# Patient Record
Sex: FEMALE | Race: WHITE | NOT HISPANIC OR LATINO | Employment: FULL TIME | URBAN - METROPOLITAN AREA
[De-identification: names, ages, dates, MRNs, and addresses within clinical notes are randomized per-mention and may not be internally consistent; named-entity substitution may affect disease eponyms.]

---

## 2017-08-06 ENCOUNTER — OFFICE VISIT (OUTPATIENT)
Dept: URGENT CARE | Facility: CLINIC | Age: 67
End: 2017-08-06
Payer: COMMERCIAL

## 2017-08-06 VITALS
OXYGEN SATURATION: 98 % | HEART RATE: 72 BPM | TEMPERATURE: 98 F | HEIGHT: 66 IN | BODY MASS INDEX: 29.73 KG/M2 | WEIGHT: 185 LBS | DIASTOLIC BLOOD PRESSURE: 81 MMHG | SYSTOLIC BLOOD PRESSURE: 137 MMHG | RESPIRATION RATE: 18 BRPM

## 2017-08-06 DIAGNOSIS — R51.9 FACIAL PAIN, ACUTE: Primary | ICD-10-CM

## 2017-08-06 PROCEDURE — 99203 OFFICE O/P NEW LOW 30 MIN: CPT | Mod: S$GLB,,, | Performed by: EMERGENCY MEDICINE

## 2017-08-06 PROCEDURE — 3008F BODY MASS INDEX DOCD: CPT | Mod: S$GLB,,, | Performed by: EMERGENCY MEDICINE

## 2017-08-06 RX ORDER — CLINDAMYCIN HYDROCHLORIDE 300 MG/1
300 CAPSULE ORAL 4 TIMES DAILY
Qty: 40 CAPSULE | Refills: 0 | Status: SHIPPED | OUTPATIENT
Start: 2017-08-06 | End: 2017-08-16

## 2017-08-06 NOTE — PATIENT INSTRUCTIONS
"  Dental Pain    A crack or cavity in a tooth can cause tooth pain. This is because the crack or cavity exposes the sensitive inner area of the tooth. An infection in the gum or the root of the tooth can cause pain and swelling. The pain is often made worse when you drink hot or cold beverages. It can also be worse when you bite on hard foods. Pain may spread from the tooth to your ear or the area of the jaw on the same side.  Home care  Follow these tips when caring for yourself at home:  · Avoid hot and cold foods and drinks. Your tooth may be sensitive to changes in temperature.  · Use toothpaste made for sensitive teeth. Brush gently up and down instead of sideways. Brushing sideways can wear away root surfaces if they are exposed.  · If your tooth is chipped or cracked, or if there is a large open cavity, put oil of cloves directly on the tooth to relieve pain. You can buy oil of cloves at drugstores. Some pharmacies carry an over-the-counter "toothache kit." This contains a paste that you can put on the exposed tooth to make it less sensitive.  · Put a cold pack on your jaw over the sore area to help reduce pain.  · You may use over-the-counter medicine to ease pain, unless your doctor prescribed another medicine. If you have chronic liver or kidney disease, talk with your healthcare provider before using acetaminophen or ibuprofen. Also talk with your provider if youve had a stomach ulcer or GI bleeding.  · If you have signs of an infection, you will be given an antibiotic. Take it as directed.  Follow-up care  Follow up with your dentist, or as advised. Your pain may go away with the treatment given today. But only a dentist can fully look at and treat the cause of your pain. This will keep the pain from coming back.  Call 911  Call 911 if any of these occur:  · Unusual drowsiness  · Headache or stiff neck  · Weakness or fainting  · Difficulty swallowing or breathing  When to seek medical advice  Call your " health care provider right away if any of these occur:  · Your face becomes swollen or red  · Pain gets worse or spreads to your neck  · Fever of 100.4º F (38.0º C) or higher, or as directed by your healthcare provider  · Pus drains from the tooth  Date Last Reviewed: 10/1/2016  © 0324-8695 [employer]. [address] [place] [address]. All rights reserved. This information is not intended as a substitute for professional medical care. Always follow your healthcare professional's instructions.      If your condition worsens or fails to improve we recommend that you receive another evaluation at the ER immediately or contact your PCP to discuss your concerns or return here. You must understand that you've received an urgent care treatment only and that you may be released before all your medical problems are known or treated. You the patient will arrange for followup care as instructed.   Follow up with your primary physician as needed  Tylenol or ibuprofen for pain or fever as needed. If you have any allergies to these meds or  contraindication to tylenol or ibuprofen like kidney disease, stomach ulcers or blood thinner etc then don't take the tylenol or ibuprofen.  If you do take the  Narcotic pain meds be sure to take precautions as it can make you sleepy or drowsy  If you had xrays done we will call you with radiologist report  If you had labs done we will call you with the results  If you get sicker in the meantime or new symptoms either return here or see your primary care doctor  If you do take the  Narcotic pain meds be sure to take precautions as it can make you sleepy or drowsy  If you had xrays done we will call you with radiologist report  If you had labs done we will call you with the results  If you get sicker in the meantime or new symptoms either return here or see your primary care doctor

## 2017-08-06 NOTE — PROGRESS NOTES
"Subjective:       Patient ID: Marjorie Harkins is a 66 y.o. female.    Vitals:  height is 5' 6" (1.676 m) and weight is 83.9 kg (185 lb). Her temperature is 97.6 °F (36.4 °C). Her blood pressure is 137/81 and her pulse is 72. Her respiration is 18 and oxygen saturation is 98%.     Chief Complaint: Oral Pain    Pt states she has been having the oral pain for about two weeks. She wasn't sure initially if it was a tooth or not. However since yesterday it seems clear that it is a tooth and she has had multiple root canals and fillings and caps. No fever. She plans on making appt with dentist tomorrow. She is not a diabetic. She says she only wants antibiotics and advil is sufficient for her pain      Oral Pain    This is a recurrent problem. The current episode started 1 to 4 weeks ago. The problem occurs constantly. The problem has been unchanged. The pain is at a severity of 5/10. The pain is mild. Associated symptoms include facial pain. Pertinent negatives include no fever or oral bleeding. She has tried NSAIDs for the symptoms. The treatment provided no relief.     Review of Systems   Constitution: Negative for chills and fever.   HENT: Negative for headaches and sore throat.    Eyes: Negative for blurred vision.   Cardiovascular: Negative for chest pain.   Respiratory: Negative for shortness of breath.    Skin: Negative for rash.   Musculoskeletal: Negative for back pain and joint pain.   Gastrointestinal: Negative for abdominal pain, diarrhea, nausea and vomiting.   Psychiatric/Behavioral: The patient is not nervous/anxious.        Objective:      Physical Exam   Constitutional: She is oriented to person, place, and time. She appears well-developed and well-nourished. She is cooperative.  Non-toxic appearance. She does not appear ill. No distress (mild).   HENT:   Head: Normocephalic and atraumatic.   Right Ear: Hearing, tympanic membrane, external ear and ear canal normal.   Left Ear: Hearing, tympanic membrane, " external ear and ear canal normal.   Nose: Nose normal. No mucosal edema, rhinorrhea or nasal deformity. No epistaxis. Right sinus exhibits no maxillary sinus tenderness and no frontal sinus tenderness. Left sinus exhibits no maxillary sinus tenderness and no frontal sinus tenderness.   Mouth/Throat: Uvula is midline, oropharynx is clear and moist and mucous membranes are normal. No trismus in the jaw. Abnormal dentition. No uvula swelling or dental caries. No posterior oropharyngeal erythema. Tonsils are 0 on the right. Tonsils are 0 on the left.       Eyes: Conjunctivae, EOM and lids are normal. Pupils are equal, round, and reactive to light. Right eye exhibits no discharge. Left eye exhibits no discharge. No scleral icterus.   Sclera clear bilat   Neck: Trachea normal, normal range of motion, full passive range of motion without pain and phonation normal. Neck supple.   Cardiovascular: Normal rate, regular rhythm, normal heart sounds and normal pulses.    Pulmonary/Chest: Effort normal and breath sounds normal. No respiratory distress.   Abdominal: Soft. Normal appearance and bowel sounds are normal. She exhibits no distension, no pulsatile midline mass and no mass. There is no tenderness.   Musculoskeletal: Normal range of motion. She exhibits no edema or deformity.   Neurological: She is alert and oriented to person, place, and time. She exhibits normal muscle tone. Coordination normal.   Skin: Skin is warm, dry and intact. She is not diaphoretic. No pallor.   Psychiatric: She has a normal mood and affect. Her speech is normal and behavior is normal. Judgment and thought content normal. Cognition and memory are normal.   Nursing note and vitals reviewed.      Assessment:       1. Facial pain, acute        Plan:         Facial pain, acute    Other orders  -     clindamycin (CLEOCIN) 300 MG capsule; Take 1 capsule (300 mg total) by mouth 4 (four) times daily.  Dispense: 40 capsule; Refill: 0

## 2017-11-26 ENCOUNTER — OFFICE VISIT (OUTPATIENT)
Dept: URGENT CARE | Facility: CLINIC | Age: 67
End: 2017-11-26
Payer: COMMERCIAL

## 2017-11-26 VITALS
DIASTOLIC BLOOD PRESSURE: 92 MMHG | OXYGEN SATURATION: 97 % | WEIGHT: 185 LBS | SYSTOLIC BLOOD PRESSURE: 150 MMHG | HEIGHT: 66 IN | RESPIRATION RATE: 12 BRPM | HEART RATE: 99 BPM | TEMPERATURE: 99 F | BODY MASS INDEX: 29.73 KG/M2

## 2017-11-26 DIAGNOSIS — I10 HYPERTENSION, UNSPECIFIED TYPE: Primary | ICD-10-CM

## 2017-11-26 DIAGNOSIS — M25.561 KNEE PAIN, RIGHT ANTERIOR: ICD-10-CM

## 2017-11-26 PROCEDURE — 99214 OFFICE O/P EST MOD 30 MIN: CPT | Mod: S$GLB,,, | Performed by: EMERGENCY MEDICINE

## 2017-11-26 RX ORDER — LOSARTAN POTASSIUM 50 MG/1
50 TABLET ORAL
Status: ON HOLD | COMMUNITY
Start: 2017-03-21 | End: 2024-02-15

## 2017-11-26 NOTE — PROGRESS NOTES
"Subjective:       Patient ID: Marjorie Harkins is a 66 y.o. female.    Vitals:  height is 5' 6" (1.676 m) and weight is 83.9 kg (185 lb). Her temperature is 98.5 °F (36.9 °C). Her blood pressure is 150/92 (abnormal) and her pulse is 99. Her respiration is 12 and oxygen saturation is 97%.     Chief Complaint: Joint Swelling    Pt fell on her right knee about one month ago. It is still swollen and she decided to get it checked today.She needs an orthopedist here as well as PCP. Her doctors had been in [place]. She forgot to take her BP meds for the past few days.      Pain   This is a new problem. The current episode started 1 to 4 weeks ago. The problem occurs constantly. The problem has been gradually improving. Associated symptoms include joint swelling and numbness. Pertinent negatives include no abdominal pain, chest pain, chills, fever, headaches, nausea, rash, sore throat or vomiting. The symptoms are aggravated by bending. She has tried nothing for the symptoms. The treatment provided no relief.   Knee Injury   This is a new problem. The current episode started 1 to 4 weeks ago. The problem occurs constantly. The problem has been gradually improving. Associated symptoms include joint swelling and numbness. Pertinent negatives include no abdominal pain, chest pain, chills, fever, headaches, nausea, rash, sore throat or vomiting. Associated symptoms comments: Pt has a hx of DVT and some discoloration in the right ankle. The symptoms are aggravated by bending. She has tried nothing for the symptoms. The treatment provided mild relief.     Review of Systems   Constitution: Negative for chills and fever.   HENT: Negative for sore throat.    Eyes: Negative for blurred vision.   Cardiovascular: Negative for chest pain.   Respiratory: Negative for shortness of breath.    Skin: Negative for rash.   Musculoskeletal: Positive for joint pain and joint swelling. Negative for back pain.   Gastrointestinal: Negative for " abdominal pain, diarrhea, nausea and vomiting.   Neurological: Positive for numbness. Negative for headaches.   Psychiatric/Behavioral: The patient is not nervous/anxious.    All other systems reviewed and are negative.      Objective:      Physical Exam   Constitutional: She is oriented to person, place, and time. She appears well-developed and well-nourished. She is cooperative.  Non-toxic appearance. She does not appear ill. No distress.   HENT:   Head: Normocephalic and atraumatic.   Right Ear: Hearing, tympanic membrane, external ear and ear canal normal.   Left Ear: Hearing, tympanic membrane, external ear and ear canal normal.   Nose: Nose normal. No mucosal edema, rhinorrhea or nasal deformity. No epistaxis. Right sinus exhibits no maxillary sinus tenderness and no frontal sinus tenderness. Left sinus exhibits no maxillary sinus tenderness and no frontal sinus tenderness.   Mouth/Throat: Uvula is midline, oropharynx is clear and moist and mucous membranes are normal. No trismus in the jaw. Normal dentition. No uvula swelling. No posterior oropharyngeal erythema.   Eyes: Conjunctivae, EOM and lids are normal. Pupils are equal, round, and reactive to light. Right eye exhibits no discharge. Left eye exhibits no discharge. No scleral icterus.   Sclera clear bilat   Neck: Trachea normal, normal range of motion, full passive range of motion without pain and phonation normal. Neck supple.   Cardiovascular: Normal rate, regular rhythm, normal heart sounds, intact distal pulses and normal pulses.    Pulmonary/Chest: Effort normal and breath sounds normal. No respiratory distress.   Abdominal: Soft. Normal appearance and bowel sounds are normal. She exhibits no distension, no pulsatile midline mass and no mass. There is no tenderness.   Musculoskeletal: Normal range of motion. She exhibits tenderness (tender slight swelling to inferior aspect of patella right knee. No patella tenderness. Good extension No ligament  laxity. No effusion). She exhibits no edema or deformity.   Neurological: She is alert and oriented to person, place, and time. She exhibits normal muscle tone. Coordination normal.   Skin: Skin is warm, dry and intact. She is not diaphoretic. No pallor.   Psychiatric: She has a normal mood and affect. Her speech is normal and behavior is normal. Judgment and thought content normal. Cognition and memory are normal.   Nursing note and vitals reviewed.    xray neg for fracture and radiologist confirm  Assessment:       1. Hypertension, unspecified type    2. Knee pain, right anterior        Plan:         Hypertension, unspecified type  -     Ambulatory referral to Internal Medicine    Knee pain, right anterior  -     X-Ray Knee 3 View Right; Future; Expected date: 11/26/2017  -     Ambulatory Referral to Sports Medicine

## 2017-11-26 NOTE — PATIENT INSTRUCTIONS
Lower Extremity Contusion  You have a contusion (bruise) of a lower extremity (leg, knee, ankle, foot, or toe). Symptoms include pain, swelling, and skin discoloration. No bones are broken. This injury may take from a few days to a few weeks to heal.  During that time, the bruise may change from reddish in color, to purple-blue, to green-yellow, to yellow-brown.  Home care  · Unless another medicine was prescribed, you can take acetaminophen, ibuprofen, or naproxen to control pain. (If you have chronic liver or kidney disease or ever had a stomach ulcer or gastrointestinal bleeding, talk with your doctor before using these medicines.)  · Elevate the injured area to reduce pain and swelling. As much as possible, sit or lie down with the injured area raised about the level of your heart. This is especially important during the first 48 hours.  · Ice the injured area to help reduce pain and swelling. Wrap a cold source (ice pack or ice cubes in a plastic bag) in a thin towel. Apply to the bruised area for 20 minutes every 1 to 2 hours the first day. Continue this 3 to 4 times a day until the pain and swelling goes away.  · If crutches have been advised, do not bear full weight on the injured leg until you can do so without pain. You may return to sports when you are able to put full weight and impact on the injured leg without pain.  Follow up  Follow up with your healthcare provider or our staff as advised. Call if you are not improving within the next 1 to 2 weeks.  When to seek medical advice   Call your healthcare provider right away if any of these occur:  · Increased pain or swelling  · Foot or toes become cold, blue, numb or tingly  · Signs of infection: Warmth, drainage, or increased redness or pain around the injury  · Inability to move the injured area   · Frequent bruising for unknown reasons  Date Last Reviewed: 2/1/2017  © 2039-4972 [employer]. [address] [place] [address], [place] [address].  All rights reserved. This information is not intended as a substitute for professional medical care. Always follow your healthcare professional's instructions.

## 2019-02-19 ENCOUNTER — OFFICE VISIT (OUTPATIENT)
Dept: URGENT CARE | Facility: CLINIC | Age: 69
End: 2019-02-19
Payer: COMMERCIAL

## 2019-02-19 VITALS
TEMPERATURE: 101 F | SYSTOLIC BLOOD PRESSURE: 110 MMHG | HEIGHT: 66 IN | BODY MASS INDEX: 29.73 KG/M2 | HEART RATE: 69 BPM | OXYGEN SATURATION: 96 % | DIASTOLIC BLOOD PRESSURE: 78 MMHG | WEIGHT: 185 LBS | RESPIRATION RATE: 20 BRPM

## 2019-02-19 DIAGNOSIS — J10.1 INFLUENZA A: Primary | ICD-10-CM

## 2019-02-19 LAB
CTP QC/QA: YES
FLUAV AG NPH QL: POSITIVE
FLUBV AG NPH QL: NEGATIVE

## 2019-02-19 PROCEDURE — 87804 POCT INFLUENZA A/B: ICD-10-PCS | Mod: QW,S$GLB,, | Performed by: FAMILY MEDICINE

## 2019-02-19 PROCEDURE — 99214 OFFICE O/P EST MOD 30 MIN: CPT | Mod: S$GLB,,, | Performed by: FAMILY MEDICINE

## 2019-02-19 PROCEDURE — 87804 INFLUENZA ASSAY W/OPTIC: CPT | Mod: QW,S$GLB,, | Performed by: FAMILY MEDICINE

## 2019-02-19 PROCEDURE — 99214 PR OFFICE/OUTPT VISIT, EST, LEVL IV, 30-39 MIN: ICD-10-PCS | Mod: S$GLB,,, | Performed by: FAMILY MEDICINE

## 2019-02-19 RX ORDER — OSELTAMIVIR PHOSPHATE 75 MG/1
75 CAPSULE ORAL 2 TIMES DAILY
Qty: 10 CAPSULE | Refills: 0 | Status: SHIPPED | OUTPATIENT
Start: 2019-02-19 | End: 2020-12-07

## 2019-02-19 RX ORDER — PROMETHAZINE HYDROCHLORIDE AND DEXTROMETHORPHAN HYDROBROMIDE 6.25; 15 MG/5ML; MG/5ML
5 SYRUP ORAL EVERY 4 HOURS PRN
Qty: 118 ML | Refills: 0 | Status: SHIPPED | OUTPATIENT
Start: 2019-02-19 | End: 2020-12-07

## 2019-02-19 NOTE — PATIENT INSTRUCTIONS
PLEASE READ YOUR DISCHARGE INSTRUCTIONS ENTIRELY AS IT CONTAINS IMPORTANT INFORMATION.      You have been diagnosed with Influenza.     You are contagious for 24 hours after you start the Tamilfu or 24 hours after your last fever, whichever happens last.    Please drink plenty of fluids.    Please get plenty of rest.    Please return here or go to the Emergency Department for any concerns or worsening of condition.    Tamiflu prescription has been discussed and if prescribed, please take to completion unless you cannot tolerate the side effects.       Take tylenol (acetominophen) for fever, chills or body aches every 4 hours. do not exceed 4000 mg/ day.    Take Motrin (Ibuprofen) every 4 hours for fever, chills, pain or inflammation.    Use an antihistmine such as claritin or zyrtec to dry you out. Use mucinex (guaifenisin) to break up mucous    Use over the counter flonase: one spray each nostril twice daily OR two sprays each nostril once daily.   If you find this dries your nose out or your nose bleeds, try using over the counter nasal saline a few minutes prior to using the flonase to moisten the lining of your nose.     Do not drive while taking the cough syrup - best to take it at night before going to sleep. However, you can take it during the day (every 4-6 hours) if you do not have to drive or operate machinery. This medication will make you drowsy. Try taking half a dose first to see how it affects you.     Please return or see your primary care doctor if you develop new or worsening symptoms.     Please arrange follow up with your primary medical clinic as soon as possible. You must understand that you've received an Urgent Care treatment only and that you may be released before all of your medical problems are known or treated. You, the patient, will arrange for follow up as instructed. If your symptoms worsen or fail to improve you should go to the Emergency Room.  WE CANNOT RULE OUT ALL POSSIBLE CAUSES  OF YOUR SYMPTOMS IN THE URGENT CARE SETTING PLEASE GO TO THE ER IF YOU FEELS YOUR CONDITION IS WORSENING OR YOU WOULD LIKE EMERGENT EVALUATION.      The Flu (Influenza)     The virus that causes the flu spreads through the air in droplets when someone who has the flu coughs, sneezes, laughs, or talks.   The flu (influenza) is an infection that affects your respiratory tract. This tract is made up of your mouth, nose, and lungs, and the passages between them. Unlike a cold, the flu can make you very ill. And it can lead to pneumonia, a serious lung infection. The flu can have serious complications and even cause death.  Who is at risk for the flu?  Anyone can get the flu. But you are more likely to become infected if you:  · Have a weakened immune system  · Work in a healthcare setting where you may be exposed to flu germs  · Live or work with someone who has the flu  · Havent had an annual flu shot  How does the flu spread?  The flu is caused by a virus. The virus spreads through the air in droplets when someone who has the flu coughs, sneezes, laughs, or talks. You can become infected when you inhale these viruses directly. You can also become infected when you touch a surface on which the droplets have landed and then transfer the germs to your eyes, nose, or mouth. Touching used tissues, or sharing utensils, drinking glasses, or a toothbrush from an infected person can expose you to flu viruses, too.  What are the symptoms of the flu?  Flu symptoms tend to come on quickly and may last a few days to a few weeks. They include:  · Fever usually higher than 100.4°F  (38°C) and chills  · Sore throat and headache  · Dry cough  · Runny nose  · Tiredness and weakness  · Muscle aches  Who is at risk for flu complications?  For some people, the flu can be very serious. The risk for complications is greater for:  · Children younger than age 5  · Adults ages 65 and older  · People with a chronic illness such as diabetes or  heart, kidney, or lung disease  · People who live in a nursing home or long-term care facility   How is the flu treated?  The flu usually gets better after 7 days or so. In some cases, your healthcare provider may prescribe an antiviral medicine. This may help you get well a little sooner. For the medicine to help, you need to take it as soon as possible (ideally within 48 hours) after your symptoms start. If you develop pneumonia or other serious illness, you may need to stay in the hospital.  Easing flu symptoms  · Drink lots of fluids such as water, juice, and warm soup. A good rule is to drink enough so that you urinate your normal amount.  · Get plenty of rest.  · Ask your healthcare provider what to take for fever and pain.  · Call your provider if your fever is 100.4°F (38°C) or higher, or you become dizzy, lightheaded, or short of breath.  Taking steps to protect others  · Wash your hands often, especially after coughing or sneezing. Or clean your hands with an alcohol-based hand  containing at least 60% alcohol.  · Cough or sneeze into a tissue. Then throw the tissue away and wash your hands. If you dont have a tissue, cough and sneeze into your elbow.  · Stay home until at least 24 hours after you no longer have a fever or chills. Be sure the fever isnt being hidden by fever-reducing medicine.  · Dont share food, utensils, drinking glasses, or a toothbrush with others.  · Ask your healthcare provider if others in your household should get antiviral medicine to help them avoid infection.  How can the flu be prevented?  · One of the best ways to avoid the flu is to get a flu vaccine each year. The virus that causes the flu changes from year to year. For that reason, healthcare providers recommend getting the flu vaccine each year, as soon as it's available in your area. The vaccine is given as a shot. Your healthcare provider can tell you which vaccine is right for you. A nasal spray is also  available but is not recommended for the 0037-7242 flu season. The CDC says this is because the nasal spray did not seem to protect against the flu over the last several flu seasons. In the past, it was meant for people ages 2 to 49.  · Wash your hands often. Frequent handwashing is a proven way to help prevent infection.  · Carry an alcohol-based hand gel containing at least 60% alcohol. Use it when you can't use soap and water. Then wash your hands as soon as you can.  · Avoid touching your eyes, nose, and mouth.  · At home and work, clean phones, computer keyboards, and toys often with disinfectant wipes.  · If possible, avoid close contact with others who have the flu or symptoms of the flu.  Handwashing tips  Handwashing is one of the best ways to prevent many common infections. If you are caring for or visiting someone with the flu, wash your hands each time you enter and leave the room. Follow these steps:  · Use warm water and plenty of soap. Rub your hands together well.  · Clean the whole hand, including under your nails, between your fingers, and up the wrists.  · Wash for at least 15 seconds.  · Rinse, letting the water run down your fingers, not up your wrists.  · Dry your hands well. Use a paper towel to turn off the faucet and open the door.  Using alcohol-based hand   Alcohol-based hand  are also a good choice. Use them when you can't use soap and water. Follow these steps:  · Squeeze about a tablespoon of gel into the palm of one hand.  · Rub your hands together briskly, cleaning the backs of your hands, the palms, between your fingers, and up the wrists.  · Rub until the gel is gone and your hands are completely dry.  Preventing the flu in healthcare settings  The flu is a special concern for people in hospitals and long-term care facilities. To help prevent the spread of flu, many hospitals and nursing homes take these steps:  · Healthcare providers wash their hands or use an  alcohol-based hand  before and after treating each patient.  · People with the flu have private rooms and bathrooms or share a room with someone with the same infection.  · People who are at high risk for the flu but don't have it are encouraged to get the flu and pneumonia vaccines.  · All healthcare workers are encouraged or required to get flu shots.   Date Last Reviewed: 12/1/2016  © 0300-2002 [employer]. [address] [place] [address], [place] [address]. All rights reserved. This information is not intended as a substitute for professional medical care. Always follow your healthcare professional's instructions.

## 2019-02-19 NOTE — PROGRESS NOTES
"Subjective:       Patient ID: Marjorie Harkins is a 68 y.o. female.    Vitals:  height is 5' 6" (1.676 m) and weight is 83.9 kg (185 lb). Her tympanic temperature is 100.5 °F (38.1 °C) (abnormal). Her blood pressure is 110/78 and her pulse is 69. Her respiration is 20 and oxygen saturation is 96%.     Chief Complaint: Cough    Patient states she been having a cough and body aches since yesterday around 3:00pm. 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      Cough   This is a new problem. The current episode started yesterday. The problem has been gradually worsening. The problem occurs constantly. The cough is productive of sputum. Associated symptoms include headaches, myalgias and nasal congestion. Pertinent negatives include no chills, ear pain, eye redness, fever, hemoptysis, rash, sore throat, shortness of breath or wheezing. Treatments tried: allergy medication. The treatment provided no relief. There is no history of asthma, bronchiectasis, bronchitis, COPD, emphysema, environmental allergies or pneumonia.       Constitution: Positive for appetite change, fatigue and generalized weakness. Negative for chills, sweating and fever.   HENT: Positive for congestion. Negative for ear pain, sinus pain, sinus pressure, sore throat and voice change.    Neck: Negative for painful lymph nodes.   Eyes: Negative for eye redness.   Respiratory: Positive for cough and sputum production. Negative for chest tightness, bloody sputum, COPD, shortness of breath, stridor, wheezing and asthma.    Gastrointestinal: Negative for nausea and vomiting.   Musculoskeletal: Positive for muscle ache.   Skin: Negative for rash.   Allergic/Immunologic: Negative for environmental allergies, seasonal allergies and asthma.   Neurological: Positive for headaches.   Hematologic/Lymphatic: Negative for swollen lymph nodes.       Objective:      Physical Exam   Constitutional: She is oriented to person, place, and time. She appears well-developed and " well-nourished. She is cooperative.  Non-toxic appearance. She does not appear ill. No distress.   HENT:   Head: Normocephalic and atraumatic.   Right Ear: Hearing, tympanic membrane, external ear and ear canal normal.   Left Ear: Hearing, tympanic membrane, external ear and ear canal normal.   Nose: Rhinorrhea present. No mucosal edema or nasal deformity. No epistaxis. Right sinus exhibits no maxillary sinus tenderness and no frontal sinus tenderness. Left sinus exhibits no maxillary sinus tenderness and no frontal sinus tenderness.   Mouth/Throat: Uvula is midline, oropharynx is clear and moist and mucous membranes are normal. No trismus in the jaw. Normal dentition. No uvula swelling. No oropharyngeal exudate or posterior oropharyngeal erythema.   Eyes: Conjunctivae and lids are normal. No scleral icterus.   Sclera clear bilat   Neck: Trachea normal, full passive range of motion without pain and phonation normal. Neck supple.   Cardiovascular: Normal rate, regular rhythm, normal heart sounds, intact distal pulses and normal pulses.   Pulmonary/Chest: Effort normal and breath sounds normal. No accessory muscle usage. No tachypnea. No respiratory distress. She has no decreased breath sounds. She has no wheezes. She has no rhonchi. She has no rales.   Abdominal: Soft. Normal appearance and bowel sounds are normal. She exhibits no distension. There is no tenderness.   Musculoskeletal: Normal range of motion. She exhibits no edema or deformity.   Neurological: She is alert and oriented to person, place, and time. She exhibits normal muscle tone. Coordination normal.   Skin: Skin is warm, dry and intact. She is not diaphoretic. No pallor.   Psychiatric: She has a normal mood and affect. Her speech is normal and behavior is normal. Judgment and thought content normal. Cognition and memory are normal.   Nursing note and vitals reviewed.      Results for orders placed or performed in visit on 02/19/19   POCT Influenza A/B    Result Value Ref Range    Rapid Influenza A Ag Positive (A) Negative    Rapid Influenza B Ag Negative Negative     Acceptable Yes        Assessment:       1. Influenza A        Plan:         Influenza A  -     POCT Influenza A/B  -     oseltamivir (TAMIFLU) 75 MG capsule; Take 1 capsule (75 mg total) by mouth 2 (two) times daily.  Dispense: 10 capsule; Refill: 0  -     promethazine-dextromethorphan (PROMETHAZINE-DM) 6.25-15 mg/5 mL Syrp; Take 5 mLs by mouth every 4 (four) hours as needed.  Dispense: 118 mL; Refill: 0          Patient Instructions     PLEASE READ YOUR DISCHARGE INSTRUCTIONS ENTIRELY AS IT CONTAINS IMPORTANT INFORMATION.      You have been diagnosed with Influenza.     You are contagious for 24 hours after you start the Tamilfu or 24 hours after your last fever, whichever happens last.    Please drink plenty of fluids.    Please get plenty of rest.    Please return here or go to the Emergency Department for any concerns or worsening of condition.    Tamiflu prescription has been discussed and if prescribed, please take to completion unless you cannot tolerate the side effects.       Take tylenol (acetominophen) for fever, chills or body aches every 4 hours. do not exceed 4000 mg/ day.    Take Motrin (Ibuprofen) every 4 hours for fever, chills, pain or inflammation.    Use an antihistmine such as claritin or zyrtec to dry you out. Use mucinex (guaifenisin) to break up mucous    Use over the counter flonase: one spray each nostril twice daily OR two sprays each nostril once daily.   If you find this dries your nose out or your nose bleeds, try using over the counter nasal saline a few minutes prior to using the flonase to moisten the lining of your nose.     Do not drive while taking the cough syrup - best to take it at night before going to sleep. However, you can take it during the day (every 4-6 hours) if you do not have to drive or operate machinery. This medication will make you  drowsy. Try taking half a dose first to see how it affects you.     Please return or see your primary care doctor if you develop new or worsening symptoms.     Please arrange follow up with your primary medical clinic as soon as possible. You must understand that you've received an Urgent Care treatment only and that you may be released before all of your medical problems are known or treated. You, the patient, will arrange for follow up as instructed. If your symptoms worsen or fail to improve you should go to the Emergency Room.  WE CANNOT RULE OUT ALL POSSIBLE CAUSES OF YOUR SYMPTOMS IN THE URGENT CARE SETTING PLEASE GO TO THE ER IF YOU FEELS YOUR CONDITION IS WORSENING OR YOU WOULD LIKE EMERGENT EVALUATION.      The Flu (Influenza)     The virus that causes the flu spreads through the air in droplets when someone who has the flu coughs, sneezes, laughs, or talks.   The flu (influenza) is an infection that affects your respiratory tract. This tract is made up of your mouth, nose, and lungs, and the passages between them. Unlike a cold, the flu can make you very ill. And it can lead to pneumonia, a serious lung infection. The flu can have serious complications and even cause death.  Who is at risk for the flu?  Anyone can get the flu. But you are more likely to become infected if you:  · Have a weakened immune system  · Work in a healthcare setting where you may be exposed to flu germs  · Live or work with someone who has the flu  · Havent had an annual flu shot  How does the flu spread?  The flu is caused by a virus. The virus spreads through the air in droplets when someone who has the flu coughs, sneezes, laughs, or talks. You can become infected when you inhale these viruses directly. You can also become infected when you touch a surface on which the droplets have landed and then transfer the germs to your eyes, nose, or mouth. Touching used tissues, or sharing utensils, drinking glasses, or a toothbrush from  an infected person can expose you to flu viruses, too.  What are the symptoms of the flu?  Flu symptoms tend to come on quickly and may last a few days to a few weeks. They include:  · Fever usually higher than 100.4°F  (38°C) and chills  · Sore throat and headache  · Dry cough  · Runny nose  · Tiredness and weakness  · Muscle aches  Who is at risk for flu complications?  For some people, the flu can be very serious. The risk for complications is greater for:  · Children younger than age 5  · Adults ages 65 and older  · People with a chronic illness such as diabetes or heart, kidney, or lung disease  · People who live in a nursing home or long-term care facility   How is the flu treated?  The flu usually gets better after 7 days or so. In some cases, your healthcare provider may prescribe an antiviral medicine. This may help you get well a little sooner. For the medicine to help, you need to take it as soon as possible (ideally within 48 hours) after your symptoms start. If you develop pneumonia or other serious illness, you may need to stay in the hospital.  Easing flu symptoms  · Drink lots of fluids such as water, juice, and warm soup. A good rule is to drink enough so that you urinate your normal amount.  · Get plenty of rest.  · Ask your healthcare provider what to take for fever and pain.  · Call your provider if your fever is 100.4°F (38°C) or higher, or you become dizzy, lightheaded, or short of breath.  Taking steps to protect others  · Wash your hands often, especially after coughing or sneezing. Or clean your hands with an alcohol-based hand  containing at least 60% alcohol.  · Cough or sneeze into a tissue. Then throw the tissue away and wash your hands. If you dont have a tissue, cough and sneeze into your elbow.  · Stay home until at least 24 hours after you no longer have a fever or chills. Be sure the fever isnt being hidden by fever-reducing medicine.  · Dont share food, utensils, drinking  glasses, or a toothbrush with others.  · Ask your healthcare provider if others in your household should get antiviral medicine to help them avoid infection.  How can the flu be prevented?  · One of the best ways to avoid the flu is to get a flu vaccine each year. The virus that causes the flu changes from year to year. For that reason, healthcare providers recommend getting the flu vaccine each year, as soon as it's available in your area. The vaccine is given as a shot. Your healthcare provider can tell you which vaccine is right for you. A nasal spray is also available but is not recommended for the 5191-7154 flu season. The CDC says this is because the nasal spray did not seem to protect against the flu over the last several flu seasons. In the past, it was meant for people ages 2 to 49.  · Wash your hands often. Frequent handwashing is a proven way to help prevent infection.  · Carry an alcohol-based hand gel containing at least 60% alcohol. Use it when you can't use soap and water. Then wash your hands as soon as you can.  · Avoid touching your eyes, nose, and mouth.  · At home and work, clean phones, computer keyboards, and toys often with disinfectant wipes.  · If possible, avoid close contact with others who have the flu or symptoms of the flu.  Handwashing tips  Handwashing is one of the best ways to prevent many common infections. If you are caring for or visiting someone with the flu, wash your hands each time you enter and leave the room. Follow these steps:  · Use warm water and plenty of soap. Rub your hands together well.  · Clean the whole hand, including under your nails, between your fingers, and up the wrists.  · Wash for at least 15 seconds.  · Rinse, letting the water run down your fingers, not up your wrists.  · Dry your hands well. Use a paper towel to turn off the faucet and open the door.  Using alcohol-based hand   Alcohol-based hand  are also a good choice. Use them when  you can't use soap and water. Follow these steps:  · Squeeze about a tablespoon of gel into the palm of one hand.  · Rub your hands together briskly, cleaning the backs of your hands, the palms, between your fingers, and up the wrists.  · Rub until the gel is gone and your hands are completely dry.  Preventing the flu in healthcare settings  The flu is a special concern for people in hospitals and long-term care facilities. To help prevent the spread of flu, many hospitals and nursing homes take these steps:  · Healthcare providers wash their hands or use an alcohol-based hand  before and after treating each patient.  · People with the flu have private rooms and bathrooms or share a room with someone with the same infection.  · People who are at high risk for the flu but don't have it are encouraged to get the flu and pneumonia vaccines.  · All healthcare workers are encouraged or required to get flu shots.   Date Last Reviewed: 12/1/2016  © 6155-7892 [employer]. [address] [place] [address], [place] [address]. All rights reserved. This information is not intended as a substitute for professional medical care. Always follow your healthcare professional's instructions.

## 2019-11-19 ENCOUNTER — HOSPITAL ENCOUNTER (OUTPATIENT)
Dept: RADIOLOGY | Facility: OTHER | Age: 69
Discharge: HOME | End: 2019-11-19
Attending: OBSTETRICS & GYNECOLOGY
Payer: COMMERCIAL

## 2019-11-19 DIAGNOSIS — Z13.820 SCREENING FOR OSTEOPOROSIS: ICD-10-CM

## 2019-11-19 PROCEDURE — 77080 DEXA BONE DENSITY SPINE HIP: ICD-10-PCS | Mod: 26,,, | Performed by: RADIOLOGY

## 2019-11-19 PROCEDURE — 77080 DXA BONE DENSITY AXIAL: CPT | Mod: TC

## 2019-11-19 PROCEDURE — 77080 DXA BONE DENSITY AXIAL: CPT | Mod: 26,,, | Performed by: RADIOLOGY

## 2019-12-15 ENCOUNTER — CLINICAL SUPPORT (OUTPATIENT)
Dept: URGENT CARE | Facility: CLINIC | Age: 69
End: 2019-12-15
Payer: COMMERCIAL

## 2019-12-15 DIAGNOSIS — Z23 ENCOUNTER FOR IMMUNIZATION: Primary | ICD-10-CM

## 2019-12-15 PROCEDURE — 90662 IIV NO PRSV INCREASED AG IM: CPT | Mod: S$GLB,,, | Performed by: EMERGENCY MEDICINE

## 2019-12-15 PROCEDURE — 90471 IMMUNIZATION ADMIN: CPT | Mod: S$GLB,,, | Performed by: EMERGENCY MEDICINE

## 2019-12-15 PROCEDURE — 90662 FLU VACCINE - HIGH DOSE (65+) PRESERVATIVE FREE IM: ICD-10-PCS | Mod: S$GLB,,, | Performed by: EMERGENCY MEDICINE

## 2019-12-15 PROCEDURE — 90471 FLU VACCINE - HIGH DOSE (65+) PRESERVATIVE FREE IM: ICD-10-PCS | Mod: S$GLB,,, | Performed by: EMERGENCY MEDICINE

## 2019-12-15 NOTE — PROGRESS NOTES
Subjective:       Patient ID: Marjorie Harkins is a 69 y.o. female.    Chief Complaint: Flu shot    HPI  ROS     Objective:      Physical Exam    Assessment:       No diagnosis found.    Plan:                   No follow-ups on file.

## 2020-01-22 ENCOUNTER — OFFICE VISIT (OUTPATIENT)
Dept: URGENT CARE | Facility: CLINIC | Age: 70
End: 2020-01-22
Payer: COMMERCIAL

## 2020-01-22 VITALS
WEIGHT: 185 LBS | HEIGHT: 66 IN | OXYGEN SATURATION: 95 % | BODY MASS INDEX: 29.73 KG/M2 | SYSTOLIC BLOOD PRESSURE: 146 MMHG | HEART RATE: 88 BPM | TEMPERATURE: 98 F | DIASTOLIC BLOOD PRESSURE: 96 MMHG | RESPIRATION RATE: 18 BRPM

## 2020-01-22 DIAGNOSIS — N39.0 URINARY TRACT INFECTION WITHOUT HEMATURIA, SITE UNSPECIFIED: Primary | ICD-10-CM

## 2020-01-22 DIAGNOSIS — R82.90 ABNORMAL URINE ODOR: ICD-10-CM

## 2020-01-22 LAB
BILIRUB UR QL STRIP: NEGATIVE
GLUCOSE UR QL STRIP: NEGATIVE
KETONES UR QL STRIP: NEGATIVE
LEUKOCYTE ESTERASE UR QL STRIP: POSITIVE
PH, POC UA: 5 (ref 5–8)
POC BLOOD, URINE: NEGATIVE
POC NITRATES, URINE: POSITIVE
PROT UR QL STRIP: NEGATIVE
SP GR UR STRIP: 1.01 (ref 1–1.03)
UROBILINOGEN UR STRIP-ACNC: NORMAL (ref 0.1–1.1)

## 2020-01-22 PROCEDURE — 87186 SC STD MICRODIL/AGAR DIL: CPT

## 2020-01-22 PROCEDURE — 81003 POCT URINALYSIS, DIPSTICK, AUTOMATED, W/O SCOPE: ICD-10-PCS | Mod: QW,S$GLB,, | Performed by: FAMILY MEDICINE

## 2020-01-22 PROCEDURE — 99000 SPECIMEN HANDLING OFFICE-LAB: CPT | Mod: S$GLB,,, | Performed by: FAMILY MEDICINE

## 2020-01-22 PROCEDURE — 99214 PR OFFICE/OUTPT VISIT, EST, LEVL IV, 30-39 MIN: ICD-10-PCS | Mod: 25,S$GLB,, | Performed by: FAMILY MEDICINE

## 2020-01-22 PROCEDURE — 87077 CULTURE AEROBIC IDENTIFY: CPT

## 2020-01-22 PROCEDURE — 81003 URINALYSIS AUTO W/O SCOPE: CPT | Mod: QW,S$GLB,, | Performed by: FAMILY MEDICINE

## 2020-01-22 PROCEDURE — 99000 PR SPECIMEN HANDLING,DR OFF->LAB: ICD-10-PCS | Mod: S$GLB,,, | Performed by: FAMILY MEDICINE

## 2020-01-22 PROCEDURE — 87086 URINE CULTURE/COLONY COUNT: CPT

## 2020-01-22 PROCEDURE — 87088 URINE BACTERIA CULTURE: CPT

## 2020-01-22 PROCEDURE — 99214 OFFICE O/P EST MOD 30 MIN: CPT | Mod: 25,S$GLB,, | Performed by: FAMILY MEDICINE

## 2020-01-22 RX ORDER — NITROFURANTOIN 25; 75 MG/1; MG/1
100 CAPSULE ORAL 2 TIMES DAILY
Qty: 14 CAPSULE | Refills: 0 | Status: SHIPPED | OUTPATIENT
Start: 2020-01-22 | End: 2020-01-28

## 2020-01-22 NOTE — PATIENT INSTRUCTIONS

## 2020-01-22 NOTE — PROGRESS NOTES
"Subjective:       Patient ID: Marjorie Harkins is a 69 y.o. female.    Vitals:  height is 5' 6" (1.676 m) and weight is 83.9 kg (185 lb). Her temperature is 97.8 °F (36.6 °C). Her blood pressure is 146/96 (abnormal) and her pulse is 88. Her respiration is 18 and oxygen saturation is 95%.     Chief Complaint:  Abdominal and pelvic discomfort.  Possible UTI  Pt c/o abdominal and pelvic discomfort that began 2 days. Pt states she thinks she may have a uti. Pt says her urine has odor and is cloudy and dark.  No dysuria, but some urgency.  No fever or back pain. No vomiting or diarrhea.  Eating and drinking okay.  Status post appendectomy in the past.  Not sexually active.    Abdominal Pain   This is a new problem. The current episode started in the past 7 days. The onset quality is sudden. The problem occurs constantly. The problem has been unchanged. The pain is located in the suprapubic region. The quality of the pain is dull. Associated symptoms include nausea. Pertinent negatives include no constipation, diarrhea, dysuria, fever or vomiting. She has tried nothing for the symptoms. There is no history of abdominal surgery.       Constitution: Negative for appetite change, chills, sweating and fever.   HENT: Negative for trouble swallowing.    Cardiovascular: Negative for chest pain.   Respiratory: Negative for shortness of breath.    Gastrointestinal: Positive for abdominal pain and nausea. Negative for abdominal trauma, abdominal bloating, history of abdominal surgery, vomiting, constipation, diarrhea, dark colored stools and heartburn.   Genitourinary: Negative for dysuria, missed menses and pelvic pain.   Musculoskeletal: Negative for back pain.       Objective:      Physical Exam   Constitutional: She is oriented to person, place, and time. She appears well-developed and well-nourished.   HENT:   Head: Normocephalic and atraumatic.   Nose: Nose normal.   Mouth/Throat: Mucous membranes are normal.   Eyes: " Conjunctivae and lids are normal.   Neck: Trachea normal, normal range of motion and full passive range of motion without pain. Neck supple.   Cardiovascular: Normal rate, regular rhythm and normal heart sounds.   Pulmonary/Chest: Effort normal and breath sounds normal. No stridor. No respiratory distress. She has no wheezes. She has no rales.   Abdominal: Soft. Normal appearance and bowel sounds are normal. She exhibits no distension, no abdominal bruit, no pulsatile midline mass and no mass. There is no guarding.   Minimal lower quadrant tenderness with definite suprapubic tenderness elicited.  No CVA pain.   Musculoskeletal: Normal range of motion. She exhibits no edema.   Lymphadenopathy:     She has no cervical adenopathy.   Neurological: She is alert and oriented to person, place, and time. She has normal strength.   Skin: Skin is warm, dry, intact, not diaphoretic and not pale.   Psychiatric: She has a normal mood and affect. Her speech is normal and behavior is normal. Judgment and thought content normal. Cognition and memory are normal.   Nursing note and vitals reviewed.        Results for orders placed or performed in visit on 01/22/20   POCT Urinalysis, Dipstick, Automated, W/O Scope   Result Value Ref Range    POC Blood, Urine Negative Negative    POC Bilirubin, Urine Negative Negative    POC Urobilinogen, Urine normal 0.1 - 1.1    POC Ketones, Urine Negative Negative    POC Protein, Urine Negative Negative    POC Nitrates, Urine Positive (A) Negative    POC Glucose, Urine Negative Negative    pH, UA 5.0 5 - 8    POC Specific Gravity, Urine 1.015 1.003 - 1.029    POC Leukocytes, Urine Positive (A) Negative     Assessment:       1. Urinary tract infection without hematuria, site unspecified    2. Abnormal urine odor        Plan:         Urinary tract infection without hematuria, site unspecified  -     Culture, Urine  -     nitrofurantoin, macrocrystal-monohydrate, (MACROBID) 100 MG capsule; Take 1  capsule (100 mg total) by mouth 2 (two) times daily. for 7 days  Dispense: 14 capsule; Refill: 0    Abnormal urine odor  -     POCT Urinalysis, Dipstick, Automated, W/O Scope    WE WILL CALL YOU IN SEVERAL DAYS WITH URINE CULTURE RESULT.    Make sure that you follow up with your primary care doctor in the next 2-5 days if needed .  Return to the Urgent Care if signs or symptoms change and certainly if you have worsening symptoms go to the nearest emergency department for further evaluation.

## 2020-01-26 ENCOUNTER — TELEPHONE (OUTPATIENT)
Dept: URGENT CARE | Facility: CLINIC | Age: 70
End: 2020-01-26

## 2020-01-26 LAB — BACTERIA UR CULT: ABNORMAL

## 2020-01-28 ENCOUNTER — TELEPHONE (OUTPATIENT)
Dept: URGENT CARE | Facility: CLINIC | Age: 70
End: 2020-01-28

## 2020-01-28 DIAGNOSIS — N39.0 URINARY TRACT INFECTION WITHOUT HEMATURIA, SITE UNSPECIFIED: Primary | ICD-10-CM

## 2020-01-28 RX ORDER — AMOXICILLIN AND CLAVULANATE POTASSIUM 875; 125 MG/1; MG/1
1 TABLET, FILM COATED ORAL 2 TIMES DAILY
Qty: 10 TABLET | Refills: 0 | Status: SHIPPED | OUTPATIENT
Start: 2020-01-28 | End: 2020-02-02

## 2020-01-28 NOTE — TELEPHONE ENCOUNTER
1/28 spoke w/patient about recent results, provider called in a new medication because patient still wasn't feeling well.

## 2020-01-28 NOTE — TELEPHONE ENCOUNTER
----- Message from Nguyen Cadet DO sent at 1/27/2020 10:14 PM CST -----  Please notify pt that the Urine culture results did show an infection and it was sensitive to the antibiotic she was given. She should finish the medication and Let us know if shes not feeling better.

## 2020-12-07 ENCOUNTER — OFFICE VISIT (OUTPATIENT)
Dept: URGENT CARE | Facility: CLINIC | Age: 70
End: 2020-12-07
Payer: COMMERCIAL

## 2020-12-07 VITALS
HEART RATE: 78 BPM | OXYGEN SATURATION: 99 % | BODY MASS INDEX: 27.64 KG/M2 | TEMPERATURE: 99 F | HEIGHT: 66 IN | DIASTOLIC BLOOD PRESSURE: 80 MMHG | WEIGHT: 172 LBS | SYSTOLIC BLOOD PRESSURE: 130 MMHG

## 2020-12-07 DIAGNOSIS — Z11.9 SCREENING EXAMINATION FOR UNSPECIFIED INFECTIOUS DISEASE: ICD-10-CM

## 2020-12-07 DIAGNOSIS — K52.9 GASTROENTERITIS: Primary | ICD-10-CM

## 2020-12-07 LAB
CTP QC/QA: YES
SARS-COV-2 RDRP RESP QL NAA+PROBE: NEGATIVE

## 2020-12-07 PROCEDURE — U0002: ICD-10-PCS | Mod: QW,S$GLB,, | Performed by: NURSE PRACTITIONER

## 2020-12-07 PROCEDURE — U0002 COVID-19 LAB TEST NON-CDC: HCPCS | Mod: QW,S$GLB,, | Performed by: NURSE PRACTITIONER

## 2020-12-07 PROCEDURE — 99213 PR OFFICE/OUTPT VISIT, EST, LEVL III, 20-29 MIN: ICD-10-PCS | Mod: S$GLB,,, | Performed by: NURSE PRACTITIONER

## 2020-12-07 PROCEDURE — 99213 OFFICE O/P EST LOW 20 MIN: CPT | Mod: S$GLB,,, | Performed by: NURSE PRACTITIONER

## 2020-12-07 RX ORDER — CLONAZEPAM 0.5 MG/1
0.5 TABLET ORAL NIGHTLY PRN
COMMUNITY
Start: 2020-10-19

## 2020-12-08 NOTE — PROGRESS NOTES
"Subjective:       Patient ID: Marjorie Harkins is a 69 y.o. female.    Vitals:  height is 5' 6" (1.676 m) and weight is 78 kg (172 lb). Her temperature is 98.9 °F (37.2 °C). Her blood pressure is 130/80 and her pulse is 78. Her oxygen saturation is 99%.     Chief Complaint: Diarrhea    Patient wants to return to work tomorrow, she has had diarrhea since Friday (3 days) that is almost gone.    Provider note begins below:    Only symptom is diarrhea. No fever or chills. No nausea or vomiting. No cough or SOB. No anosmia or ageusia.    Endorses sushi after onset of symptoms that made diarrhea worse. Patient denies any known exposure to sick individuals.    No urinary frequency or urgency.    Diarrhea   This is a new problem. Episode onset: Fri. The problem has been gradually improving. Pertinent negatives include no arthralgias, chills, coughing, fever, headaches, myalgias or vomiting. She has tried nothing for the symptoms.       Constitution: Negative for chills, fatigue and fever.   HENT: Negative for congestion and sore throat.    Neck: Negative for painful lymph nodes.   Cardiovascular: Negative for chest pain and leg swelling.   Eyes: Negative for double vision and blurred vision.   Respiratory: Negative for cough and shortness of breath.    Gastrointestinal: Positive for diarrhea. Negative for nausea and vomiting.   Genitourinary: Negative for dysuria, frequency, urgency and history of kidney stones.   Musculoskeletal: Negative for joint pain, joint swelling, muscle cramps and muscle ache.   Skin: Negative for color change, pale, rash and bruising.   Allergic/Immunologic: Negative for seasonal allergies.   Neurological: Negative for dizziness, history of vertigo, light-headedness, passing out and headaches.   Hematologic/Lymphatic: Negative for swollen lymph nodes.   Psychiatric/Behavioral: Negative for nervous/anxious, sleep disturbance and depression. The patient is not nervous/anxious.        Objective:    "   Physical Exam   Constitutional: She is oriented to person, place, and time. She appears well-developed. She is cooperative.  Non-toxic appearance. She does not appear ill. No distress.   HENT:   Head: Normocephalic and atraumatic.   Ears:   Right Ear: Hearing and external ear normal.   Left Ear: Hearing and external ear normal.   Nose: Nose normal.   Eyes: Conjunctivae and lids are normal.   Neck: Trachea normal and full passive range of motion without pain. Neck supple.   Cardiovascular: Normal rate.   Pulmonary/Chest: Effort normal and breath sounds normal. No respiratory distress.   Abdominal: Soft. Normal appearance and bowel sounds are normal. She exhibits no distension. There is no abdominal tenderness.   Musculoskeletal: Normal range of motion.   Neurological: She is alert and oriented to person, place, and time. She has normal strength.   Skin: Skin is warm, dry, intact, not diaphoretic and not pale. Psychiatric: Her speech is normal and behavior is normal. Judgment and thought content normal.   Nursing note and vitals reviewed.    Results for orders placed or performed in visit on 12/07/20   POCT COVID-19 Rapid Screening   Result Value Ref Range    POC Rapid COVID Negative Negative     Acceptable Yes            Assessment:       1. Gastroenteritis    2. Screening examination for unspecified infectious disease        Plan:       Labs ordered at this visit reviewed.     Gastroenteritis    Screening examination for unspecified infectious disease  -     POCT COVID-19 Rapid Screening      Patient Instructions     Uncertain Causes of Diarrhea (Adult)    Diarrhea is when stools are loose and watery. This can be caused by:  · Viral infections  · Bacterial infections  · Food poisoning  · Parasites  · Irritable bowel syndrome (IBS)  · Inflammatory bowel diseases such as ulcerative colitis, Crohn's disease, and celiac disease  · Food intolerance, such as to lactose, the sugar found in milk and milk  products  · Reaction to medicines like antibiotics, laxatives, cancer drugs, and antacids  Along with diarrhea, you may also have:  · Abdominal pain and cramping  · Nausea and vomiting  · Loss of bowel control  · Fever and chills  · Bloody stools  In some cases, antibiotics may help to treat diarrhea. You may have a stool sample test. This is done to see what is causing your diarrhea, and if antibiotics will help treat it. The results of a stool sample test may take up to 2 days. The healthcare provider may not give you antibiotics until he or she has the stool test results.  Diarrhea can cause dehydration. This is the loss of too much water and other fluids from the body. When this occurs, body fluid must be replaced. This can be done with oral rehydration solutions. Oral rehydration solutions are available at drugstores and grocery stores without a prescription.  Home care  Follow all instructions given by your healthcare provider. Rest at home for the next 24 hours, or until you feel better. Avoid caffeine, tobacco, and alcohol. These can make diarrhea, cramping, and pain worse.  If taking medicines:  · Dont take over-the-counter diarrhea or nausea medicines unless your healthcare provider tells you to.  · You may use acetaminophen or NSAID medicines like ibuprofen or naproxen to reduce pain and fever. Dont use these if you have chronic liver or kidney disease, or ever had a stomach ulcer or gastrointestinal bleeding. Don't use NSAID medicines if you are already taking one for another condition (like arthritis) or are on daily aspirin therapy (such as for heart disease or after a stroke). Talk with your healthcare provider first.  · If antibiotics were prescribed, be sure you take them until they are finished. Dont stop taking them even when you feel better. Antibiotics must be taken as a full course.  To prevent the spread of illness:  · Remember that washing with soap and water and using alcohol-based   is the best way to prevent the spread of infection.  · Clean the toilet after each use.  · Wash your hands before eating.  · Wash your hands before and after preparing food. Keep in mind that people with diarrhea or vomiting should not prepare food for others.  · Wash your hands after using cutting boards, countertops, and knives that have been in contact with raw foods.  · Wash and then peel fruits and vegetables.  · Keep uncooked meats away from cooked and ready-to-eat foods.  · Use a food thermometer when cooking. Cook poultry to at least 165°F (74°C). Cook ground meat (beef, veal, pork, lamb) to at least 160°F (71°C). Cook fresh beef, veal, lamb, and pork to at least 145°F (63°C).  · Dont eat raw or undercooked eggs (poached or ana paula side up), poultry, meat, or unpasteurized milk and juices.  Food and drinks  The main goal while treating vomiting or diarrhea is to prevent dehydration. This is done by taking small amounts of liquids often.  · Keep in mind that liquids are more important than food right now.  · Drink only small amounts of liquids at a time.  · Dont force yourself to eat, especially if you are having cramping, vomiting, or diarrhea. Dont eat large amounts at a time, even if you are hungry.  · If you eat, avoid fatty, greasy, spicy, or fried foods.  · Dont eat dairy foods or drink milk if you have diarrhea. These can make diarrhea worse.  During the first 24 hours you can try:  · Oral rehydration solutions. Do not use sports drinks. They have too much sugar and not enough electrolytes.  · Soft drinks without caffeine  · Ginger ale  · Water (plain or flavored)  · Decaf tea or coffee  · Clear broth, consommé, or bouillon  · Gelatin, popsicles, or frozen fruit juice bars  The second 24 hours, if you are feeling better, you can add:  · Hot cereal, plain toast, bread, rolls, or crackers  · Plain noodles, rice, mashed potatoes, chicken noodle soup, or rice soup  · Unsweetened canned fruit  (no pineapple)  · Bananas  As you recover:  · Limit fat intake to less than 15 grams per day. Dont eat margarine, butter, oils, mayonnaise, sauces, gravies, fried foods, peanut butter, meat, poultry, or fish.  · Limit fiber. Dont eat raw or cooked vegetables, fresh fruits except bananas, or bran cereals.  · Limit caffeine and chocolate.  · Limit dairy.  · Dont use spices or seasonings except salt.  · Go back to your normal diet over time, as you feel better and your symptoms improve.  · If the symptoms come back, go back to a simple diet or clear liquids.  Follow-up care  Follow up with your healthcare provider, or as advised. If a stool sample was taken or cultures were done, call the healthcare provider for the results as instructed.  Call 911  Call 911 if you have any of these symptoms:  · Trouble breathing  · Confusion  · Extreme drowsiness or trouble walking  · Loss of consciousness  · Rapid heart rate  · Chest pain  · Stiff neck  · Seizure  When to seek medical advice  Call your healthcare provider right away if any of these occur:  · Abdominal pain that gets worse  · Constant lower right abdominal pain  · Continued vomiting and inability to keep liquids down  · Diarrhea more than 5 times a day  · Blood in vomit or stool  · Dark urine or no urine for 8 hours, dry mouth and tongue, tiredness, weakness, or dizziness  · Drowsiness  · New rash  · You dont get better in 2 to 3 days  · Fever of 100.4°F (38°C) or higher that doesnt get lower with medicine  Date Last Reviewed: 1/3/2016  © 4491-3354 [employer]. [address] [place] [address], [place] [address]. All rights reserved. This information is not intended as a substitute for professional medical care. Always follow your healthcare professional's instructions.

## 2020-12-08 NOTE — PATIENT INSTRUCTIONS

## 2020-12-21 ENCOUNTER — HOSPITAL ENCOUNTER (OUTPATIENT)
Facility: OTHER | Age: 70
Discharge: HOME | End: 2020-12-24
Attending: EMERGENCY MEDICINE | Admitting: HOSPITALIST
Payer: COMMERCIAL

## 2020-12-21 DIAGNOSIS — I82.5Y1 CHRONIC DEEP VEIN THROMBOSIS (DVT) OF PROXIMAL VEIN OF RIGHT LOWER EXTREMITY: ICD-10-CM

## 2020-12-21 DIAGNOSIS — R07.9 ACUTE CHEST PAIN: ICD-10-CM

## 2020-12-21 DIAGNOSIS — I16.0 HYPERTENSIVE URGENCY: Primary | ICD-10-CM

## 2020-12-21 DIAGNOSIS — R10.13 EPIGASTRIC PAIN: ICD-10-CM

## 2020-12-21 DIAGNOSIS — K81.0 ACUTE CHOLECYSTITIS: ICD-10-CM

## 2020-12-21 DIAGNOSIS — R07.9 CHEST PAIN: ICD-10-CM

## 2020-12-21 DIAGNOSIS — R07.9 CHEST PAIN, UNSPECIFIED TYPE: ICD-10-CM

## 2020-12-21 DIAGNOSIS — D68.51 FACTOR V LEIDEN MUTATION: ICD-10-CM

## 2020-12-21 PROBLEM — I82.409 DEEP VENOUS THROMBOSIS OF LEG: Status: ACTIVE | Noted: 2020-12-21

## 2020-12-21 PROBLEM — H81.10 BENIGN POSITIONAL VERTIGO: Status: ACTIVE | Noted: 2020-12-21

## 2020-12-21 LAB
ALBUMIN SERPL BCP-MCNC: 4.2 G/DL (ref 3.5–5.2)
ALP SERPL-CCNC: 107 U/L (ref 55–135)
ALT SERPL W/O P-5'-P-CCNC: 21 U/L (ref 10–44)
ANION GAP SERPL CALC-SCNC: 12 MMOL/L (ref 8–16)
APTT BLDCRRT: 27.1 SEC (ref 21–32)
AST SERPL-CCNC: 20 U/L (ref 10–40)
BASOPHILS # BLD AUTO: 0.05 K/UL (ref 0–0.2)
BASOPHILS NFR BLD: 0.7 % (ref 0–1.9)
BILIRUB SERPL-MCNC: 0.4 MG/DL (ref 0.1–1)
BNP SERPL-MCNC: 59 PG/ML (ref 0–99)
BUN SERPL-MCNC: 9 MG/DL (ref 8–23)
CALCIUM SERPL-MCNC: 8.7 MG/DL (ref 8.7–10.5)
CHLORIDE SERPL-SCNC: 103 MMOL/L (ref 95–110)
CO2 SERPL-SCNC: 25 MMOL/L (ref 23–29)
CREAT SERPL-MCNC: 0.8 MG/DL (ref 0.5–1.4)
CTP QC/QA: YES
D DIMER PPP IA.FEU-MCNC: 0.67 MG/L FEU
DIFFERENTIAL METHOD: ABNORMAL
EOSINOPHIL # BLD AUTO: 0.1 K/UL (ref 0–0.5)
EOSINOPHIL NFR BLD: 1.1 % (ref 0–8)
ERYTHROCYTE [DISTWIDTH] IN BLOOD BY AUTOMATED COUNT: 12.9 % (ref 11.5–14.5)
EST. GFR  (AFRICAN AMERICAN): >60 ML/MIN/1.73 M^2
EST. GFR  (NON AFRICAN AMERICAN): >60 ML/MIN/1.73 M^2
GLUCOSE SERPL-MCNC: 132 MG/DL (ref 70–110)
HCT VFR BLD AUTO: 44.7 % (ref 37–48.5)
HGB BLD-MCNC: 14.2 G/DL (ref 12–16)
IMM GRANULOCYTES # BLD AUTO: 0.03 K/UL (ref 0–0.04)
IMM GRANULOCYTES NFR BLD AUTO: 0.4 % (ref 0–0.5)
INR PPP: 0.9 (ref 0.8–1.2)
LYMPHOCYTES # BLD AUTO: 1.3 K/UL (ref 1–4.8)
LYMPHOCYTES NFR BLD: 18.4 % (ref 18–48)
MAGNESIUM SERPL-MCNC: 2.1 MG/DL (ref 1.6–2.6)
MCH RBC QN AUTO: 29.1 PG (ref 27–31)
MCHC RBC AUTO-ENTMCNC: 31.8 G/DL (ref 32–36)
MCV RBC AUTO: 92 FL (ref 82–98)
MONOCYTES # BLD AUTO: 0.6 K/UL (ref 0.3–1)
MONOCYTES NFR BLD: 7.5 % (ref 4–15)
NEUTROPHILS # BLD AUTO: 5.3 K/UL (ref 1.8–7.7)
NEUTROPHILS NFR BLD: 71.9 % (ref 38–73)
NRBC BLD-RTO: 0 /100 WBC
PLATELET # BLD AUTO: 185 K/UL (ref 150–350)
PMV BLD AUTO: 10.7 FL (ref 9.2–12.9)
POTASSIUM SERPL-SCNC: 3.7 MMOL/L (ref 3.5–5.1)
PROT SERPL-MCNC: 6.9 G/DL (ref 6–8.4)
PROTHROMBIN TIME: 10.2 SEC (ref 9–12.5)
RBC # BLD AUTO: 4.88 M/UL (ref 4–5.4)
SARS-COV-2 RDRP RESP QL NAA+PROBE: NEGATIVE
SODIUM SERPL-SCNC: 140 MMOL/L (ref 136–145)
TROPONIN I SERPL DL<=0.01 NG/ML-MCNC: 0.01 NG/ML (ref 0–0.03)
TROPONIN I SERPL DL<=0.01 NG/ML-MCNC: 0.01 NG/ML (ref 0–0.03)
TROPONIN I SERPL DL<=0.01 NG/ML-MCNC: <0.006 NG/ML (ref 0–0.03)
TROPONIN I SERPL DL<=0.01 NG/ML-MCNC: <0.006 NG/ML (ref 0–0.03)
WBC # BLD AUTO: 7.3 K/UL (ref 3.9–12.7)

## 2020-12-21 PROCEDURE — 85025 COMPLETE CBC W/AUTO DIFF WBC: CPT

## 2020-12-21 PROCEDURE — 93010 ELECTROCARDIOGRAM REPORT: CPT | Mod: ,,, | Performed by: INTERNAL MEDICINE

## 2020-12-21 PROCEDURE — 96374 THER/PROPH/DIAG INJ IV PUSH: CPT

## 2020-12-21 PROCEDURE — 99220 PR INITIAL OBSERVATION CARE,LEVL III: ICD-10-PCS | Mod: ,,, | Performed by: NURSE PRACTITIONER

## 2020-12-21 PROCEDURE — G0378 HOSPITAL OBSERVATION PER HR: HCPCS

## 2020-12-21 PROCEDURE — A4216 STERILE WATER/SALINE, 10 ML: HCPCS | Performed by: NURSE PRACTITIONER

## 2020-12-21 PROCEDURE — 94761 N-INVAS EAR/PLS OXIMETRY MLT: CPT

## 2020-12-21 PROCEDURE — 85730 THROMBOPLASTIN TIME PARTIAL: CPT

## 2020-12-21 PROCEDURE — 80053 COMPREHEN METABOLIC PANEL: CPT

## 2020-12-21 PROCEDURE — 83735 ASSAY OF MAGNESIUM: CPT

## 2020-12-21 PROCEDURE — 25000003 PHARM REV CODE 250: Performed by: NURSE PRACTITIONER

## 2020-12-21 PROCEDURE — 25000003 PHARM REV CODE 250: Performed by: EMERGENCY MEDICINE

## 2020-12-21 PROCEDURE — 84484 ASSAY OF TROPONIN QUANT: CPT | Mod: 91

## 2020-12-21 PROCEDURE — 63600175 PHARM REV CODE 636 W HCPCS: Performed by: EMERGENCY MEDICINE

## 2020-12-21 PROCEDURE — 99285 EMERGENCY DEPT VISIT HI MDM: CPT | Mod: 25

## 2020-12-21 PROCEDURE — 93010 EKG 12-LEAD: ICD-10-PCS | Mod: 76,,, | Performed by: INTERNAL MEDICINE

## 2020-12-21 PROCEDURE — 83880 ASSAY OF NATRIURETIC PEPTIDE: CPT

## 2020-12-21 PROCEDURE — U0002 COVID-19 LAB TEST NON-CDC: HCPCS | Performed by: EMERGENCY MEDICINE

## 2020-12-21 PROCEDURE — 99220 PR INITIAL OBSERVATION CARE,LEVL III: CPT | Mod: ,,, | Performed by: NURSE PRACTITIONER

## 2020-12-21 PROCEDURE — 85610 PROTHROMBIN TIME: CPT

## 2020-12-21 PROCEDURE — 93005 ELECTROCARDIOGRAM TRACING: CPT

## 2020-12-21 PROCEDURE — 25500020 PHARM REV CODE 255: Performed by: EMERGENCY MEDICINE

## 2020-12-21 PROCEDURE — 85379 FIBRIN DEGRADATION QUANT: CPT

## 2020-12-21 PROCEDURE — 25000242 PHARM REV CODE 250 ALT 637 W/ HCPCS: Performed by: EMERGENCY MEDICINE

## 2020-12-21 PROCEDURE — 36415 COLL VENOUS BLD VENIPUNCTURE: CPT

## 2020-12-21 RX ORDER — NITROGLYCERIN 0.4 MG/1
0.4 TABLET SUBLINGUAL EVERY 5 MIN PRN
Status: COMPLETED | OUTPATIENT
Start: 2020-12-21 | End: 2020-12-21

## 2020-12-21 RX ORDER — TALC
6 POWDER (GRAM) TOPICAL NIGHTLY PRN
Status: DISCONTINUED | OUTPATIENT
Start: 2020-12-21 | End: 2020-12-24 | Stop reason: HOSPADM

## 2020-12-21 RX ORDER — MORPHINE SULFATE 4 MG/ML
4 INJECTION, SOLUTION INTRAMUSCULAR; INTRAVENOUS
Status: COMPLETED | OUTPATIENT
Start: 2020-12-21 | End: 2020-12-21

## 2020-12-21 RX ORDER — CLONAZEPAM 0.5 MG/1
0.5 TABLET ORAL NIGHTLY
Status: DISCONTINUED | OUTPATIENT
Start: 2020-12-21 | End: 2020-12-24 | Stop reason: HOSPADM

## 2020-12-21 RX ORDER — ACETAMINOPHEN 325 MG/1
650 TABLET ORAL EVERY 4 HOURS PRN
Status: DISCONTINUED | OUTPATIENT
Start: 2020-12-21 | End: 2020-12-24 | Stop reason: HOSPADM

## 2020-12-21 RX ORDER — SODIUM CHLORIDE 0.9 % (FLUSH) 0.9 %
3 SYRINGE (ML) INJECTION EVERY 8 HOURS
Status: DISCONTINUED | OUTPATIENT
Start: 2020-12-21 | End: 2020-12-24 | Stop reason: HOSPADM

## 2020-12-21 RX ORDER — PANTOPRAZOLE SODIUM 40 MG/1
40 TABLET, DELAYED RELEASE ORAL DAILY
Status: DISCONTINUED | OUTPATIENT
Start: 2020-12-22 | End: 2020-12-24 | Stop reason: HOSPADM

## 2020-12-21 RX ORDER — ONDANSETRON 8 MG/1
8 TABLET, ORALLY DISINTEGRATING ORAL EVERY 8 HOURS PRN
Status: DISCONTINUED | OUTPATIENT
Start: 2020-12-21 | End: 2020-12-24 | Stop reason: HOSPADM

## 2020-12-21 RX ORDER — LOSARTAN POTASSIUM 50 MG/1
50 TABLET ORAL DAILY
Status: DISCONTINUED | OUTPATIENT
Start: 2020-12-21 | End: 2020-12-24 | Stop reason: HOSPADM

## 2020-12-21 RX ORDER — HYDRALAZINE HYDROCHLORIDE 25 MG/1
25 TABLET, FILM COATED ORAL EVERY 8 HOURS PRN
Status: DISCONTINUED | OUTPATIENT
Start: 2020-12-21 | End: 2020-12-24 | Stop reason: HOSPADM

## 2020-12-21 RX ADMIN — LOSARTAN POTASSIUM 50 MG: 50 TABLET, FILM COATED ORAL at 11:12

## 2020-12-21 RX ADMIN — MORPHINE SULFATE 4 MG: 4 INJECTION, SOLUTION INTRAMUSCULAR; INTRAVENOUS at 08:12

## 2020-12-21 RX ADMIN — Medication 3 ML: at 09:12

## 2020-12-21 RX ADMIN — HYDRALAZINE HYDROCHLORIDE 25 MG: 25 TABLET, FILM COATED ORAL at 06:12

## 2020-12-21 RX ADMIN — NITROGLYCERIN 0.4 MG: 0.4 TABLET, ORALLY DISINTEGRATING SUBLINGUAL at 07:12

## 2020-12-21 RX ADMIN — CLONAZEPAM 0.5 MG: 0.5 TABLET ORAL at 09:12

## 2020-12-21 RX ADMIN — Medication 3 ML: at 01:12

## 2020-12-21 RX ADMIN — IOHEXOL 75 ML: 350 INJECTION, SOLUTION INTRAVENOUS at 09:12

## 2020-12-21 NOTE — H&P
"[facility] Medicine  History & Physical    Patient Name: Marjorie Harkins  MRN: [id number]  Patient Class: OP- Observation  Admission Date: 12/21/2020  Attending Physician: Héctor Duke MD   Primary Care Provider: JAQUI Chatman Jr. (Inactive)         Patient information was obtained from patient, past medical records and ER records.     Subjective:     Principal Problem:Hypertensive urgency    Chief Complaint:   Chief Complaint   Patient presents with    Chest Pain     chest pain since 0330am        HPI: Marjorie Harkins is a 70 year old woman with medical history of hypertension, factor V Leiden deficiency with history of deep vein thrombosis with IVC filter placement and benign postional vertigo.  She presented to the [facility] ED for evaluation of acute chest discomfort. The patient reported that she awoke at approximately 3:00 am with epigastric pain.  She describes the pain as a "heaviness" across her chest, but centered in epigastric region.  She reported radiation of pain to right flank and noted one episode of nausea and dry heaving.  She states she took an two aspirin without relief.  She denies shortness of breath or diaphoresis.   On arrival to ED, patient was found to be severely hypertensive, 221/97.  Electrocardiogram showed no evidence of acute ischemia and initial troponin negative.  Given history of factor V Leiden deficiency, CTA chest was completed and showed no evidence of acute pulmonary embolism.  The patient's hypertension was treated with her home dose of losartan 50 mg and one dose of intravenous morphine with improvement to patient's blood pressure and resolution of chest pain.  She will be admitted for observation and to rule out cardiac source of chest pain.      Past Medical History:   Diagnosis Date    Deep vein thrombosis (DVT) 2002    Hypertension        Past Surgical History:   Procedure Laterality Date    APPENDECTOMY      HYSTERECTOMY      " TONSILLECTOMY         Review of patient's allergies indicates:  No Known Allergies    No current facility-administered medications on file prior to encounter.      Current Outpatient Medications on File Prior to Encounter   Medication Sig    losartan (COZAAR) 50 MG tablet Take 50 mg by mouth.    clonazePAM (KLONOPIN) 0.5 MG tablet Take 0.5 mg by mouth nightly as needed.     Family History     None        Tobacco Use    Smoking status: Never Smoker    Smokeless tobacco: Never Used   Substance and Sexual Activity    Alcohol use: Yes     Alcohol/week: 4.0 standard drinks     Types: 4 Glasses of wine per week    Drug use: No    Sexual activity: Not on file     Review of Systems   Constitutional: Negative for chills and fever.   HENT: Negative for congestion and trouble swallowing.    Eyes: Negative for photophobia and visual disturbance.   Respiratory: Negative for chest tightness and shortness of breath.    Cardiovascular: Negative for chest pain, palpitations and leg swelling.   Gastrointestinal: Negative for abdominal pain, nausea and vomiting.   Genitourinary: Positive for flank pain (right). Negative for decreased urine volume and dysuria.   Musculoskeletal: Negative for arthralgias and myalgias.   Skin: Negative for pallor and rash.   Neurological: Negative for weakness, light-headedness and headaches.   Hematological: Does not bruise/bleed easily.   Psychiatric/Behavioral: Negative for confusion. The patient is not nervous/anxious.      Objective:     Vital Signs (Most Recent):  Temp: 97.7 °F (36.5 °C) (12/21/20 0707)  Pulse: 79 (12/21/20 1032)  Resp: 17 (12/21/20 1032)  BP: (Abnormal) 175/84 (12/21/20 1032)  SpO2: 100 % (12/21/20 1032) Vital Signs (24h Range):  Temp:  [97.7 °F (36.5 °C)] 97.7 °F (36.5 °C)  Pulse:  [57-83] 79  Resp:  [15-22] 17  SpO2:  [95 %-100 %] 100 %  BP: (143-221)/() 175/84     Weight: 79.4 kg (175 lb)  Body mass index is 28.25 kg/m².    Physical Exam  Vitals signs and nursing  note reviewed.   Constitutional:       General: She is not in acute distress.     Appearance: Normal appearance. She is well-developed and normal weight.   HENT:      Head: Normocephalic and atraumatic.      Nose: Nose normal. No congestion.      Mouth/Throat:      Mouth: Mucous membranes are moist.      Pharynx: Oropharynx is clear.   Eyes:      General: Lids are normal.      Conjunctiva/sclera: Conjunctivae normal.      Pupils: Pupils are equal, round, and reactive to light.   Neck:      Musculoskeletal: Normal range of motion. No muscular tenderness.      Vascular: No JVD.   Cardiovascular:      Rate and Rhythm: Normal rate and regular rhythm.      Pulses: Normal pulses.      Heart sounds: Normal heart sounds.   Pulmonary:      Effort: Pulmonary effort is normal.      Breath sounds: Normal breath sounds.   Abdominal:      General: Abdomen is flat. Bowel sounds are normal.      Palpations: Abdomen is soft.      Tenderness: There is abdominal tenderness in the right upper quadrant. There is no right CVA tenderness, left CVA tenderness or guarding.   Musculoskeletal: Normal range of motion.         General: No tenderness.   Skin:     General: Skin is warm and dry.   Neurological:      Mental Status: She is alert and oriented to person, place, and time. Mental status is at baseline.   Psychiatric:         Mood and Affect: Mood normal.         Behavior: Behavior normal.           CRANIAL NERVES     CN III, IV, VI   Pupils are equal, round, and reactive to light.       Significant Labs:   CBC:   Recent Labs   Lab 12/21/20  0730   WBC 7.30   HGB 14.2   HCT 44.7        CMP:   Recent Labs   Lab 12/21/20  0730      K 3.7      CO2 25   *   BUN 9   CREATININE 0.8   CALCIUM 8.7   PROT 6.9   ALBUMIN 4.2   BILITOT 0.4   ALKPHOS 107   AST 20   ALT 21   ANIONGAP 12   EGFRNONAA >60     Cardiac Markers:   Recent Labs   Lab 12/21/20  0730   BNP 59     Magnesium: No results for input(s): MG in the last 48  hours.  Troponin:   Recent Labs   Lab 12/21/20  0730   TROPONINI <0.006     All pertinent labs within the past 24 hours have been reviewed.    Significant Imaging: I have reviewed all pertinent imaging results/findings within the past 24 hours.     Imaging Results          CTA Chest Non-Coronary (PE Study) (Final result)  Result time 12/21/20 09:58:58    Final result by Monika Milton MD (12/21/20 09:58:58)             Impression:      No evidence for acute pulmonary thromboembolism through the segmental pulmonary arteries.    Bibasilar atelectasis.  Otherwise, clear lungs.      Electronically signed by: Monika Milton  Date:    12/21/2020  Time:    09:58           Narrative:    EXAMINATION:  CTA CHEST NON CORONARY    CLINICAL HISTORY:  PE suspected, intermediate prob, positive D-dimer;    TECHNIQUE:  Technique: Contiguous 1.25-mm axial images were obtained from the thoracic inlet to the level upper abdomen following the administration of 75 cc of intravenous Omnipaque 350 Sagittal and coronal reformats were submitted.    COMPARISON:  None    FINDINGS:  No intraluminal filling defect in the trunk, main, lobar or segmental pulmonary arteries to suggest acute pulmonary embolus.    Trachea and proximal airways are patent.  The lungs are expanded. No acute consolidation, pleural effusion, or pneumothorax seen.  Dependent atelectasis.    The aorta is normal caliber.  No mediastinal or hilar lymph node enlargement.  No pericardial effusion.                             X-Ray Chest AP Portable (Final result)  Result time 12/21/20 07:46:40    Final result by Jeff St DO (12/21/20 07:46:40)             Impression:      No acute cardiopulmonary abnormality.      Electronically signed by: Jeff St  Date:    12/21/2020  Time:    07:46           Narrative:    EXAMINATION:  XR CHEST AP PORTABLE    CLINICAL HISTORY:  Chest Pain.    TECHNIQUE:  Single frontal view of the chest was  performed.    COMPARISON:  None    FINDINGS:  The lungs are well expanded and clear. No focal opacities are seen. The pleural spaces are clear.  The cardiac silhouette is unremarkable.  The visualized osseous structures are unremarkable.                                Assessment/Plan:     * Hypertensive urgency  - Severe hypertension on admission  - Patient started on her home dose of losartan 50 mg daily  - PRN hydralazine for SBP > 170      Epigastric pain  Chest Pain, Rule Out Acute Myocardial Infarction  - EKG without  any acute ST/T wave changes  - Troponin trended negative  - Known cardiac risk factors: factor V Leiden mutation, family history, uncontrolled hypertension, most recent lipid panel 10/2020 with elevated total cholesterol and increased LDL   - Will recommend follow up with Cardiology or PCP for possible cardiac testing for risk stratification  - Patient reports chest pain resolved, but continues to report right flank pain  - Will consider gastrointestinal source of pain given RUQ tenderness and right flank pain  - Start protonix and US abdomen in AM to evaluated for cholecystitis         Deep venous thrombosis of leg  - Known Factor V Leiden mutation  - S/p IVC filter placement and not on anticoagulation      VTE Risk Mitigation (From admission, onward)         Ordered     Place sequential compression device  Until discontinued      12/21/20 1214     Place EROS hose  Until discontinued      12/21/20 1214     IP VTE HIGH RISK PATIENT  Once      12/21/20 1214     Place sequential compression device  Until discontinued      12/21/20 1214                   Sherine Alexis NP  Department of Hospital Medicine   [facility]

## 2020-12-21 NOTE — ASSESSMENT & PLAN NOTE
- Severe hypertension on admission  - Patient started on her home dose of losartan 50 mg daily  - PRN hydralazine for SBP > 170

## 2020-12-21 NOTE — ASSESSMENT & PLAN NOTE
Chest Pain, Rule Out Acute Myocardial Infarction  - EKG without  any acute ST/T wave changes  - Troponin trended negative  - Known cardiac risk factors: factor V Leiden mutation, family history, uncontrolled hypertension, most recent lipid panel 10/2020 with elevated total cholesterol and increased LDL   - Will recommend follow up with Cardiology or PCP for possible cardiac testing for risk stratification  - Patient reports chest pain resolved, but continues to report right flank pain  - Will consider gastrointestinal source of pain given RUQ tenderness and right flank pain  - Start protonix and US abdomen in AM to evaluated for cholecystitis

## 2020-12-21 NOTE — SUBJECTIVE & OBJECTIVE
Past Medical History:   Diagnosis Date    Deep vein thrombosis (DVT) 2002    Hypertension        Past Surgical History:   Procedure Laterality Date    APPENDECTOMY      HYSTERECTOMY      TONSILLECTOMY         Review of patient's allergies indicates:  No Known Allergies    No current facility-administered medications on file prior to encounter.      Current Outpatient Medications on File Prior to Encounter   Medication Sig    losartan (COZAAR) 50 MG tablet Take 50 mg by mouth.    clonazePAM (KLONOPIN) 0.5 MG tablet Take 0.5 mg by mouth nightly as needed.     Family History     None        Tobacco Use    Smoking status: Never Smoker    Smokeless tobacco: Never Used   Substance and Sexual Activity    Alcohol use: Yes     Alcohol/week: 4.0 standard drinks     Types: 4 Glasses of wine per week    Drug use: No    Sexual activity: Not on file     Review of Systems   Constitutional: Negative for chills and fever.   HENT: Negative for congestion and trouble swallowing.    Eyes: Negative for photophobia and visual disturbance.   Respiratory: Negative for chest tightness and shortness of breath.    Cardiovascular: Negative for chest pain, palpitations and leg swelling.   Gastrointestinal: Negative for abdominal pain, nausea and vomiting.   Genitourinary: Positive for flank pain (right). Negative for decreased urine volume and dysuria.   Musculoskeletal: Negative for arthralgias and myalgias.   Skin: Negative for pallor and rash.   Neurological: Negative for weakness, light-headedness and headaches.   Hematological: Does not bruise/bleed easily.   Psychiatric/Behavioral: Negative for confusion. The patient is not nervous/anxious.      Objective:     Vital Signs (Most Recent):  Temp: 97.7 °F (36.5 °C) (12/21/20 0707)  Pulse: 79 (12/21/20 1032)  Resp: 17 (12/21/20 1032)  BP: (Abnormal) 175/84 (12/21/20 1032)  SpO2: 100 % (12/21/20 1032) Vital Signs (24h Range):  Temp:  [97.7 °F (36.5 °C)] 97.7 °F (36.5 °C)  Pulse:   [57-83] 79  Resp:  [15-22] 17  SpO2:  [95 %-100 %] 100 %  BP: (143-221)/() 175/84     Weight: 79.4 kg (175 lb)  Body mass index is 28.25 kg/m².    Physical Exam  Vitals signs and nursing note reviewed.   Constitutional:       General: She is not in acute distress.     Appearance: Normal appearance. She is well-developed and normal weight.   HENT:      Head: Normocephalic and atraumatic.      Nose: Nose normal. No congestion.      Mouth/Throat:      Mouth: Mucous membranes are moist.      Pharynx: Oropharynx is clear.   Eyes:      General: Lids are normal.      Conjunctiva/sclera: Conjunctivae normal.      Pupils: Pupils are equal, round, and reactive to light.   Neck:      Musculoskeletal: Normal range of motion. No muscular tenderness.      Vascular: No JVD.   Cardiovascular:      Rate and Rhythm: Normal rate and regular rhythm.      Pulses: Normal pulses.      Heart sounds: Normal heart sounds.   Pulmonary:      Effort: Pulmonary effort is normal.      Breath sounds: Normal breath sounds.   Abdominal:      General: Abdomen is flat. Bowel sounds are normal.      Palpations: Abdomen is soft.      Tenderness: There is abdominal tenderness in the right upper quadrant. There is no right CVA tenderness, left CVA tenderness or guarding.   Musculoskeletal: Normal range of motion.         General: No tenderness.   Skin:     General: Skin is warm and dry.   Neurological:      Mental Status: She is alert and oriented to person, place, and time. Mental status is at baseline.   Psychiatric:         Mood and Affect: Mood normal.         Behavior: Behavior normal.           CRANIAL NERVES     CN III, IV, VI   Pupils are equal, round, and reactive to light.       Significant Labs:   CBC:   Recent Labs   Lab 12/21/20  0730   WBC 7.30   HGB 14.2   HCT 44.7        CMP:   Recent Labs   Lab 12/21/20  0730      K 3.7      CO2 25   *   BUN 9   CREATININE 0.8   CALCIUM 8.7   PROT 6.9   ALBUMIN 4.2    BILITOT 0.4   ALKPHOS 107   AST 20   ALT 21   ANIONGAP 12   EGFRNONAA >60     Cardiac Markers:   Recent Labs   Lab 12/21/20  0730   BNP 59     Magnesium: No results for input(s): MG in the last 48 hours.  Troponin:   Recent Labs   Lab 12/21/20  0730   TROPONINI <0.006     All pertinent labs within the past 24 hours have been reviewed.    Significant Imaging: I have reviewed all pertinent imaging results/findings within the past 24 hours.     Imaging Results          CTA Chest Non-Coronary (PE Study) (Final result)  Result time 12/21/20 09:58:58    Final result by Monika Milton MD (12/21/20 09:58:58)             Impression:      No evidence for acute pulmonary thromboembolism through the segmental pulmonary arteries.    Bibasilar atelectasis.  Otherwise, clear lungs.      Electronically signed by: Monika Milton  Date:    12/21/2020  Time:    09:58           Narrative:    EXAMINATION:  CTA CHEST NON CORONARY    CLINICAL HISTORY:  PE suspected, intermediate prob, positive D-dimer;    TECHNIQUE:  Technique: Contiguous 1.25-mm axial images were obtained from the thoracic inlet to the level upper abdomen following the administration of 75 cc of intravenous Omnipaque 350 Sagittal and coronal reformats were submitted.    COMPARISON:  None    FINDINGS:  No intraluminal filling defect in the trunk, main, lobar or segmental pulmonary arteries to suggest acute pulmonary embolus.    Trachea and proximal airways are patent.  The lungs are expanded. No acute consolidation, pleural effusion, or pneumothorax seen.  Dependent atelectasis.    The aorta is normal caliber.  No mediastinal or hilar lymph node enlargement.  No pericardial effusion.                             X-Ray Chest AP Portable (Final result)  Result time 12/21/20 07:46:40    Final result by Jeff St DO (12/21/20 07:46:40)             Impression:      No acute cardiopulmonary abnormality.      Electronically signed by: Jeff  Alma Rosa  Date:    12/21/2020  Time:    07:46           Narrative:    EXAMINATION:  XR CHEST AP PORTABLE    CLINICAL HISTORY:  Chest Pain.    TECHNIQUE:  Single frontal view of the chest was performed.    COMPARISON:  None    FINDINGS:  The lungs are well expanded and clear. No focal opacities are seen. The pleural spaces are clear.  The cardiac silhouette is unremarkable.  The visualized osseous structures are unremarkable.

## 2020-12-21 NOTE — HPI
"Marjorie Harkins is a 70 year old woman with medical history of hypertension, factor V Leiden deficiency with history of deep vein thrombosis with IVC filter placement and benign postional vertigo.  She presented to the [facility] ED for evaluation of acute chest discomfort. The patient reported that she awoke at approximately 3:00 am with epigastric pain.  She describes the pain as a "heaviness" across her chest, but centered in epigastric region.  She reported radiation of pain to right flank and noted one episode of nausea and dry heaving.  She states she took an two aspirin without relief.  She denies shortness of breath or diaphoresis.   On arrival to ED, patient was found to be severely hypertensive, 221/97.  Electrocardiogram showed no evidence of acute ischemia and initial troponin negative.  Given history of factor V Leiden deficiency, CTA chest was completed and showed no evidence of acute pulmonary embolism.  The patient's hypertension was treated with her home dose of losartan 50 mg and one dose of intravenous morphine with improvement to patient's blood pressure and resolution of chest pain.  She will be admitted for observation and to rule out cardiac source of chest pain.    "

## 2020-12-21 NOTE — ED PROVIDER NOTES
Encounter Date: 12/21/2020    SCRIBE #1 NOTE: Josué GAYTAN am scribing for, and in the presence of, Hiren Perez MD.       History     Chief Complaint   Patient presents with    Chest Pain     chest pain since 0330am     70-year-old female with a past medical history of Deep vein thrombosis and Hypertension who presents to the emergency department with c/o right chest pain radiating to right side of back with associated nausea and dry heaving that began at 3:30 am this morning. She describes the pain as pressure sensation of the chest and a sharp sensation in her back. She denies shortness of breath or vomiting. She reports that she normally experiences bilateral leg swelling, right greater than left, but denies any current bilateral leg swelling. She was attempting treatment with x2 Aspirin taken x1 at 3:30 am and x1 at 4:30 am today with no improvements. She reports x2 similar symptoms in the past noting 8 months ago, but was not seen by provider stating that symptoms had resolved. Patient denies any cigarette smoking, but notes that she had in college years ago. She notes a past family history of heart conditions. She also reports a past medical history of IVC filter in right leg due to right vein clotting in 1995. NKDA.       The history is provided by the patient.     Review of patient's allergies indicates:  No Known Allergies  Past Medical History:   Diagnosis Date    Deep vein thrombosis (DVT) 2002    Hypertension      Past Surgical History:   Procedure Laterality Date    APPENDECTOMY      HYSTERECTOMY      TONSILLECTOMY       No family history on file.  Social History     Tobacco Use    Smoking status: Never Smoker    Smokeless tobacco: Never Used   Substance Use Topics    Alcohol use: Yes     Alcohol/week: 4.0 standard drinks     Types: 4 Glasses of wine per week    Drug use: No     Review of Systems   Constitutional: Negative for chills and fever.   HENT: Negative for rhinorrhea, sore  throat and trouble swallowing.    Respiratory: Negative for chest tightness, shortness of breath and wheezing.    Cardiovascular: Negative for chest pain, palpitations and leg swelling.        Positive for right sided chest pain radiating to right side of back   Gastrointestinal: Positive for nausea. Negative for abdominal pain, diarrhea and vomiting.        Positive for dry heaving   Genitourinary: Negative for dysuria and vaginal bleeding.   Musculoskeletal:        Negative for bilateral leg swelling   Neurological: Negative for speech difficulty and light-headedness.   All other systems reviewed and are negative.      Physical Exam     Initial Vitals [12/21/20 0707]   BP Pulse Resp Temp SpO2   (!) 221/97 69 (!) 22 97.7 °F (36.5 °C) 99 %      MAP       --         Physical Exam    Nursing note and vitals reviewed.  Constitutional: She appears well-developed and well-nourished. She is not diaphoretic. No distress.   HENT:   Head: Normocephalic and atraumatic.   Eyes: Conjunctivae and EOM are normal. Pupils are equal, round, and reactive to light.   Neck: Normal range of motion. Neck supple. No tracheal deviation present. No JVD present.   Cardiovascular: Normal rate, regular rhythm, normal heart sounds and intact distal pulses. Exam reveals no gallop and no friction rub.    No murmur heard.  Pulmonary/Chest: Breath sounds normal. No respiratory distress. She has no wheezes. She has no rhonchi. She has no rales. She exhibits no tenderness.   Abdominal: Soft. Bowel sounds are normal. She exhibits no distension and no mass. There is no abdominal tenderness. There is no rebound and no guarding.   Musculoskeletal: Normal range of motion. No tenderness or edema.   Neurological: She is alert and oriented to person, place, and time. She has normal strength. No cranial nerve deficit.   Skin: Skin is warm and dry. Capillary refill takes less than 2 seconds. No rash noted. No erythema.   Psychiatric: She has a normal mood and  affect. Thought content normal.         ED Course   Procedures  Labs Reviewed   CBC W/ AUTO DIFFERENTIAL - Abnormal; Notable for the following components:       Result Value    MCHC 31.8 (*)     All other components within normal limits   COMPREHENSIVE METABOLIC PANEL - Abnormal; Notable for the following components:    Glucose 132 (*)     All other components within normal limits   D DIMER, QUANTITATIVE - Abnormal; Notable for the following components:    D-Dimer 0.67 (*)     All other components within normal limits   TROPONIN I   B-TYPE NATRIURETIC PEPTIDE   D DIMER, QUANTITATIVE   TROPONIN I   SARS-COV-2 RDRP GENE    Narrative:     This test utilizes isothermal nucleic acid amplification   technology to detect the SARS-CoV-2 RdRp nucleic acid segment.   The analytical sensitivity (limit of detection) is 125 genome   equivalents/mL.   A POSITIVE result implies infection with the SARS-CoV-2 virus;   the patient is presumed to be contagious.     A NEGATIVE result means that SARS-CoV-2 nucleic acids are not   present above the limit of detection. A NEGATIVE result should be   treated as presumptive. It does not rule out the possibility of   COVID-19 and should not be the sole basis for treatment decisions.   If COVID-19 is strongly suspected based on clinical and exposure   history, re-testing using an alternate molecular assay should be   considered.   This test is only for use under the Food and Drug   Administration s Emergency Use Authorization (EUA).   Commercial kits are provided by [employer].   Performance characteristics of the EUA have been independently   verified by [facility] Department of   Pathology and Laboratory Medicine.   _________________________________________________________________   The authorized Fact Sheet for Healthcare Providers and the authorized Fact   Sheet for Patients of the ID NOW COVID-19 are available on the FDA   website:      https://www.fda.gov/media/042767/download  https://www.fda.gov/media/516223/download           EKG Readings: (Independently Interpreted)   Initial Reading: No STEMI. Rhythm: Normal Sinus Rhythm. Heart Rate: 70.   0718: Incomplete right bundle branch block.   0802: Compared to 0718. Sinus bradycardia 56. No STEMI. When compared to previous sinus bradycardia replaced normal sinus rhythm.        Imaging Results          CTA Chest Non-Coronary (PE Study) (Final result)  Result time 12/21/20 09:58:58    Final result by Monika Milton MD (12/21/20 09:58:58)                 Impression:      No evidence for acute pulmonary thromboembolism through the segmental pulmonary arteries.    Bibasilar atelectasis.  Otherwise, clear lungs.      Electronically signed by: Monika Milton  Date:    12/21/2020  Time:    09:58             Narrative:    EXAMINATION:  CTA CHEST NON CORONARY    CLINICAL HISTORY:  PE suspected, intermediate prob, positive D-dimer;    TECHNIQUE:  Technique: Contiguous 1.25-mm axial images were obtained from the thoracic inlet to the level upper abdomen following the administration of 75 cc of intravenous Omnipaque 350 Sagittal and coronal reformats were submitted.    COMPARISON:  None    FINDINGS:  No intraluminal filling defect in the trunk, main, lobar or segmental pulmonary arteries to suggest acute pulmonary embolus.    Trachea and proximal airways are patent.  The lungs are expanded. No acute consolidation, pleural effusion, or pneumothorax seen.  Dependent atelectasis.    The aorta is normal caliber.  No mediastinal or hilar lymph node enlargement.  No pericardial effusion.                               X-Ray Chest AP Portable (Final result)  Result time 12/21/20 07:46:40    Final result by Jeff St DO (12/21/20 07:46:40)                 Impression:      No acute cardiopulmonary abnormality.      Electronically signed by: Jeff St  Date:    12/21/2020  Time:    07:46              Narrative:    EXAMINATION:  XR CHEST AP PORTABLE    CLINICAL HISTORY:  Chest Pain.    TECHNIQUE:  Single frontal view of the chest was performed.    COMPARISON:  None    FINDINGS:  The lungs are well expanded and clear. No focal opacities are seen. The pleural spaces are clear.  The cardiac silhouette is unremarkable.  The visualized osseous structures are unremarkable.                              X-Rays:   Independently Interpreted Readings:   Chest X-Ray: Normal cardiac silhouette. No focal infiltrate. No pleural effusion.      Medical Decision Making:   History:   Old Medical Records: I decided to obtain old medical records.  Differential Diagnosis:   Acute coronary syndrome, aortic dissection, pulmonary embolism, tension pneumothorax, pericardial tamponade, mediastinitis, esophageal rupture, valvular heart disease, pericarditis, pneumonia, esophageal spasm, COPD exacerbation, CHF exacerbation, hypertensive crisis    Clinical Tests:   Radiological Study: Reviewed and Ordered  Medical Tests: Reviewed and Ordered  ED Management:  62-year-old female who presented for significant chest pressure that had woken her up, patient presented with significant elevated blood pressure, but since she also has a history of DVT we pursued treatment of her chest pain as possible ACS/hypertensive urgency and PE.  Patient's blood pressure improved with nitroglycerin as that her chest pain, but after the 3rd nitroglycerin chest pain persisted and I did not want to lower her blood pressure any further so we opted to use morphine for the chest pain.  Patient's chest pain resolved after the morphine.  Given her symptoms, felt it prudent to admit the patient for evaluation.            Scribe Attestation:   Scribe #1: I performed the above scribed service and the documentation accurately describes the services I performed. I attest to the accuracy of the note.    Attending Attestation:           Physician Attestation for  Scribe:  Physician Attestation Statement for Scribe #1: I, Hiren Perez MD, reviewed documentation, as scribed by Josué Multani in my presence, and it is both accurate and complete.                 ED Course as of Dec 21 1351   Mon Dec 21, 2020   1049 10:49 AM  Spoke to Dr. Duke. Admit patient for observation to him.     [DJ]      ED Course User Index  [DJ] Josué Multani            Clinical Impression:       ICD-10-CM ICD-9-CM   1. Hypertensive urgency  I16.0 401.9   2. Chest pain  R07.9 786.50   3. Chest pain, unspecified type  R07.9 786.50   4. Acute chest pain  R07.9 786.50                          ED Disposition Condition    Observation                             Hiren Perez MD  12/21/20 7107

## 2020-12-21 NOTE — ED NOTES
"Chief Complaint   Patient presents with    Chest Pain     chest pain since 0330am     Patient presents to the ED with c/o chest tightness/pressure that she states has been going on since around 3-330 this AM.  Patient describes the pain "like a rubber band is around my chest" that has been constant.  Patient states some SOA.  Patient states that she has had something similar in the past but that it had gone away.  Patient denies fever, VDC.  Patient states some nausea.  Patient is AOx4, respirations even, unlabored.  Patient on continuous cardiac monitor, automatic BP cuff, and pulse oximeter.  "

## 2020-12-21 NOTE — NURSING
Arrived to unit per w/c. Family at bedside. Able to ambulate to bed. Reports chest discomfort rating 2 on a scale of 0-10. Placed on telemetry. Left antecubital IV site intact. Skin intact. EROS's and SCD's placed. Oriented to room and facility. Bed in lowest position. Brakes engaged. Call button in reach.

## 2020-12-21 NOTE — ED NOTES
"Patient tearful, stating "I just don't know what's going on."  Patient's friend at the bedside comforting patient.  Patient update on POC and when blood work comes back someone will be in to talk with her.  Patient on continuous cardiac monitor, automatic BP cuff, and pulse oximeter.  "

## 2020-12-21 NOTE — ED NOTES
Patient resting comfortably in bed stating that pain has decrased.  Patient in NAD.  Respirations even, unlabored.  Bed rails up x2.  Patient on continuous cardiac monitor, BP cuff, and pulse oximeter.  Patient denies needs/complaints at this time.  Patient updated on POC

## 2020-12-21 NOTE — PLAN OF CARE
CM met with pt for initial discharge planning assessment. Pt is alert and oriented at time of assessment.    Pt confirmed demographic information is correct in [facility], PCP is correct Dr. Chatman, and pharmacy of choice is [facility]. CM to add to Epic.    Pt is independent with ADL's, uses no DME and has no HH. Pt not on HD and does not take coumadin.    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 Pt confirms she has help when needed.    No CM needs anticipated for discharge.   12/21/20 7861   Discharge Assessment   Assessment Type Discharge Planning Assessment   Confirmed/corrected address and phone number on facesheet? Yes   Assessment information obtained from? Patient;Medical Record   Expected Length of Stay (days) 2   Communicated expected length of stay with patient/caregiver yes   Prior to hospitilization cognitive status: Alert/Oriented   Prior to hospitalization functional status: Independent   Current cognitive status: Alert/Oriented   Current Functional Status: Independent   Lives With friend(s)   Able to Return to Prior Arrangements yes   Patient's perception of discharge disposition home or selfcare   Readmission Within the Last 30 Days no previous admission in last 30 days   Patient currently being followed by outpatient case management? No   Patient currently receives any other outside agency services? No   Equipment Currently Used at Home none   Do you have any problems affording any of your prescribed medications? No   Is the patient taking medications as prescribed? yes   Does the patient have transportation home? Yes   Transportation Anticipated family or friend will provide   Does the patient receive services at the [facility]? No   Discharge Plan A Home   Discharge Plan B Home   DME Needed Upon Discharge  none   Patient/Family in Agreement with Plan yes

## 2020-12-22 ENCOUNTER — ANESTHESIA (OUTPATIENT)
Dept: SURGERY | Facility: OTHER | Age: 70
End: 2020-12-22
Payer: COMMERCIAL

## 2020-12-22 ENCOUNTER — ANESTHESIA EVENT (OUTPATIENT)
Dept: SURGERY | Facility: OTHER | Age: 70
End: 2020-12-22
Payer: COMMERCIAL

## 2020-12-22 PROBLEM — Z86.718 HISTORY OF THROMBOSIS: Status: ACTIVE | Noted: 2020-12-22

## 2020-12-22 PROBLEM — D68.51 FACTOR V LEIDEN MUTATION: Status: ACTIVE | Noted: 2020-12-22

## 2020-12-22 PROBLEM — Z82.49 FAMILY HISTORY OF BLOOD CLOTS: Status: ACTIVE | Noted: 2020-12-22

## 2020-12-22 PROBLEM — I82.90 VTE (VENOUS THROMBOEMBOLISM): Status: ACTIVE | Noted: 2020-12-22

## 2020-12-22 PROBLEM — K81.0 ACUTE CHOLECYSTITIS: Status: ACTIVE | Noted: 2020-12-22

## 2020-12-22 LAB
ALBUMIN SERPL BCP-MCNC: 3.3 G/DL (ref 3.5–5.2)
ALP SERPL-CCNC: 118 U/L (ref 55–135)
ALT SERPL W/O P-5'-P-CCNC: 104 U/L (ref 10–44)
ANION GAP SERPL CALC-SCNC: 9 MMOL/L (ref 8–16)
AST SERPL-CCNC: 77 U/L (ref 10–40)
BASOPHILS # BLD AUTO: 0.02 K/UL (ref 0–0.2)
BASOPHILS NFR BLD: 0.3 % (ref 0–1.9)
BILIRUB DIRECT SERPL-MCNC: 0.4 MG/DL (ref 0.1–0.3)
BILIRUB SERPL-MCNC: 0.7 MG/DL (ref 0.1–1)
BUN SERPL-MCNC: 9 MG/DL (ref 8–23)
CALCIUM SERPL-MCNC: 8.4 MG/DL (ref 8.7–10.5)
CHLORIDE SERPL-SCNC: 107 MMOL/L (ref 95–110)
CHOLEST SERPL-MCNC: 198 MG/DL (ref 120–199)
CHOLEST/HDLC SERPL: 2.3 {RATIO} (ref 2–5)
CO2 SERPL-SCNC: 25 MMOL/L (ref 23–29)
CREAT SERPL-MCNC: 0.7 MG/DL (ref 0.5–1.4)
DIFFERENTIAL METHOD: NORMAL
EOSINOPHIL # BLD AUTO: 0.2 K/UL (ref 0–0.5)
EOSINOPHIL NFR BLD: 2.4 % (ref 0–8)
ERYTHROCYTE [DISTWIDTH] IN BLOOD BY AUTOMATED COUNT: 13.1 % (ref 11.5–14.5)
EST. GFR  (AFRICAN AMERICAN): >60 ML/MIN/1.73 M^2
EST. GFR  (NON AFRICAN AMERICAN): >60 ML/MIN/1.73 M^2
GLUCOSE SERPL-MCNC: 110 MG/DL (ref 70–110)
HCT VFR BLD AUTO: 39.5 % (ref 37–48.5)
HDLC SERPL-MCNC: 87 MG/DL (ref 40–75)
HDLC SERPL: 43.9 % (ref 20–50)
HGB BLD-MCNC: 13.1 G/DL (ref 12–16)
IMM GRANULOCYTES # BLD AUTO: 0.02 K/UL (ref 0–0.04)
IMM GRANULOCYTES NFR BLD AUTO: 0.3 % (ref 0–0.5)
LDLC SERPL CALC-MCNC: 95 MG/DL (ref 63–159)
LYMPHOCYTES # BLD AUTO: 1.2 K/UL (ref 1–4.8)
LYMPHOCYTES NFR BLD: 18.6 % (ref 18–48)
MCH RBC QN AUTO: 30 PG (ref 27–31)
MCHC RBC AUTO-ENTMCNC: 33.2 G/DL (ref 32–36)
MCV RBC AUTO: 90 FL (ref 82–98)
MONOCYTES # BLD AUTO: 0.6 K/UL (ref 0.3–1)
MONOCYTES NFR BLD: 8.7 % (ref 4–15)
NEUTROPHILS # BLD AUTO: 4.7 K/UL (ref 1.8–7.7)
NEUTROPHILS NFR BLD: 69.7 % (ref 38–73)
NONHDLC SERPL-MCNC: 111 MG/DL
NRBC BLD-RTO: 0 /100 WBC
PLATELET # BLD AUTO: 150 K/UL (ref 150–350)
PMV BLD AUTO: 11.3 FL (ref 9.2–12.9)
POTASSIUM SERPL-SCNC: 3.8 MMOL/L (ref 3.5–5.1)
PROT SERPL-MCNC: 5.7 G/DL (ref 6–8.4)
RBC # BLD AUTO: 4.37 M/UL (ref 4–5.4)
SODIUM SERPL-SCNC: 141 MMOL/L (ref 136–145)
TRIGL SERPL-MCNC: 80 MG/DL (ref 30–150)
WBC # BLD AUTO: 6.67 K/UL (ref 3.9–12.7)

## 2020-12-22 PROCEDURE — G0378 HOSPITAL OBSERVATION PER HR: HCPCS

## 2020-12-22 PROCEDURE — 25000003 PHARM REV CODE 250: Performed by: NURSE ANESTHETIST, CERTIFIED REGISTERED

## 2020-12-22 PROCEDURE — 36000708 HC OR TIME LEV III 1ST 15 MIN: Performed by: SPECIALIST

## 2020-12-22 PROCEDURE — 25000003 PHARM REV CODE 250: Performed by: EMERGENCY MEDICINE

## 2020-12-22 PROCEDURE — 99204 OFFICE O/P NEW MOD 45 MIN: CPT | Mod: ,,, | Performed by: STUDENT IN AN ORGANIZED HEALTH CARE EDUCATION/TRAINING PROGRAM

## 2020-12-22 PROCEDURE — 63600175 PHARM REV CODE 636 W HCPCS: Performed by: NURSE ANESTHETIST, CERTIFIED REGISTERED

## 2020-12-22 PROCEDURE — 63600175 PHARM REV CODE 636 W HCPCS: Performed by: SPECIALIST

## 2020-12-22 PROCEDURE — 25000003 PHARM REV CODE 250: Performed by: NURSE PRACTITIONER

## 2020-12-22 PROCEDURE — 99226 PR SUBSEQUENT OBSERVATION CARE,LEVEL III: CPT | Mod: ,,, | Performed by: PHYSICIAN ASSISTANT

## 2020-12-22 PROCEDURE — 88304 TISSUE EXAM BY PATHOLOGIST: CPT | Performed by: PATHOLOGY

## 2020-12-22 PROCEDURE — 88304 TISSUE EXAM BY PATHOLOGIST: CPT | Mod: 26,,, | Performed by: PATHOLOGY

## 2020-12-22 PROCEDURE — 99204 PR OFFICE/OUTPT VISIT, NEW, LEVL IV, 45-59 MIN: ICD-10-PCS | Mod: ,,, | Performed by: STUDENT IN AN ORGANIZED HEALTH CARE EDUCATION/TRAINING PROGRAM

## 2020-12-22 PROCEDURE — 85025 COMPLETE CBC W/AUTO DIFF WBC: CPT

## 2020-12-22 PROCEDURE — 88304 PR  SURG PATH,LEVEL III: ICD-10-PCS | Mod: 26,,, | Performed by: PATHOLOGY

## 2020-12-22 PROCEDURE — A4216 STERILE WATER/SALINE, 10 ML: HCPCS | Performed by: NURSE PRACTITIONER

## 2020-12-22 PROCEDURE — 99226 PR SUBSEQUENT OBSERVATION CARE,LEVEL III: ICD-10-PCS | Mod: ,,, | Performed by: PHYSICIAN ASSISTANT

## 2020-12-22 PROCEDURE — 96372 THER/PROPH/DIAG INJ SC/IM: CPT

## 2020-12-22 PROCEDURE — 71000033 HC RECOVERY, INTIAL HOUR: Performed by: SPECIALIST

## 2020-12-22 PROCEDURE — 63600175 PHARM REV CODE 636 W HCPCS: Performed by: PHYSICIAN ASSISTANT

## 2020-12-22 PROCEDURE — 96375 TX/PRO/DX INJ NEW DRUG ADDON: CPT

## 2020-12-22 PROCEDURE — 37000009 HC ANESTHESIA EA ADD 15 MINS: Performed by: SPECIALIST

## 2020-12-22 PROCEDURE — A4216 STERILE WATER/SALINE, 10 ML: HCPCS | Performed by: SPECIALIST

## 2020-12-22 PROCEDURE — 80061 LIPID PANEL: CPT

## 2020-12-22 PROCEDURE — 27201423 OPTIME MED/SURG SUP & DEVICES STERILE SUPPLY: Performed by: SPECIALIST

## 2020-12-22 PROCEDURE — 36415 COLL VENOUS BLD VENIPUNCTURE: CPT

## 2020-12-22 PROCEDURE — 80048 BASIC METABOLIC PNL TOTAL CA: CPT

## 2020-12-22 PROCEDURE — 80076 HEPATIC FUNCTION PANEL: CPT

## 2020-12-22 PROCEDURE — 36000709 HC OR TIME LEV III EA ADD 15 MIN: Performed by: SPECIALIST

## 2020-12-22 PROCEDURE — 94761 N-INVAS EAR/PLS OXIMETRY MLT: CPT

## 2020-12-22 PROCEDURE — 27000221 HC OXYGEN, UP TO 24 HOURS

## 2020-12-22 PROCEDURE — 25000003 PHARM REV CODE 250: Performed by: SPECIALIST

## 2020-12-22 PROCEDURE — 25000003 PHARM REV CODE 250: Performed by: ANESTHESIOLOGY

## 2020-12-22 PROCEDURE — 37000008 HC ANESTHESIA 1ST 15 MINUTES: Performed by: SPECIALIST

## 2020-12-22 PROCEDURE — 71000039 HC RECOVERY, EACH ADD'L HOUR: Performed by: SPECIALIST

## 2020-12-22 PROCEDURE — C1729 CATH, DRAINAGE: HCPCS | Performed by: SPECIALIST

## 2020-12-22 PROCEDURE — 63600175 PHARM REV CODE 636 W HCPCS: Performed by: ANESTHESIOLOGY

## 2020-12-22 RX ORDER — SODIUM CHLORIDE 0.9 % (FLUSH) 0.9 %
3 SYRINGE (ML) INJECTION
Status: DISCONTINUED | OUTPATIENT
Start: 2020-12-22 | End: 2020-12-24 | Stop reason: HOSPADM

## 2020-12-22 RX ORDER — HEPARIN SODIUM 5000 [USP'U]/ML
5000 INJECTION, SOLUTION INTRAVENOUS; SUBCUTANEOUS EVERY 8 HOURS
Status: DISCONTINUED | OUTPATIENT
Start: 2020-12-22 | End: 2020-12-24 | Stop reason: HOSPADM

## 2020-12-22 RX ORDER — LIDOCAINE HCL/PF 100 MG/5ML
SYRINGE (ML) INTRAVENOUS
Status: DISCONTINUED | OUTPATIENT
Start: 2020-12-22 | End: 2020-12-22

## 2020-12-22 RX ORDER — OXYCODONE AND ACETAMINOPHEN 7.5; 325 MG/1; MG/1
1 TABLET ORAL EVERY 4 HOURS PRN
Status: DISCONTINUED | OUTPATIENT
Start: 2020-12-22 | End: 2020-12-24 | Stop reason: HOSPADM

## 2020-12-22 RX ORDER — ROCURONIUM BROMIDE 10 MG/ML
INJECTION, SOLUTION INTRAVENOUS
Status: DISCONTINUED | OUTPATIENT
Start: 2020-12-22 | End: 2020-12-22

## 2020-12-22 RX ORDER — CEFAZOLIN SODIUM 1 G/3ML
INJECTION, POWDER, FOR SOLUTION INTRAMUSCULAR; INTRAVENOUS
Status: DISCONTINUED | OUTPATIENT
Start: 2020-12-22 | End: 2020-12-22

## 2020-12-22 RX ORDER — ONDANSETRON 2 MG/ML
INJECTION INTRAMUSCULAR; INTRAVENOUS
Status: DISCONTINUED | OUTPATIENT
Start: 2020-12-22 | End: 2020-12-22

## 2020-12-22 RX ORDER — HYDROMORPHONE HYDROCHLORIDE 2 MG/ML
0.4 INJECTION, SOLUTION INTRAMUSCULAR; INTRAVENOUS; SUBCUTANEOUS EVERY 5 MIN PRN
Status: DISCONTINUED | OUTPATIENT
Start: 2020-12-22 | End: 2020-12-22 | Stop reason: HOSPADM

## 2020-12-22 RX ORDER — FENTANYL CITRATE 50 UG/ML
INJECTION, SOLUTION INTRAMUSCULAR; INTRAVENOUS
Status: DISCONTINUED | OUTPATIENT
Start: 2020-12-22 | End: 2020-12-22

## 2020-12-22 RX ORDER — ACETAMINOPHEN 10 MG/ML
INJECTION, SOLUTION INTRAVENOUS
Status: DISCONTINUED | OUTPATIENT
Start: 2020-12-22 | End: 2020-12-22

## 2020-12-22 RX ORDER — PHENYLEPHRINE HYDROCHLORIDE 10 MG/ML
INJECTION INTRAVENOUS
Status: DISCONTINUED | OUTPATIENT
Start: 2020-12-22 | End: 2020-12-22

## 2020-12-22 RX ORDER — DOCUSATE SODIUM 100 MG/1
100 CAPSULE, LIQUID FILLED ORAL DAILY
Status: DISCONTINUED | OUTPATIENT
Start: 2020-12-23 | End: 2020-12-24 | Stop reason: HOSPADM

## 2020-12-22 RX ORDER — HYDROMORPHONE HYDROCHLORIDE 1 MG/ML
0.5 INJECTION, SOLUTION INTRAMUSCULAR; INTRAVENOUS; SUBCUTANEOUS
Status: DISCONTINUED | OUTPATIENT
Start: 2020-12-22 | End: 2020-12-24 | Stop reason: HOSPADM

## 2020-12-22 RX ORDER — ONDANSETRON 2 MG/ML
4 INJECTION INTRAMUSCULAR; INTRAVENOUS DAILY PRN
Status: DISCONTINUED | OUTPATIENT
Start: 2020-12-22 | End: 2020-12-22 | Stop reason: HOSPADM

## 2020-12-22 RX ORDER — DEXAMETHASONE SODIUM PHOSPHATE 4 MG/ML
INJECTION, SOLUTION INTRA-ARTICULAR; INTRALESIONAL; INTRAMUSCULAR; INTRAVENOUS; SOFT TISSUE
Status: DISCONTINUED | OUTPATIENT
Start: 2020-12-22 | End: 2020-12-22

## 2020-12-22 RX ORDER — OXYCODONE HYDROCHLORIDE 5 MG/1
5 TABLET ORAL
Status: DISCONTINUED | OUTPATIENT
Start: 2020-12-22 | End: 2020-12-22 | Stop reason: HOSPADM

## 2020-12-22 RX ORDER — PROPOFOL 10 MG/ML
VIAL (ML) INTRAVENOUS
Status: DISCONTINUED | OUTPATIENT
Start: 2020-12-22 | End: 2020-12-22

## 2020-12-22 RX ORDER — MEPERIDINE HYDROCHLORIDE 25 MG/ML
12.5 INJECTION INTRAMUSCULAR; INTRAVENOUS; SUBCUTANEOUS ONCE AS NEEDED
Status: DISCONTINUED | OUTPATIENT
Start: 2020-12-22 | End: 2020-12-22 | Stop reason: HOSPADM

## 2020-12-22 RX ADMIN — FENTANYL CITRATE 50 MCG: 50 INJECTION, SOLUTION INTRAMUSCULAR; INTRAVENOUS at 11:12

## 2020-12-22 RX ADMIN — PANTOPRAZOLE SODIUM 40 MG: 40 TABLET, DELAYED RELEASE ORAL at 09:12

## 2020-12-22 RX ADMIN — ESMOLOL HYDROCHLORIDE 10 MG: 10 INJECTION INTRAVENOUS at 12:12

## 2020-12-22 RX ADMIN — PHENYLEPHRINE HYDROCHLORIDE 100 MCG: 10 INJECTION INTRAVENOUS at 11:12

## 2020-12-22 RX ADMIN — SUGAMMADEX 400 MG: 100 INJECTION, SOLUTION INTRAVENOUS at 01:12

## 2020-12-22 RX ADMIN — HEPARIN SODIUM 5000 UNITS: 5000 INJECTION INTRAVENOUS; SUBCUTANEOUS at 09:12

## 2020-12-22 RX ADMIN — ONDANSETRON 8 MG: 8 TABLET, ORALLY DISINTEGRATING ORAL at 08:12

## 2020-12-22 RX ADMIN — ROCURONIUM BROMIDE 50 MG: 10 INJECTION, SOLUTION INTRAVENOUS at 11:12

## 2020-12-22 RX ADMIN — ONDANSETRON HYDROCHLORIDE 4 MG: 2 INJECTION INTRAMUSCULAR; INTRAVENOUS at 11:12

## 2020-12-22 RX ADMIN — DEXAMETHASONE SODIUM PHOSPHATE 8 MG: 4 INJECTION, SOLUTION INTRAMUSCULAR; INTRAVENOUS at 11:12

## 2020-12-22 RX ADMIN — PROPOFOL 40 MG: 10 INJECTION, EMULSION INTRAVENOUS at 12:12

## 2020-12-22 RX ADMIN — PROPOFOL 200 MG: 10 INJECTION, EMULSION INTRAVENOUS at 11:12

## 2020-12-22 RX ADMIN — PIPERACILLIN AND TAZOBACTAM 4.5 G: 4; .5 INJECTION, POWDER, LYOPHILIZED, FOR SOLUTION INTRAVENOUS; PARENTERAL at 05:12

## 2020-12-22 RX ADMIN — GLYCOPYRROLATE 0.2 MG: 0.2 INJECTION, SOLUTION INTRAMUSCULAR; INTRAVITREAL at 11:12

## 2020-12-22 RX ADMIN — Medication 3 ML: at 09:12

## 2020-12-22 RX ADMIN — LOSARTAN POTASSIUM 50 MG: 50 TABLET, FILM COATED ORAL at 09:12

## 2020-12-22 RX ADMIN — LIDOCAINE HYDROCHLORIDE 60 MG: 20 INJECTION, SOLUTION INTRAVENOUS at 11:12

## 2020-12-22 RX ADMIN — HYDROMORPHONE HYDROCHLORIDE 0.4 MG: 2 INJECTION INTRAMUSCULAR; INTRAVENOUS; SUBCUTANEOUS at 02:12

## 2020-12-22 RX ADMIN — ESMOLOL HYDROCHLORIDE 10 MG: 10 INJECTION INTRAVENOUS at 11:12

## 2020-12-22 RX ADMIN — ONDANSETRON 4 MG: 2 INJECTION INTRAMUSCULAR; INTRAVENOUS at 02:12

## 2020-12-22 RX ADMIN — OXYCODONE AND ACETAMINOPHEN 1 TABLET: 7.5; 325 TABLET ORAL at 05:12

## 2020-12-22 RX ADMIN — Medication 3 ML: at 05:12

## 2020-12-22 RX ADMIN — FENTANYL CITRATE 50 MCG: 50 INJECTION, SOLUTION INTRAMUSCULAR; INTRAVENOUS at 01:12

## 2020-12-22 RX ADMIN — CEFAZOLIN 2 G: 330 INJECTION, POWDER, FOR SOLUTION INTRAMUSCULAR; INTRAVENOUS at 11:12

## 2020-12-22 RX ADMIN — OXYCODONE 5 MG: 5 TABLET ORAL at 02:12

## 2020-12-22 RX ADMIN — ACETAMINOPHEN 1000 MG: 10 INJECTION, SOLUTION INTRAVENOUS at 11:12

## 2020-12-22 RX ADMIN — OXYCODONE AND ACETAMINOPHEN 1 TABLET: 7.5; 325 TABLET ORAL at 08:12

## 2020-12-22 RX ADMIN — FENTANYL CITRATE 50 MCG: 50 INJECTION, SOLUTION INTRAMUSCULAR; INTRAVENOUS at 12:12

## 2020-12-22 NOTE — ASSESSMENT & PLAN NOTE
Chest Pain, Rule Out Acute Myocardial Infarction  - EKG without  any acute ST/T wave changes  - Troponin trended negative  - Known cardiac risk factors: factor V Leiden mutation, family history, uncontrolled hypertension, most recent lipid panel 10/2020 with elevated total cholesterol and increased LDL   - Will recommend follow up with Cardiology or PCP for possible cardiac testing for risk stratification  - see acute cholecystitis

## 2020-12-22 NOTE — OP NOTE
[facility]  Surgery Department  Operative Note    SUMMARY     Patient: Marjorie Harkins    Medical Record: [id number]    Date of Procedure: 12/22/2020     Surgeon: Surgeon(s) and Role:     * Garry Marquez Jr., MD - Primary    Assisting Surgeon: None    Pre-Operative Diagnosis: Calculus of gallbladder without cholecystitis without obstruction [K80.20]    Post-Operative Diagnosis acute chronic cholecystitis    Procedure: Procedure(s) (LRB):  CHOLECYSTECTOMY, LAPAROSCOPIC (N/A)    Procedure in Detail:  The patient was brought to the operating room and placed in the supine position, the abdomen prepped and draped in a sterile fashion.  Small incision made in the left upper quadrant a Veress needle inserted and a pneumoperitoneum achieved.  A 5 mm Optiview trocar inserted in the midclavicular line of the right upper quadrant.  Under direct observation  two additional 5 mm trocars were inserted:  The 1st in the midclavicular line of the right upper quadrant, the 2nd just medial to the anterior axillary line in the right upper quadrant.  A 10 mm trocar was inserted under direct observation in the midline just above the umbilicus.  The gallbladder visualized and seen to be acutely and chronically inflamed.  Using blunt dissection adhesions to the gallbladder were taken down exposing the cystic artery and duct.  These were doubly clamped proximally and then transected.  The gallbladder was removed from the liver bed with the use of the electrocautery spatula.  Under direct observation with the aid of an Endo-Catch the gallbladder was removed through the periumbilical port. A 15 Neri drain was placed in the subhepatic space and brought through a stab wound in the right upper quadrant.  The drain was secured with 2 0 silk.  The peritoneal cavity then irrigated and inspected.  The pneumoperitoneum released.  The fascia the umbilicus closed with 0 Vicryl and the skin with running 4 Monocryl.  The wounds then  sterilely dressed.  The patient tolerated the procedure well left the operating room in good condition.  At the end the procedure all sponge lap and instrument counts were correct.  Estimated blood loss 100 cc

## 2020-12-22 NOTE — CONSULTS
[facility]  Consult Note      Date of Consultation:12/22/2020    Reason for Consult:  Abdominal pain  SUBJECTIVE:     History of Present Illness:  70-year-old female with 1 day history of substernal and epigastric pain associated with nausea.  Workup for cardiac origin and pulmonary embolus were negative.  Patient with tenderness in the right upper quadrant with imaging study showing cholelithiasis with gallbladder wall thickening.   No fever, chills, jaundice, unexplained weight loss, change in bowel habits.   Patient's past medical history is significant for factor 5 Leiden deficiency with history of IVC filter.    Review of patient's allergies indicates:  No Known Allergies    Past Medical History:   Diagnosis Date    Deep vein thrombosis (DVT) 2002    Hypertension      Past Surgical History:   Procedure Laterality Date    APPENDECTOMY      HYSTERECTOMY      TONSILLECTOMY       No family history on file.  Social History     Tobacco Use    Smoking status: Never Smoker    Smokeless tobacco: Never Used   Substance Use Topics    Alcohol use: Yes     Alcohol/week: 4.0 standard drinks     Types: 4 Glasses of wine per week    Drug use: No            Physical Exam:  Afebrile  Vital signs stable  Well-developed well-nourished female in no acute distress  HEENT-anicteric, atraumatic  Neck-supple, trachea midline  Chest-clear  Heart-regular rate and rhythm  Abdomen-soft, tenderness to deep palpation right upper quadrant, no rebound, positive guarding, lower midline incision healed  Extremities-no tenderness, edema    Impression:  Acute cholecystitis/symptomatic gallbladder disease    Plan cholecystectomy    Thank you for the opportunity of seeing Marjorie Harkins in consultation

## 2020-12-22 NOTE — ANESTHESIA PREPROCEDURE EVALUATION
12/22/2020  Marjorie Harkins is a 70 y.o., female.    Anesthesia Evaluation    I have reviewed the Patient Summary Reports.    I have reviewed the Nursing Notes. I have reviewed the NPO Status.   I have reviewed the Medications.     Review of Systems  Anesthesia Hx:  No problems with previous Anesthesia  Denies Family Hx of Anesthesia complications.   Denies Personal Hx of Anesthesia complications.   Social:  Non-Smoker    Hematology/Oncology:  Hematology Normal   Oncology Normal     EENT/Dental:EENT/Dental Normal   Cardiovascular:   Exercise tolerance: good Hypertension DVT 25 years ago. No probs since   Pulmonary:  Pulmonary Normal    Renal/:  Renal/ Normal     Musculoskeletal:  Musculoskeletal Normal    Neurological:  Neurology Normal    Endocrine:  Endocrine Normal    Dermatological:  Skin Normal    Psych:  Psychiatric Normal           Physical Exam  General:  Well nourished    Airway/Jaw/Neck:  Airway Findings: Mouth Opening: Normal Tongue: Normal  General Airway Assessment: Adult  Mallampati: I  TM Distance: Normal, at least 6 cm  Jaw/Neck Findings:  Neck ROM: Normal ROM      Dental:  Dental Findings: In tact             Anesthesia Plan  Type of Anesthesia, risks & benefits discussed:  Anesthesia Type:  general  Patient's Preference:   Intra-op Monitoring Plan: standard ASA monitors  Intra-op Monitoring Plan Comments:   Post Op Pain Control Plan: per primary service following discharge from PACU and multimodal analgesia  Post Op Pain Control Plan Comments:   Induction:   IV  Beta Blocker:         Informed Consent: Patient understands risks and agrees with Anesthesia plan.  Questions answered. Anesthesia consent signed with patient.  ASA Score: 2  emergent   Day of Surgery Review of History & Physical:    H&P update referred to the surgeon.         Ready For Surgery From Anesthesia Perspective.

## 2020-12-22 NOTE — PLAN OF CARE
6:00 PM  Resting comfortably in bed at this time. HTN-VSS on O2 / 2L and weaning back down to RA  and afebrile this shift.  Pain managed per MAR.  Good appetite and good UOP this shift, with BRP    Repositions self independently in bed and with encouragement.   Free from injury or skin breakdown; Fall precautions maintained and call light in reach.  POC updated questions answered and comments acknowledged.  Purposeful hourly rounding completed this shift.    Laps x4 CDI with RLQ JASON Drain care provided   Zosyn Q8hour.

## 2020-12-22 NOTE — ASSESSMENT & PLAN NOTE
- Severe hypertension on admission  - Patient started on her home dose of losartan 50 mg daily, with improvement; suspect pain contributing   - PRN hydralazine for SBP > 170  - monitor

## 2020-12-22 NOTE — ANESTHESIA POSTPROCEDURE EVALUATION
Anesthesia Post Evaluation    Patient: Marjorie Harkins    Procedure(s) Performed: Procedure(s) (LRB):  CHOLECYSTECTOMY, LAPAROSCOPIC (N/A)    Final Anesthesia Type: general      Patient location during evaluation: PACU  Patient participation: Yes- Able to Participate  Level of consciousness: awake and alert  Post-procedure vital signs: reviewed and stable  Pain management: adequate  Airway patency: patent    PONV status at discharge: No PONV  Anesthetic complications: no      Cardiovascular status: blood pressure returned to baseline  Respiratory status: unassisted and room air  Hydration status: euvolemic  Follow-up not needed.          Vitals Value Taken Time   /91 12/22/20 1445   Temp 36.4 °C (97.5 °F) 12/22/20 1345   Pulse 86 12/22/20 1446   Resp 17 12/22/20 1418   SpO2 96 % 12/22/20 1446   Vitals shown include unvalidated device data.      No case tracking events are documented in the log.      Pain/Genna Score: Pain Rating Prior to Med Admin: 4 (12/22/2020  2:18 PM)  Pain Rating Post Med Admin: 4 (12/22/2020  2:15 PM)  Genna Score: 9 (12/22/2020  2:15 PM)

## 2020-12-22 NOTE — ANESTHESIA PROCEDURE NOTES
Intubation  Performed by: Rocco Velasquez Jr., CRNA  Authorized by: Dandre Acosta MD     Intubation:     Induction:  Intravenous    Intubated:  Postinduction    Mask Ventilation:  Easy with oral airway    Attempts:  1    Method of Intubation:  Video laryngoscopy    Blade:  Blake 3    Laryngeal View Grade: Grade I - full view of chords      Difficult Airway Encountered?: No      Complications:  None    Airway Device:  Oral endotracheal tube    Airway Device Size:  7.5    Style/Cuff Inflation:  Cuffed    Tube secured:  22    Secured at:  The lips    Placement Verified By:  Capnometry    Complicating Factors:  None    Findings Post-Intubation:  BS equal bilateral and atraumatic/condition of teeth unchanged

## 2020-12-22 NOTE — PLAN OF CARE
Patient is awake, alert, and oriented x 4. Patient is ambulatory and up to bathroom. Patient denied pain during the night and medications were administered per orders. VSS through the shift on RA. SCDs and telemetry in place. Care clustered to promote rest. Patient remained free from injury and falls throughout the night. Bed locked in lowest position with side rails up x 2. Call light is within reach of patient. Purposeful rounding maintained throughout shift. Patient has no complaints at present. Will continue to monitor.

## 2020-12-22 NOTE — TRANSFER OF CARE
"Anesthesia Transfer of Care Note    Patient: Marjorie Harkins    Procedure(s) Performed: Procedure(s) (LRB):  CHOLECYSTECTOMY, LAPAROSCOPIC (N/A)    Patient location: PACU    Anesthesia Type: general    Transport from OR: Transported from OR on 2-3 L/min O2 by NC with adequate spontaneous ventilation    Post pain: adequate analgesia    Post assessment: no apparent anesthetic complications    Post vital signs: stable    Level of consciousness: awake    Nausea/Vomiting: no nausea/vomiting    Complications: none    Transfer of care protocol was followed      Last vitals:   Visit Vitals  BP (!) 164/83   Pulse 85   Temp 36.9 °C (98.5 °F)   Resp 20   Ht 5' 6" (1.676 m)   Wt 79.3 kg (174 lb 13.2 oz)   SpO2 95%   BMI 28.22 kg/m²     "

## 2020-12-22 NOTE — SUBJECTIVE & OBJECTIVE
Interval History:  Seen early a.m. with noted right upper quadrant tenderness to palpation; patient also seen status post lap juancho, drain with bloody/serosanguineous fluid    Review of Systems   Constitutional: Negative for chills and fever.   HENT: Negative for congestion.    Respiratory: Negative for cough, chest tightness and shortness of breath.    Cardiovascular: Negative for chest pain, palpitations and leg swelling.   Gastrointestinal: Positive for abdominal pain. Negative for abdominal distention, diarrhea, nausea and vomiting.   Genitourinary: Negative for difficulty urinating, dysuria, frequency, hematuria and urgency.   Musculoskeletal: Negative for arthralgias and myalgias.   Neurological: Negative for dizziness, syncope, light-headedness, numbness and headaches.   Psychiatric/Behavioral: Negative for confusion.     Objective:     Vital Signs (Most Recent):  Temp: 97.3 °F (36.3 °C) (12/22/20 1430)  Pulse: 82 (12/22/20 1600)  Resp: 20 (12/22/20 1701)  BP: (!) 158/91 (12/22/20 1445)  SpO2: 97 % (12/22/20 1445) Vital Signs (24h Range):  Temp:  [97.3 °F (36.3 °C)-98.9 °F (37.2 °C)] 97.3 °F (36.3 °C)  Pulse:  [] 82  Resp:  [16-20] 20  SpO2:  [94 %-100 %] 97 %  BP: (138-181)/(73-93) 158/91     Weight: 79.3 kg (174 lb 13.2 oz)  Body mass index is 28.22 kg/m².    Intake/Output Summary (Last 24 hours) at 12/22/2020 1735  Last data filed at 12/22/2020 1000  Gross per 24 hour   Intake 10 ml   Output 675 ml   Net -665 ml      Physical Exam  Vitals signs and nursing note reviewed.   Constitutional:       General: She is not in acute distress.     Appearance: She is well-developed. She is not diaphoretic.   HENT:      Head: Normocephalic and atraumatic.   Eyes:      General: No scleral icterus.     Conjunctiva/sclera: Conjunctivae normal.   Neck:      Musculoskeletal: Normal range of motion and neck supple.   Cardiovascular:      Rate and Rhythm: Normal rate and regular rhythm.      Heart sounds: Normal heart  sounds.   Pulmonary:      Effort: Pulmonary effort is normal. No respiratory distress.      Breath sounds: Normal breath sounds. No wheezing.   Abdominal:      General: Bowel sounds are normal. There is no distension.      Palpations: Abdomen is soft.      Tenderness: There is abdominal tenderness (At incision site).   Musculoskeletal: Normal range of motion.   Skin:     General: Skin is warm and dry.      Capillary Refill: Capillary refill takes less than 2 seconds.   Neurological:      Mental Status: She is alert and oriented to person, place, and time.         Significant Labs:   CBC:   Recent Labs   Lab 12/21/20  0730 12/22/20  0445   WBC 7.30 6.67   HGB 14.2 13.1   HCT 44.7 39.5    150     CMP:   Recent Labs   Lab 12/21/20  0730 12/22/20  0445    141   K 3.7 3.8    107   CO2 25 25   * 110   BUN 9 9   CREATININE 0.8 0.7   CALCIUM 8.7 8.4*   PROT 6.9 5.7*   ALBUMIN 4.2 3.3*   BILITOT 0.4 0.7   ALKPHOS 107 118   AST 20 77*   ALT 21 104*   ANIONGAP 12 9   EGFRNONAA >60 >60     Troponin:   Recent Labs   Lab 12/21/20  1022 12/21/20  1232 12/21/20  1822   TROPONINI 0.008 <0.006 0.009     All pertinent labs within the past 24 hours have been reviewed.    Significant Imaging: I have reviewed all pertinent imaging results/findings within the past 24 hours.   Imaging Results          CTA Chest Non-Coronary (PE Study) (Final result)  Result time 12/21/20 09:58:58    Final result by Monika Milton MD (12/21/20 09:58:58)                 Impression:      No evidence for acute pulmonary thromboembolism through the segmental pulmonary arteries.    Bibasilar atelectasis.  Otherwise, clear lungs.      Electronically signed by: Monika Milton  Date:    12/21/2020  Time:    09:58             Narrative:    EXAMINATION:  CTA CHEST NON CORONARY    CLINICAL HISTORY:  PE suspected, intermediate prob, positive D-dimer;    TECHNIQUE:  Technique: Contiguous 1.25-mm axial images were obtained from the thoracic  inlet to the level upper abdomen following the administration of 75 cc of intravenous Omnipaque 350 Sagittal and coronal reformats were submitted.    COMPARISON:  None    FINDINGS:  No intraluminal filling defect in the trunk, main, lobar or segmental pulmonary arteries to suggest acute pulmonary embolus.    Trachea and proximal airways are patent.  The lungs are expanded. No acute consolidation, pleural effusion, or pneumothorax seen.  Dependent atelectasis.    The aorta is normal caliber.  No mediastinal or hilar lymph node enlargement.  No pericardial effusion.                               X-Ray Chest AP Portable (Final result)  Result time 12/21/20 07:46:40    Final result by Jeff St DO (12/21/20 07:46:40)                 Impression:      No acute cardiopulmonary abnormality.      Electronically signed by: Jeff St  Date:    12/21/2020  Time:    07:46             Narrative:    EXAMINATION:  XR CHEST AP PORTABLE    CLINICAL HISTORY:  Chest Pain.    TECHNIQUE:  Single frontal view of the chest was performed.    COMPARISON:  None    FINDINGS:  The lungs are well expanded and clear. No focal opacities are seen. The pleural spaces are clear.  The cardiac silhouette is unremarkable.  The visualized osseous structures are unremarkable.

## 2020-12-22 NOTE — PROGRESS NOTES
"[facility]  Hospital Medicine  Progress Note    Patient Name: Marjorie Harkins  MRN: [id number]  Patient Class: OP- Observation   Admission Date: 12/21/2020  Length of Stay: 0 days  Attending Physician: Ángel Mora MD  Primary Care Provider: JAQUI Chatman Jr. (Inactive)        Subjective:     Principal Problem:Acute cholecystitis        HPI:  Marjorie Harkins is a 70 year old woman with medical history of hypertension, factor V Leiden deficiency with history of deep vein thrombosis with IVC filter placement and benign postional vertigo.  She presented to the [facility] ED for evaluation of acute chest discomfort. The patient reported that she awoke at approximately 3:00 am with epigastric pain.  She describes the pain as a "heaviness" across her chest, but centered in epigastric region.  She reported radiation of pain to right flank and noted one episode of nausea and dry heaving.  She states she took an two aspirin without relief.  She denies shortness of breath or diaphoresis.   On arrival to ED, patient was found to be severely hypertensive, 221/97.  Electrocardiogram showed no evidence of acute ischemia and initial troponin negative.  Given history of factor V Leiden deficiency, CTA chest was completed and showed no evidence of acute pulmonary embolism.  The patient's hypertension was treated with her home dose of losartan 50 mg and one dose of intravenous morphine with improvement to patient's blood pressure and resolution of chest pain.  She will be admitted for observation and to rule out cardiac source of chest pain.      Overview/Hospital Course:  70 year old female admitted to observation with complaint of chest pain/epigastric pain with noted severe hypertension.  Troponins negative x3, ECG no evidence of acute ischemia ACS ruled out.  Given history of factor 5 Leiden mutation CTA of chest was done that showed No evidence for acute pulmonary thromboembolism through the segmental " pulmonary arteries.  Patient continue to have right upper quadrant pain, ultrasound done with gallbladder demonstrating innumerable shadowing stones and wall thickening with positive sonographic Blake sign, concern for acute cholecystitis.  General surgery was consulted, patient underwent laparoscopic cholecystectomy 12/22/2020, drain was placed.     Interval History:  Seen early a.m. with noted right upper quadrant tenderness to palpation; patient also seen status post lap juancho, drain with bloody/serosanguineous fluid    Review of Systems   Constitutional: Negative for chills and fever.   HENT: Negative for congestion.    Respiratory: Negative for cough, chest tightness and shortness of breath.    Cardiovascular: Negative for chest pain, palpitations and leg swelling.   Gastrointestinal: Positive for abdominal pain. Negative for abdominal distention, diarrhea, nausea and vomiting.   Genitourinary: Negative for difficulty urinating, dysuria, frequency, hematuria and urgency.   Musculoskeletal: Negative for arthralgias and myalgias.   Neurological: Negative for dizziness, syncope, light-headedness, numbness and headaches.   Psychiatric/Behavioral: Negative for confusion.     Objective:     Vital Signs (Most Recent):  Temp: 97.3 °F (36.3 °C) (12/22/20 1430)  Pulse: 82 (12/22/20 1600)  Resp: 20 (12/22/20 1701)  BP: (!) 158/91 (12/22/20 1445)  SpO2: 97 % (12/22/20 1445) Vital Signs (24h Range):  Temp:  [97.3 °F (36.3 °C)-98.9 °F (37.2 °C)] 97.3 °F (36.3 °C)  Pulse:  [] 82  Resp:  [16-20] 20  SpO2:  [94 %-100 %] 97 %  BP: (138-181)/(73-93) 158/91     Weight: 79.3 kg (174 lb 13.2 oz)  Body mass index is 28.22 kg/m².    Intake/Output Summary (Last 24 hours) at 12/22/2020 1735  Last data filed at 12/22/2020 1000  Gross per 24 hour   Intake 10 ml   Output 675 ml   Net -665 ml      Physical Exam  Vitals signs and nursing note reviewed.   Constitutional:       General: She is not in acute distress.     Appearance: She  is well-developed. She is not diaphoretic.   HENT:      Head: Normocephalic and atraumatic.   Eyes:      General: No scleral icterus.     Conjunctiva/sclera: Conjunctivae normal.   Neck:      Musculoskeletal: Normal range of motion and neck supple.   Cardiovascular:      Rate and Rhythm: Normal rate and regular rhythm.      Heart sounds: Normal heart sounds.   Pulmonary:      Effort: Pulmonary effort is normal. No respiratory distress.      Breath sounds: Normal breath sounds. No wheezing.   Abdominal:      General: Bowel sounds are normal. There is no distension.      Palpations: Abdomen is soft.      Tenderness: There is abdominal tenderness (At incision site).   Musculoskeletal: Normal range of motion.   Skin:     General: Skin is warm and dry.      Capillary Refill: Capillary refill takes less than 2 seconds.   Neurological:      Mental Status: She is alert and oriented to person, place, and time.         Significant Labs:   CBC:   Recent Labs   Lab 12/21/20  0730 12/22/20  0445   WBC 7.30 6.67   HGB 14.2 13.1   HCT 44.7 39.5    150     CMP:   Recent Labs   Lab 12/21/20  0730 12/22/20  0445    141   K 3.7 3.8    107   CO2 25 25   * 110   BUN 9 9   CREATININE 0.8 0.7   CALCIUM 8.7 8.4*   PROT 6.9 5.7*   ALBUMIN 4.2 3.3*   BILITOT 0.4 0.7   ALKPHOS 107 118   AST 20 77*   ALT 21 104*   ANIONGAP 12 9   EGFRNONAA >60 >60     Troponin:   Recent Labs   Lab 12/21/20  1022 12/21/20  1232 12/21/20  1822   TROPONINI 0.008 <0.006 0.009     All pertinent labs within the past 24 hours have been reviewed.    Significant Imaging: I have reviewed all pertinent imaging results/findings within the past 24 hours.   Imaging Results          CTA Chest Non-Coronary (PE Study) (Final result)  Result time 12/21/20 09:58:58    Final result by Monika Milton MD (12/21/20 09:58:58)                 Impression:      No evidence for acute pulmonary thromboembolism through the segmental pulmonary  arteries.    Bibasilar atelectasis.  Otherwise, clear lungs.      Electronically signed by: Monika Mliton  Date:    12/21/2020  Time:    09:58             Narrative:    EXAMINATION:  CTA CHEST NON CORONARY    CLINICAL HISTORY:  PE suspected, intermediate prob, positive D-dimer;    TECHNIQUE:  Technique: Contiguous 1.25-mm axial images were obtained from the thoracic inlet to the level upper abdomen following the administration of 75 cc of intravenous Omnipaque 350 Sagittal and coronal reformats were submitted.    COMPARISON:  None    FINDINGS:  No intraluminal filling defect in the trunk, main, lobar or segmental pulmonary arteries to suggest acute pulmonary embolus.    Trachea and proximal airways are patent.  The lungs are expanded. No acute consolidation, pleural effusion, or pneumothorax seen.  Dependent atelectasis.    The aorta is normal caliber.  No mediastinal or hilar lymph node enlargement.  No pericardial effusion.                               X-Ray Chest AP Portable (Final result)  Result time 12/21/20 07:46:40    Final result by Jeff St DO (12/21/20 07:46:40)                 Impression:      No acute cardiopulmonary abnormality.      Electronically signed by: Jeff St  Date:    12/21/2020  Time:    07:46             Narrative:    EXAMINATION:  XR CHEST AP PORTABLE    CLINICAL HISTORY:  Chest Pain.    TECHNIQUE:  Single frontal view of the chest was performed.    COMPARISON:  None    FINDINGS:  The lungs are well expanded and clear. No focal opacities are seen. The pleural spaces are clear.  The cardiac silhouette is unremarkable.  The visualized osseous structures are unremarkable.                                    Assessment/Plan:      * Acute cholecystitis  - right upper quadrant pain with with ultrasound showing gallbladder with an numerable shadowing stones or wall thickening with positive sonographic Blake sign  - general surgery consulted  - status post lap juancho 12/22/2020  -  pain management  - surgery following        Epigastric pain  Chest Pain, Rule Out Acute Myocardial Infarction  - EKG without  any acute ST/T wave changes  - Troponin trended negative  - Known cardiac risk factors: factor V Leiden mutation, family history, uncontrolled hypertension, most recent lipid panel 10/2020 with elevated total cholesterol and increased LDL   - Will recommend follow up with Cardiology or PCP for possible cardiac testing for risk stratification  - see acute cholecystitis           Hypertensive urgency  - Severe hypertension on admission  - Patient started on her home dose of losartan 50 mg daily, with improvement; suspect pain contributing   - PRN hydralazine for SBP > 170  - monitor      Deep venous thrombosis of leg  - Known Factor V Leiden mutation  - S/p IVC filter placement and not on anticoagulation  - discussed with surgery will start heparin DVT prophylactic dose  - consult heme/Onc for recommendations regarding AC      VTE Risk Mitigation (From admission, onward)         Ordered     heparin (porcine) injection 5,000 Units  Every 8 hours      12/22/20 1503     Place sequential compression device  Until discontinued      12/22/20 1511     Place EROS hose  Until discontinued      12/21/20 1214     IP VTE HIGH RISK PATIENT  Once      12/21/20 1214                Discharge Planning   JOHN: 12/23/2020     Code Status: Full Code   Is the patient medically ready for discharge?:     Reason for patient still in hospital (select all that apply): Treatment  Discharge Plan A: Home                  Luz Maria Hodges PA-C  Department of Hospital Medicine   [facility]

## 2020-12-22 NOTE — HOSPITAL COURSE
70 year old female admitted to observation with complaint of chest pain/epigastric pain with noted severe hypertension.  Troponins negative x3, ECG no evidence of acute ischemia ACS ruled out.  Given history of factor 5 Leiden mutation CTA of chest was done that showed No evidence for acute pulmonary thromboembolism through the segmental pulmonary arteries.  Patient continue to have right upper quadrant pain, ultrasound done with gallbladder demonstrating innumerable shadowing stones and wall thickening with positive sonographic Blake sign, concern for acute cholecystitis.  General surgery was consulted, patient underwent laparoscopic cholecystectomy 12/22/2020, drain was placed.  Patient continued with pain underwent MRCP that showed no significant biliary ductal dilatation or filling defect.  Heme Onc was consulted for recommendations of anticoagulation in the setting of factor 5 Leiden mutation.  Recommendation for prophylactic anticoagulation for 4 weeks post surgery.  Discussed with surgeon, hematologist, patient and friend regarding risk/benefits of anticoagulation.  Surgery requested to hold on oral anticoagulation 5 days postoperatively due to high risk of bleed, can continue Lovenox daily prophylactically.  Decision was for Lovenox for the next 3 days followed by Xarelto 10 mg daily for 4 weeks.  Patient is to follow-up with surgery in 2 weeks and Hematology.  Plan of care was discussed, all questions answered; patient and friend acknowledged and understand, and are agreeable with plan.  Stable for discharge home.

## 2020-12-22 NOTE — ASSESSMENT & PLAN NOTE
- Known Factor V Leiden mutation  - S/p IVC filter placement and not on anticoagulation  - discussed with surgery will start heparin DVT prophylactic dose  - consult heme/Onc for recommendations regarding AC

## 2020-12-22 NOTE — ASSESSMENT & PLAN NOTE
- right upper quadrant pain with with ultrasound showing gallbladder with an numerable shadowing stones or wall thickening with positive sonographic Blake sign  - general surgery consulted  - status post lap juancho 12/22/2020  - pain management  - surgery following

## 2020-12-23 LAB
ALBUMIN SERPL BCP-MCNC: 3.9 G/DL (ref 3.5–5.2)
ALP SERPL-CCNC: 165 U/L (ref 55–135)
ALT SERPL W/O P-5'-P-CCNC: 237 U/L (ref 10–44)
ANION GAP SERPL CALC-SCNC: 12 MMOL/L (ref 8–16)
ANION GAP SERPL CALC-SCNC: 12 MMOL/L (ref 8–16)
AST SERPL-CCNC: 220 U/L (ref 10–40)
BASOPHILS # BLD AUTO: 0.01 K/UL (ref 0–0.2)
BASOPHILS NFR BLD: 0.1 % (ref 0–1.9)
BILIRUB SERPL-MCNC: 1.3 MG/DL (ref 0.1–1)
BUN SERPL-MCNC: 9 MG/DL (ref 8–23)
BUN SERPL-MCNC: 9 MG/DL (ref 8–23)
CALCIUM SERPL-MCNC: 9 MG/DL (ref 8.7–10.5)
CALCIUM SERPL-MCNC: 9 MG/DL (ref 8.7–10.5)
CHLORIDE SERPL-SCNC: 100 MMOL/L (ref 95–110)
CHLORIDE SERPL-SCNC: 100 MMOL/L (ref 95–110)
CO2 SERPL-SCNC: 25 MMOL/L (ref 23–29)
CO2 SERPL-SCNC: 25 MMOL/L (ref 23–29)
CREAT SERPL-MCNC: 0.7 MG/DL (ref 0.5–1.4)
CREAT SERPL-MCNC: 0.7 MG/DL (ref 0.5–1.4)
DIFFERENTIAL METHOD: ABNORMAL
EOSINOPHIL # BLD AUTO: 0 K/UL (ref 0–0.5)
EOSINOPHIL NFR BLD: 0.1 % (ref 0–8)
ERYTHROCYTE [DISTWIDTH] IN BLOOD BY AUTOMATED COUNT: 12.9 % (ref 11.5–14.5)
EST. GFR  (AFRICAN AMERICAN): >60 ML/MIN/1.73 M^2
EST. GFR  (AFRICAN AMERICAN): >60 ML/MIN/1.73 M^2
EST. GFR  (NON AFRICAN AMERICAN): >60 ML/MIN/1.73 M^2
EST. GFR  (NON AFRICAN AMERICAN): >60 ML/MIN/1.73 M^2
GLUCOSE SERPL-MCNC: 125 MG/DL (ref 70–110)
GLUCOSE SERPL-MCNC: 125 MG/DL (ref 70–110)
HCT VFR BLD AUTO: 46.4 % (ref 37–48.5)
HGB BLD-MCNC: 15 G/DL (ref 12–16)
IMM GRANULOCYTES # BLD AUTO: 0.05 K/UL (ref 0–0.04)
IMM GRANULOCYTES NFR BLD AUTO: 0.5 % (ref 0–0.5)
LYMPHOCYTES # BLD AUTO: 0.7 K/UL (ref 1–4.8)
LYMPHOCYTES NFR BLD: 7.2 % (ref 18–48)
MCH RBC QN AUTO: 29.4 PG (ref 27–31)
MCHC RBC AUTO-ENTMCNC: 32.3 G/DL (ref 32–36)
MCV RBC AUTO: 91 FL (ref 82–98)
MONOCYTES # BLD AUTO: 0.6 K/UL (ref 0.3–1)
MONOCYTES NFR BLD: 6.2 % (ref 4–15)
NEUTROPHILS # BLD AUTO: 8.8 K/UL (ref 1.8–7.7)
NEUTROPHILS NFR BLD: 85.9 % (ref 38–73)
NRBC BLD-RTO: 0 /100 WBC
PLATELET # BLD AUTO: 170 K/UL (ref 150–350)
PMV BLD AUTO: 11.4 FL (ref 9.2–12.9)
POTASSIUM SERPL-SCNC: 4.1 MMOL/L (ref 3.5–5.1)
POTASSIUM SERPL-SCNC: 4.1 MMOL/L (ref 3.5–5.1)
PROT SERPL-MCNC: 7.1 G/DL (ref 6–8.4)
RBC # BLD AUTO: 5.1 M/UL (ref 4–5.4)
SODIUM SERPL-SCNC: 137 MMOL/L (ref 136–145)
SODIUM SERPL-SCNC: 137 MMOL/L (ref 136–145)
WBC # BLD AUTO: 10.21 K/UL (ref 3.9–12.7)

## 2020-12-23 PROCEDURE — A4216 STERILE WATER/SALINE, 10 ML: HCPCS | Performed by: SPECIALIST

## 2020-12-23 PROCEDURE — 80053 COMPREHEN METABOLIC PANEL: CPT

## 2020-12-23 PROCEDURE — 99226 PR SUBSEQUENT OBSERVATION CARE,LEVEL III: ICD-10-PCS | Mod: ,,, | Performed by: PHYSICIAN ASSISTANT

## 2020-12-23 PROCEDURE — G0378 HOSPITAL OBSERVATION PER HR: HCPCS

## 2020-12-23 PROCEDURE — 25000003 PHARM REV CODE 250: Performed by: SPECIALIST

## 2020-12-23 PROCEDURE — 63600175 PHARM REV CODE 636 W HCPCS: Performed by: PHYSICIAN ASSISTANT

## 2020-12-23 PROCEDURE — 36410 VNPNXR 3YR/> PHY/QHP DX/THER: CPT

## 2020-12-23 PROCEDURE — 96376 TX/PRO/DX INJ SAME DRUG ADON: CPT

## 2020-12-23 PROCEDURE — 85025 COMPLETE CBC W/AUTO DIFF WBC: CPT

## 2020-12-23 PROCEDURE — 25000003 PHARM REV CODE 250: Performed by: PHYSICIAN ASSISTANT

## 2020-12-23 PROCEDURE — 96375 TX/PRO/DX INJ NEW DRUG ADDON: CPT

## 2020-12-23 PROCEDURE — 36415 COLL VENOUS BLD VENIPUNCTURE: CPT

## 2020-12-23 PROCEDURE — 63600175 PHARM REV CODE 636 W HCPCS: Performed by: SPECIALIST

## 2020-12-23 PROCEDURE — 96372 THER/PROPH/DIAG INJ SC/IM: CPT

## 2020-12-23 PROCEDURE — 99226 PR SUBSEQUENT OBSERVATION CARE,LEVEL III: CPT | Mod: ,,, | Performed by: PHYSICIAN ASSISTANT

## 2020-12-23 RX ORDER — POLYETHYLENE GLYCOL 3350 17 G/17G
17 POWDER, FOR SOLUTION ORAL DAILY
Status: DISCONTINUED | OUTPATIENT
Start: 2020-12-23 | End: 2020-12-24 | Stop reason: HOSPADM

## 2020-12-23 RX ORDER — AMOXICILLIN 250 MG
1 CAPSULE ORAL 2 TIMES DAILY PRN
Status: DISCONTINUED | OUTPATIENT
Start: 2020-12-23 | End: 2020-12-24 | Stop reason: HOSPADM

## 2020-12-23 RX ORDER — OXYCODONE AND ACETAMINOPHEN 10; 325 MG/1; MG/1
1 TABLET ORAL EVERY 4 HOURS PRN
Status: DISCONTINUED | OUTPATIENT
Start: 2020-12-23 | End: 2020-12-23

## 2020-12-23 RX ORDER — OXYCODONE AND ACETAMINOPHEN 10; 325 MG/1; MG/1
1 TABLET ORAL EVERY 4 HOURS PRN
Status: DISCONTINUED | OUTPATIENT
Start: 2020-12-23 | End: 2020-12-24 | Stop reason: HOSPADM

## 2020-12-23 RX ADMIN — OXYCODONE AND ACETAMINOPHEN 1 TABLET: 7.5; 325 TABLET ORAL at 10:12

## 2020-12-23 RX ADMIN — CLONAZEPAM 0.5 MG: 0.5 TABLET ORAL at 09:12

## 2020-12-23 RX ADMIN — PIPERACILLIN AND TAZOBACTAM 4.5 G: 4; .5 INJECTION, POWDER, LYOPHILIZED, FOR SOLUTION INTRAVENOUS; PARENTERAL at 01:12

## 2020-12-23 RX ADMIN — HEPARIN SODIUM 5000 UNITS: 5000 INJECTION INTRAVENOUS; SUBCUTANEOUS at 03:12

## 2020-12-23 RX ADMIN — ACETAMINOPHEN 650 MG: 325 TABLET, FILM COATED ORAL at 05:12

## 2020-12-23 RX ADMIN — Medication 3 ML: at 11:12

## 2020-12-23 RX ADMIN — HEPARIN SODIUM 5000 UNITS: 5000 INJECTION INTRAVENOUS; SUBCUTANEOUS at 10:12

## 2020-12-23 RX ADMIN — POLYETHYLENE GLYCOL 3350 17 G: 17 POWDER, FOR SOLUTION ORAL at 03:12

## 2020-12-23 RX ADMIN — OXYCODONE AND ACETAMINOPHEN 1 TABLET: 7.5; 325 TABLET ORAL at 06:12

## 2020-12-23 RX ADMIN — OXYCODONE AND ACETAMINOPHEN 1 TABLET: 7.5; 325 TABLET ORAL at 12:12

## 2020-12-23 RX ADMIN — Medication 3 ML: at 07:12

## 2020-12-23 RX ADMIN — LOSARTAN POTASSIUM 50 MG: 50 TABLET, FILM COATED ORAL at 03:12

## 2020-12-23 RX ADMIN — ONDANSETRON 8 MG: 8 TABLET, ORALLY DISINTEGRATING ORAL at 06:12

## 2020-12-23 RX ADMIN — DOCUSATE SODIUM 100 MG: 100 CAPSULE, LIQUID FILLED ORAL at 03:12

## 2020-12-23 RX ADMIN — OXYCODONE AND ACETAMINOPHEN 1 TABLET: 7.5; 325 TABLET ORAL at 04:12

## 2020-12-23 RX ADMIN — Medication 3 ML: at 05:12

## 2020-12-23 RX ADMIN — PIPERACILLIN AND TAZOBACTAM 4.5 G: 4; .5 INJECTION, POWDER, LYOPHILIZED, FOR SOLUTION INTRAVENOUS; PARENTERAL at 07:12

## 2020-12-23 RX ADMIN — HEPARIN SODIUM 5000 UNITS: 5000 INJECTION INTRAVENOUS; SUBCUTANEOUS at 05:12

## 2020-12-23 RX ADMIN — OXYCODONE HYDROCHLORIDE AND ACETAMINOPHEN 1 TABLET: 10; 325 TABLET ORAL at 08:12

## 2020-12-23 RX ADMIN — OXYCODONE HYDROCHLORIDE AND ACETAMINOPHEN 1 TABLET: 10; 325 TABLET ORAL at 11:12

## 2020-12-23 NOTE — ASSESSMENT & PLAN NOTE
- Known Factor V Leiden mutation  - S/p IVC filter placement and not on anticoagulation  - discussed with surgery heparin DVT prophylactic dose  - appreciate heme recs Continue prophylactic anticoagulation (xarelto 10mg or eliquis 2.5 BID)  for 4 weeks post surgery.   - discussed with patient

## 2020-12-23 NOTE — SUBJECTIVE & OBJECTIVE
Oncology Treatment Plan:   [No treatment plan]  Patient was seen and examined at bedside.  Family friend at bedside  Patient complains of mild pain post surgery.    Medications:  Continuous Infusions:  Scheduled Meds:   clonazePAM  0.5 mg Oral QHS    [START ON 12/23/2020] docusate sodium  100 mg Oral Daily    heparin (porcine)  5,000 Units Subcutaneous Q8H    losartan  50 mg Oral Daily    pantoprazole  40 mg Oral Daily    piperacillin-tazobactam (ZOSYN) IVPB  4.5 g Intravenous Q8H    sodium chloride 0.9%  3 mL Intravenous Q8H     PRN Meds:acetaminophen, hydrALAZINE, HYDROmorphone, melatonin, ondansetron, oxyCODONE-acetaminophen, promethazine (PHENERGAN) IVPB, sodium chloride 0.9%     Review of patient's allergies indicates:  No Known Allergies     Past Medical History:   Diagnosis Date    Deep vein thrombosis (DVT) 2002    Factor V deficiency     Hypertension      Past Surgical History:   Procedure Laterality Date    APPENDECTOMY      HYSTERECTOMY      TONSILLECTOMY       Family History     None        Tobacco Use    Smoking status: Never Smoker    Smokeless tobacco: Never Used   Substance and Sexual Activity    Alcohol use: Yes     Alcohol/week: 4.0 standard drinks     Types: 4 Glasses of wine per week    Drug use: No    Sexual activity: Not on file       Review of Systems   Constitutional: Positive for fatigue. Negative for fever and unexpected weight change.   HENT: Negative for postnasal drip and trouble swallowing.    Eyes: Negative for pain and visual disturbance.   Respiratory: Negative for cough and shortness of breath.    Cardiovascular: Negative for chest pain and leg swelling.   Gastrointestinal: Positive for abdominal pain. Negative for constipation and diarrhea.   Genitourinary: Negative for difficulty urinating and pelvic pain.   Musculoskeletal: Negative.  Negative for back pain and gait problem.   Neurological: Negative for dizziness and headaches.   Psychiatric/Behavioral: Negative  for behavioral problems. The patient is not nervous/anxious.      Objective:     Vital Signs (Most Recent):  Temp: 97.8 °F (36.6 °C) (12/22/20 1701)  Pulse: 83 (12/22/20 1900)  Resp: 20 (12/22/20 1701)  BP: (!) 158/91 (12/22/20 1445)  SpO2: 97 % (12/22/20 1445) Vital Signs (24h Range):  Temp:  [97.3 °F (36.3 °C)-98.9 °F (37.2 °C)] 97.8 °F (36.6 °C)  Pulse:  [] 83  Resp:  [16-20] 20  SpO2:  [94 %-100 %] 97 %  BP: (138-164)/(73-91) 158/91     Weight: 79.3 kg (174 lb 13.2 oz)  Body mass index is 28.22 kg/m².  Body surface area is 1.92 meters squared.      Intake/Output Summary (Last 24 hours) at 12/22/2020 1909  Last data filed at 12/22/2020 1744  Gross per 24 hour   Intake 350 ml   Output 2175 ml   Net -1825 ml       Physical Exam  Vitals signs and nursing note reviewed.   Constitutional:       General: She is not in acute distress.     Appearance: Normal appearance. She is well-developed.   HENT:      Head: Normocephalic and atraumatic.      Right Ear: External ear normal.      Left Ear: External ear normal.      Mouth/Throat:      Pharynx: No oropharyngeal exudate.   Eyes:      General: No scleral icterus.     Conjunctiva/sclera: Conjunctivae normal.   Neck:      Musculoskeletal: Normal range of motion and neck supple.      Thyroid: No thyromegaly.      Trachea: No tracheal deviation.   Cardiovascular:      Rate and Rhythm: Normal rate and regular rhythm.      Heart sounds: Normal heart sounds. No murmur.   Pulmonary:      Effort: Pulmonary effort is normal. No respiratory distress.      Breath sounds: Normal breath sounds. No wheezing or rales.   Abdominal:      General: Bowel sounds are normal. There is no distension.      Palpations: Abdomen is soft. There is no mass.      Tenderness: There is abdominal tenderness. There is no rebound.      Comments: Drain in place   Musculoskeletal: Normal range of motion.         General: No tenderness.   Lymphadenopathy:      Cervical: No cervical adenopathy.   Skin:      General: Skin is warm.      Findings: No erythema or rash.   Neurological:      Mental Status: She is alert and oriented to person, place, and time.      Cranial Nerves: No cranial nerve deficit.   Psychiatric:         Behavior: Behavior normal.         Significant Labs:   CBC:   Recent Labs   Lab 12/21/20  0730 12/22/20 0445   WBC 7.30 6.67   HGB 14.2 13.1   HCT 44.7 39.5    150   , CMP:   Recent Labs   Lab 12/21/20  0730 12/22/20 0445    141   K 3.7 3.8    107   CO2 25 25   * 110   BUN 9 9   CREATININE 0.8 0.7   CALCIUM 8.7 8.4*   PROT 6.9 5.7*   ALBUMIN 4.2 3.3*   BILITOT 0.4 0.7   ALKPHOS 107 118   AST 20 77*   ALT 21 104*   ANIONGAP 12 9   EGFRNONAA >60 >60   , LDH: No results for input(s): LDHCSF, BFSOURCE in the last 48 hours., LFTs:   Recent Labs   Lab 12/21/20  0730 12/22/20 0445   ALT 21 104*   AST 20 77*   ALKPHOS 107 118   BILITOT 0.4 0.7   PROT 6.9 5.7*   ALBUMIN 4.2 3.3*    and All pertinent labs from the last 24 hours have been reviewed.    Diagnostic Results:  CT: reviewed  U/S: Reviewed  I have reviewed all pertinent imaging results/findings within the past 24 hours.

## 2020-12-23 NOTE — ASSESSMENT & PLAN NOTE
Thirty years back, clot in groin vein  IVC filter was placed due to being perioperative  No anticoagulation was given

## 2020-12-23 NOTE — SUBJECTIVE & OBJECTIVE
Interval History:  Patient continues to complain of pain, MRCP no ductal dilatation; discussed with patient and surgeon will get HIDA scan to rule out biliary leak;    Review of Systems   Constitutional: Negative for chills and fever.   HENT: Negative for congestion.    Respiratory: Negative for cough, chest tightness and shortness of breath.    Cardiovascular: Negative for chest pain, palpitations and leg swelling.   Gastrointestinal: Positive for abdominal pain. Negative for abdominal distention, diarrhea, nausea and vomiting.   Genitourinary: Negative for difficulty urinating, dysuria, frequency, hematuria and urgency.   Musculoskeletal: Negative for arthralgias and myalgias.   Neurological: Negative for dizziness, syncope, light-headedness, numbness and headaches.   Psychiatric/Behavioral: Negative for confusion.     Objective:     Vital Signs (Most Recent):  Temp: 98 °F (36.7 °C) (12/23/20 1204)  Pulse: (pt remains off tele. in MRI) (12/23/20 1400)  Resp: 18 (12/23/20 1204)  BP: 139/84 (12/23/20 1204)  SpO2: 97 % (12/23/20 1204) Vital Signs (24h Range):  Temp:  [97.6 °F (36.4 °C)-98.1 °F (36.7 °C)] 98 °F (36.7 °C)  Pulse:  [58-88] 71  Resp:  [16-20] 18  SpO2:  [93 %-98 %] 97 %  BP: (132-143)/(76-84) 139/84     Weight: 79.8 kg (176 lb)  Body mass index is 28.41 kg/m².    Intake/Output Summary (Last 24 hours) at 12/23/2020 1603  Last data filed at 12/23/2020 1100  Gross per 24 hour   Intake 700 ml   Output 2747.5 ml   Net -2047.5 ml      Physical Exam  Vitals signs and nursing note reviewed.   Constitutional:       General: She is not in acute distress.     Appearance: She is well-developed. She is not diaphoretic.   HENT:      Head: Normocephalic and atraumatic.   Eyes:      General: No scleral icterus.     Conjunctiva/sclera: Conjunctivae normal.   Neck:      Musculoskeletal: Normal range of motion and neck supple.   Cardiovascular:      Rate and Rhythm: Normal rate and regular rhythm.      Heart sounds: Normal  heart sounds.   Pulmonary:      Effort: Pulmonary effort is normal. No respiratory distress.      Breath sounds: Normal breath sounds. No wheezing.   Abdominal:      General: Bowel sounds are normal. There is no distension.      Palpations: Abdomen is soft.      Tenderness: There is abdominal tenderness (At incision site).   Musculoskeletal: Normal range of motion.   Skin:     General: Skin is warm and dry.      Capillary Refill: Capillary refill takes less than 2 seconds.   Neurological:      Mental Status: She is alert and oriented to person, place, and time.         Significant Labs:   CBC:   Recent Labs   Lab 12/22/20 0445 12/23/20 0451   WBC 6.67 10.21   HGB 13.1 15.0   HCT 39.5 46.4    170     CMP:   Recent Labs   Lab 12/22/20 0445 12/23/20 0451    137  137   K 3.8 4.1  4.1    100  100   CO2 25 25  25    125*  125*   BUN 9 9  9   CREATININE 0.7 0.7  0.7   CALCIUM 8.4* 9.0  9.0   PROT 5.7* 7.1   ALBUMIN 3.3* 3.9   BILITOT 0.7 1.3*   ALKPHOS 118 165*   AST 77* 220*   * 237*   ANIONGAP 9 12  12   EGFRNONAA >60 >60  >60     All pertinent labs within the past 24 hours have been reviewed.    Significant Imaging: I have reviewed all pertinent imaging results/findings within the past 24 hours.   Imaging Results          CTA Chest Non-Coronary (PE Study) (Final result)  Result time 12/21/20 09:58:58    Final result by Monika Milton MD (12/21/20 09:58:58)                 Impression:      No evidence for acute pulmonary thromboembolism through the segmental pulmonary arteries.    Bibasilar atelectasis.  Otherwise, clear lungs.      Electronically signed by: Monika Milton  Date:    12/21/2020  Time:    09:58             Narrative:    EXAMINATION:  CTA CHEST NON CORONARY    CLINICAL HISTORY:  PE suspected, intermediate prob, positive D-dimer;    TECHNIQUE:  Technique: Contiguous 1.25-mm axial images were obtained from the thoracic inlet to the level upper abdomen  following the administration of 75 cc of intravenous Omnipaque 350 Sagittal and coronal reformats were submitted.    COMPARISON:  None    FINDINGS:  No intraluminal filling defect in the trunk, main, lobar or segmental pulmonary arteries to suggest acute pulmonary embolus.    Trachea and proximal airways are patent.  The lungs are expanded. No acute consolidation, pleural effusion, or pneumothorax seen.  Dependent atelectasis.    The aorta is normal caliber.  No mediastinal or hilar lymph node enlargement.  No pericardial effusion.                               X-Ray Chest AP Portable (Final result)  Result time 12/21/20 07:46:40    Final result by Jeff St DO (12/21/20 07:46:40)                 Impression:      No acute cardiopulmonary abnormality.      Electronically signed by: Jeff St  Date:    12/21/2020  Time:    07:46             Narrative:    EXAMINATION:  XR CHEST AP PORTABLE    CLINICAL HISTORY:  Chest Pain.    TECHNIQUE:  Single frontal view of the chest was performed.    COMPARISON:  None    FINDINGS:  The lungs are well expanded and clear. No focal opacities are seen. The pleural spaces are clear.  The cardiac silhouette is unremarkable.  The visualized osseous structures are unremarkable.

## 2020-12-23 NOTE — PLAN OF CARE
VSS and afebrile, aaox4. Lap sites CDI, marline x 1 putting out bloody drainage. Pain controlled with PRN po medication. Up to bathroom with assist. Tolerating regular diet. Plan of care reviewed with patient. Purposeful hourly rounding complete. Call light at bedside, bed at lowest position, brakes on, nonskid socks on. Will continue to monitor.

## 2020-12-23 NOTE — PROGRESS NOTES
avss  C/o abd pain  PE  Anicteric  Abd--soft, incisional tenderness    Lab  Bile--1.3  Alk [id number]    Plan  MRCP

## 2020-12-23 NOTE — ASSESSMENT & PLAN NOTE
Patient states that she was checked about 30 years back and has multiple siblings and father with factor 5 Leiden  She does not know if she is a carrier or has the mutation  Will recheck patient for factor 5 and other thrombophilias  Optimal duration of prophylaxis is unknown, patient is high risk with known thrombophilia, personal and Fhx of VTE , Continue prophylactic anticoagulation (xarelto 10mg or eliquis 2.5 BID)  for 4 weeks post surgery.   Long-term anticoagulation was briefly discussed with patient, pt will think about it, pending thrombophilia results, can be followed outpatient

## 2020-12-23 NOTE — PROGRESS NOTES
"[facility]  Hospital Medicine  Progress Note    Patient Name: Marjorie Harkins  MRN: [id number]  Patient Class: OP- Observation   Admission Date: 12/21/2020  Length of Stay: 0 days  Attending Physician: Ángel Mora MD  Primary Care Provider: JAQUI Chatman Jr. (Inactive)        Subjective:     Principal Problem:Acute cholecystitis        HPI:  Marjorie Harkins is a 70 year old woman with medical history of hypertension, factor V Leiden deficiency with history of deep vein thrombosis with IVC filter placement and benign postional vertigo.  She presented to the [facility] ED for evaluation of acute chest discomfort. The patient reported that she awoke at approximately 3:00 am with epigastric pain.  She describes the pain as a "heaviness" across her chest, but centered in epigastric region.  She reported radiation of pain to right flank and noted one episode of nausea and dry heaving.  She states she took an two aspirin without relief.  She denies shortness of breath or diaphoresis.   On arrival to ED, patient was found to be severely hypertensive, 221/97.  Electrocardiogram showed no evidence of acute ischemia and initial troponin negative.  Given history of factor V Leiden deficiency, CTA chest was completed and showed no evidence of acute pulmonary embolism.  The patient's hypertension was treated with her home dose of losartan 50 mg and one dose of intravenous morphine with improvement to patient's blood pressure and resolution of chest pain.  She will be admitted for observation and to rule out cardiac source of chest pain.      Overview/Hospital Course:  70 year old female admitted to observation with complaint of chest pain/epigastric pain with noted severe hypertension.  Troponins negative x3, ECG no evidence of acute ischemia ACS ruled out.  Given history of factor 5 Leiden mutation CTA of chest was done that showed No evidence for acute pulmonary thromboembolism through the segmental " pulmonary arteries.  Patient continue to have right upper quadrant pain, ultrasound done with gallbladder demonstrating innumerable shadowing stones and wall thickening with positive sonographic Blake sign, concern for acute cholecystitis.  General surgery was consulted, patient underwent laparoscopic cholecystectomy 12/22/2020, drain was placed.  Patient continued with pain underwent MRCP that showed no significant biliary ductal dilatation or filling defect.    Interval History:  Patient continues to complain of pain, MRCP no ductal dilatation; discussed with patient and surgeon will get HIDA scan to rule out biliary leak;    Review of Systems   Constitutional: Negative for chills and fever.   HENT: Negative for congestion.    Respiratory: Negative for cough, chest tightness and shortness of breath.    Cardiovascular: Negative for chest pain, palpitations and leg swelling.   Gastrointestinal: Positive for abdominal pain. Negative for abdominal distention, diarrhea, nausea and vomiting.   Genitourinary: Negative for difficulty urinating, dysuria, frequency, hematuria and urgency.   Musculoskeletal: Negative for arthralgias and myalgias.   Neurological: Negative for dizziness, syncope, light-headedness, numbness and headaches.   Psychiatric/Behavioral: Negative for confusion.     Objective:     Vital Signs (Most Recent):  Temp: 98 °F (36.7 °C) (12/23/20 1204)  Pulse: (pt remains off tele. in MRI) (12/23/20 1400)  Resp: 18 (12/23/20 1204)  BP: 139/84 (12/23/20 1204)  SpO2: 97 % (12/23/20 1204) Vital Signs (24h Range):  Temp:  [97.6 °F (36.4 °C)-98.1 °F (36.7 °C)] 98 °F (36.7 °C)  Pulse:  [58-88] 71  Resp:  [16-20] 18  SpO2:  [93 %-98 %] 97 %  BP: (132-143)/(76-84) 139/84     Weight: 79.8 kg (176 lb)  Body mass index is 28.41 kg/m².    Intake/Output Summary (Last 24 hours) at 12/23/2020 1603  Last data filed at 12/23/2020 1100  Gross per 24 hour   Intake 700 ml   Output 2747.5 ml   Net -2047.5 ml      Physical  Exam  Vitals signs and nursing note reviewed.   Constitutional:       General: She is not in acute distress.     Appearance: She is well-developed. She is not diaphoretic.   HENT:      Head: Normocephalic and atraumatic.   Eyes:      General: No scleral icterus.     Conjunctiva/sclera: Conjunctivae normal.   Neck:      Musculoskeletal: Normal range of motion and neck supple.   Cardiovascular:      Rate and Rhythm: Normal rate and regular rhythm.      Heart sounds: Normal heart sounds.   Pulmonary:      Effort: Pulmonary effort is normal. No respiratory distress.      Breath sounds: Normal breath sounds. No wheezing.   Abdominal:      General: Bowel sounds are normal. There is no distension.      Palpations: Abdomen is soft.      Tenderness: There is abdominal tenderness (At incision site).   Musculoskeletal: Normal range of motion.   Skin:     General: Skin is warm and dry.      Capillary Refill: Capillary refill takes less than 2 seconds.   Neurological:      Mental Status: She is alert and oriented to person, place, and time.         Significant Labs:   CBC:   Recent Labs   Lab 12/22/20 0445 12/23/20  0451   WBC 6.67 10.21   HGB 13.1 15.0   HCT 39.5 46.4    170     CMP:   Recent Labs   Lab 12/22/20 0445 12/23/20  0451    137  137   K 3.8 4.1  4.1    100  100   CO2 25 25  25    125*  125*   BUN 9 9  9   CREATININE 0.7 0.7  0.7   CALCIUM 8.4* 9.0  9.0   PROT 5.7* 7.1   ALBUMIN 3.3* 3.9   BILITOT 0.7 1.3*   ALKPHOS 118 165*   AST 77* 220*   * 237*   ANIONGAP 9 12  12   EGFRNONAA >60 >60  >60     All pertinent labs within the past 24 hours have been reviewed.    Significant Imaging: I have reviewed all pertinent imaging results/findings within the past 24 hours.   Imaging Results          CTA Chest Non-Coronary (PE Study) (Final result)  Result time 12/21/20 09:58:58    Final result by Monika Milton MD (12/21/20 09:58:58)                 Impression:      No evidence  for acute pulmonary thromboembolism through the segmental pulmonary arteries.    Bibasilar atelectasis.  Otherwise, clear lungs.      Electronically signed by: Monika Milton  Date:    12/21/2020  Time:    09:58             Narrative:    EXAMINATION:  CTA CHEST NON CORONARY    CLINICAL HISTORY:  PE suspected, intermediate prob, positive D-dimer;    TECHNIQUE:  Technique: Contiguous 1.25-mm axial images were obtained from the thoracic inlet to the level upper abdomen following the administration of 75 cc of intravenous Omnipaque 350 Sagittal and coronal reformats were submitted.    COMPARISON:  None    FINDINGS:  No intraluminal filling defect in the trunk, main, lobar or segmental pulmonary arteries to suggest acute pulmonary embolus.    Trachea and proximal airways are patent.  The lungs are expanded. No acute consolidation, pleural effusion, or pneumothorax seen.  Dependent atelectasis.    The aorta is normal caliber.  No mediastinal or hilar lymph node enlargement.  No pericardial effusion.                               X-Ray Chest AP Portable (Final result)  Result time 12/21/20 07:46:40    Final result by Jeff St DO (12/21/20 07:46:40)                 Impression:      No acute cardiopulmonary abnormality.      Electronically signed by: Jeff St  Date:    12/21/2020  Time:    07:46             Narrative:    EXAMINATION:  XR CHEST AP PORTABLE    CLINICAL HISTORY:  Chest Pain.    TECHNIQUE:  Single frontal view of the chest was performed.    COMPARISON:  None    FINDINGS:  The lungs are well expanded and clear. No focal opacities are seen. The pleural spaces are clear.  The cardiac silhouette is unremarkable.  The visualized osseous structures are unremarkable.                                    Assessment/Plan:      * Acute cholecystitis  - right upper quadrant pain with with ultrasound showing gallbladder with an numerable shadowing stones or wall thickening with positive sonographic Blake  sign  - status post lap juancho 12/22/2020  - continue antibiotics  - pain management  - surgery following, discussed was surgery obtained MRCP for continued abdominal pain no evidence of biliary ductal dilatation, will obtain a HIDA scan to rule out biliary leak  - discussed with patient and friend at bedside all questions answered      Factor V Leiden mutation  History of thrombosis  - Known Factor V Leiden mutation  - S/p IVC filter placement and not on anticoagulation  - discussed with surgery heparin DVT prophylactic dose  - appreciate heme recs Continue prophylactic anticoagulation (xarelto 10mg or eliquis 2.5 BID)  for 4 weeks post surgery.   - discussed with patient      Epigastric pain  Chest Pain, Rule Out Acute Myocardial Infarction  - EKG without  any acute ST/T wave changes  - Troponin trended negative  - Known cardiac risk factors: factor V Leiden mutation, family history, uncontrolled hypertension, most recent lipid panel 10/2020 with elevated total cholesterol and increased LDL   - Will recommend follow up with Cardiology or PCP for possible cardiac testing for risk stratification  - see acute cholecystitis           Hypertensive urgency  - Severe hypertension on admission  - Patient started on her home dose of losartan 50 mg daily, with improvement; suspect pain contributing   - PRN hydralazine for SBP > 170  - monitor        VTE Risk Mitigation (From admission, onward)         Ordered     heparin (porcine) injection 5,000 Units  Every 8 hours      12/22/20 1503     Place sequential compression device  Until discontinued      12/22/20 1511     Place EROS hose  Until discontinued      12/21/20 1214     IP VTE HIGH RISK PATIENT  Once      12/21/20 1214                Discharge Planning   JOHN: 12/23/2020     Code Status: Full Code   Is the patient medically ready for discharge?:     Reason for patient still in hospital (select all that apply): Treatment  Discharge Plan A: Home                  Luz Maria HARDEN  ROBERT Hodges  Department of Hospital Medicine   [facility]

## 2020-12-23 NOTE — ASSESSMENT & PLAN NOTE
- right upper quadrant pain with with ultrasound showing gallbladder with an numerable shadowing stones or wall thickening with positive sonographic Blake sign  - status post lap juancho 12/22/2020  - continue antibiotics  - pain management  - surgery following, discussed was surgery obtained MRCP for continued abdominal pain no evidence of biliary ductal dilatation, will obtain a HIDA scan to rule out biliary leak  - discussed with patient and friend at bedside all questions answered

## 2020-12-23 NOTE — CONSULTS
[facility]  Hematology/Oncology  Consult Note    Patient Name: Marjorie Harkins  MRN: [id number]  Admission Date: 12/21/2020  Hospital Length of Stay: 0 days  Code Status: Full Code   Attending Provider: Ángel Mora MD  Consulting Provider: Melvina Hahn MD  Primary Care Physician: JAQUI Chatman Jr. (Inactive)  Principal Problem:Acute cholecystitis    Inpatient consult to Hematology/Oncology  Consult performed by: Melvina Hahn MD  Consult ordered by: Garry Marquez Jr., MD  Reason for consult: Factor 5 Leiden, recommendations for postop anticoagulation  Assessment/Recommendations: Prophylactic anticoagulation has been started by primary team  Patient can be transitioned to prophylactic anticoagulation with oral agents whenever deemed adequate by surgery.  Patient will think about full-dose anticoagulation and long-term anticoagulation, pending thrombophilia results.  Can be discussed outpatient.        Subjective:     HPI:  70 y.o. F with history of hypertension and factor 5 Leiden is admitted to the hospital with chest pain.  CTA in the ER was negative for any pulmonary embolism.  She was admitted for hypertensive in emergency.  Ultrasound abdomen was concerning for acute cholecystitis.  Subsequently patient underwent lap cholecystectomy.  Hematology was consulted as patient has history of factor 5 Leiden.    Patient stated that at the age of 41 or 42 she was dealing with endometriosis.  She had right leg swelling and Imaging for clot in the leg was negative, and finally when imaging was done higher up in the pelvic area blood clot was found.  Since she had to be taken for hysterectomy urgently blood thinners could not be given and [place] IVC filter was placed.  Patient did not receive any chronic anticoagulation.  She recalls, she was told at that time that IVC filter was an alternative to anticoagulation.  Other than that patient has not had any other blood clots in the legs or lungs  patient states that her father also had factor 5 Leiden and had blood clots and multiple strokes.  She is 1 of the 10 siblings, and multiple siblings suffer from factor 5 Leiden, have clots and on blood thinners.  She mentions that her sisters had clots during pregnancy.        Oncology Treatment Plan:   [No treatment plan]  Patient was seen and examined at bedside.  Family friend at bedside  Patient complains of mild pain post surgery.    Medications:  Continuous Infusions:  Scheduled Meds:   clonazePAM  0.5 mg Oral QHS    [START ON 12/23/2020] docusate sodium  100 mg Oral Daily    heparin (porcine)  5,000 Units Subcutaneous Q8H    losartan  50 mg Oral Daily    pantoprazole  40 mg Oral Daily    piperacillin-tazobactam (ZOSYN) IVPB  4.5 g Intravenous Q8H    sodium chloride 0.9%  3 mL Intravenous Q8H     PRN Meds:acetaminophen, hydrALAZINE, HYDROmorphone, melatonin, ondansetron, oxyCODONE-acetaminophen, promethazine (PHENERGAN) IVPB, sodium chloride 0.9%     Review of patient's allergies indicates:  No Known Allergies     Past Medical History:   Diagnosis Date    Deep vein thrombosis (DVT) 2002    Factor V deficiency     Hypertension      Past Surgical History:   Procedure Laterality Date    APPENDECTOMY      HYSTERECTOMY      TONSILLECTOMY       Family History     None        Tobacco Use    Smoking status: Never Smoker    Smokeless tobacco: Never Used   Substance and Sexual Activity    Alcohol use: Yes     Alcohol/week: 4.0 standard drinks     Types: 4 Glasses of wine per week    Drug use: No    Sexual activity: Not on file       Review of Systems   Constitutional: Positive for fatigue. Negative for fever and unexpected weight change.   HENT: Negative for postnasal drip and trouble swallowing.    Eyes: Negative for pain and visual disturbance.   Respiratory: Negative for cough and shortness of breath.    Cardiovascular: Negative for chest pain and leg swelling.   Gastrointestinal: Positive for  abdominal pain. Negative for constipation and diarrhea.   Genitourinary: Negative for difficulty urinating and pelvic pain.   Musculoskeletal: Negative.  Negative for back pain and gait problem.   Neurological: Negative for dizziness and headaches.   Psychiatric/Behavioral: Negative for behavioral problems. The patient is not nervous/anxious.      Objective:     Vital Signs (Most Recent):  Temp: 97.8 °F (36.6 °C) (12/22/20 1701)  Pulse: 83 (12/22/20 1900)  Resp: 20 (12/22/20 1701)  BP: (!) 158/91 (12/22/20 1445)  SpO2: 97 % (12/22/20 1445) Vital Signs (24h Range):  Temp:  [97.3 °F (36.3 °C)-98.9 °F (37.2 °C)] 97.8 °F (36.6 °C)  Pulse:  [] 83  Resp:  [16-20] 20  SpO2:  [94 %-100 %] 97 %  BP: (138-164)/(73-91) 158/91     Weight: 79.3 kg (174 lb 13.2 oz)  Body mass index is 28.22 kg/m².  Body surface area is 1.92 meters squared.      Intake/Output Summary (Last 24 hours) at 12/22/2020 1909  Last data filed at 12/22/2020 1744  Gross per 24 hour   Intake 350 ml   Output 2175 ml   Net -1825 ml       Physical Exam  Vitals signs and nursing note reviewed.   Constitutional:       General: She is not in acute distress.     Appearance: Normal appearance. She is well-developed.   HENT:      Head: Normocephalic and atraumatic.      Right Ear: External ear normal.      Left Ear: External ear normal.      Mouth/Throat:      Pharynx: No oropharyngeal exudate.   Eyes:      General: No scleral icterus.     Conjunctiva/sclera: Conjunctivae normal.   Neck:      Musculoskeletal: Normal range of motion and neck supple.      Thyroid: No thyromegaly.      Trachea: No tracheal deviation.   Cardiovascular:      Rate and Rhythm: Normal rate and regular rhythm.      Heart sounds: Normal heart sounds. No murmur.   Pulmonary:      Effort: Pulmonary effort is normal. No respiratory distress.      Breath sounds: Normal breath sounds. No wheezing or rales.   Abdominal:      General: Bowel sounds are normal. There is no distension.       Palpations: Abdomen is soft. There is no mass.      Tenderness: There is abdominal tenderness. There is no rebound.      Comments: Drain in place   Musculoskeletal: Normal range of motion.         General: No tenderness.   Lymphadenopathy:      Cervical: No cervical adenopathy.   Skin:     General: Skin is warm.      Findings: No erythema or rash.   Neurological:      Mental Status: She is alert and oriented to person, place, and time.      Cranial Nerves: No cranial nerve deficit.   Psychiatric:         Behavior: Behavior normal.         Significant Labs:   CBC:   Recent Labs   Lab 12/21/20  0730 12/22/20  0445   WBC 7.30 6.67   HGB 14.2 13.1   HCT 44.7 39.5    150   , CMP:   Recent Labs   Lab 12/21/20  0730 12/22/20  0445    141   K 3.7 3.8    107   CO2 25 25   * 110   BUN 9 9   CREATININE 0.8 0.7   CALCIUM 8.7 8.4*   PROT 6.9 5.7*   ALBUMIN 4.2 3.3*   BILITOT 0.4 0.7   ALKPHOS 107 118   AST 20 77*   ALT 21 104*   ANIONGAP 12 9   EGFRNONAA >60 >60   , LDH: No results for input(s): LDHCSF, BFSOURCE in the last 48 hours., LFTs:   Recent Labs   Lab 12/21/20  0730 12/22/20  0445   ALT 21 104*   AST 20 77*   ALKPHOS 107 118   BILITOT 0.4 0.7   PROT 6.9 5.7*   ALBUMIN 4.2 3.3*    and All pertinent labs from the last 24 hours have been reviewed.    Diagnostic Results:  CT: reviewed  U/S: Reviewed  I have reviewed all pertinent imaging results/findings within the past 24 hours.    Assessment/Plan:     * Acute cholecystitis  Status post cholecystectomy  Antibiotics per primary team    Family history of blood clots  Multiple siblings and patient's father had DVTs and required blood thinners    Factor V Leiden mutation  Patient states that she was checked about 30 years back and has multiple siblings and father with factor 5 Leiden  She does not know if she is a carrier or has the mutation  Will recheck patient for factor 5 and other thrombophilias  Optimal duration of prophylaxis is unknown,  patient is high risk with known thrombophilia, personal and Fhx of VTE , Continue prophylactic anticoagulation (xarelto 10mg or eliquis 2.5 BID)  for 4 weeks post surgery.   Long-term anticoagulation was briefly discussed with patient, pt will think about it, pending thrombophilia results, can be followed outpatient      History of thrombosis  Thirty years back, clot in groin vein  IVC filter was placed due to being perioperative  No anticoagulation was given        Thank you for your consult. I will follow-up with patient. Please contact us if you have any additional questions.    Melvina Hahn MD  Hematology/Oncology  [facility]

## 2020-12-23 NOTE — HPI
70 y.o. F with history of hypertension and factor 5 Leiden is admitted to the hospital with chest pain.  CTA in the ER was negative for any pulmonary embolism.  She was admitted for hypertensive in emergency.  Ultrasound abdomen was concerning for acute cholecystitis.  Subsequently patient underwent lap cholecystectomy.  Hematology was consulted as patient has history of factor 5 Leiden.    Patient stated that at the age of 41 or 42 she was dealing with endometriosis.  She had right leg swelling and Imaging for clot in the leg was negative, and finally when imaging was done higher up in the pelvic area blood clot was found.  Since she had to be taken for hysterectomy urgently blood thinners could not be given and [place] IVC filter was placed.  Patient did not receive any chronic anticoagulation.  She recalls, she was told at that time that IVC filter was an alternative to anticoagulation.  Other than that patient has not had any other blood clots in the legs or lungs patient states that her father also had factor 5 Leiden and had blood clots and multiple strokes.  She is 1 of the 10 siblings, and multiple siblings suffer from factor 5 Leiden, have clots and on blood thinners.  She mentions that her sisters had clots during pregnancy.

## 2020-12-24 VITALS
OXYGEN SATURATION: 95 % | BODY MASS INDEX: 28.7 KG/M2 | SYSTOLIC BLOOD PRESSURE: 140 MMHG | TEMPERATURE: 97 F | RESPIRATION RATE: 18 BRPM | HEART RATE: 78 BPM | DIASTOLIC BLOOD PRESSURE: 71 MMHG | HEIGHT: 66 IN | WEIGHT: 178.56 LBS

## 2020-12-24 LAB
ALBUMIN SERPL BCP-MCNC: 3.2 G/DL (ref 3.5–5.2)
ALP SERPL-CCNC: 160 U/L (ref 55–135)
ALT SERPL W/O P-5'-P-CCNC: 282 U/L (ref 10–44)
ANION GAP SERPL CALC-SCNC: 6 MMOL/L (ref 8–16)
AST SERPL-CCNC: 271 U/L (ref 10–40)
BASOPHILS # BLD AUTO: 0.03 K/UL (ref 0–0.2)
BASOPHILS NFR BLD: 0.5 % (ref 0–1.9)
BILIRUB SERPL-MCNC: 1.1 MG/DL (ref 0.1–1)
BUN SERPL-MCNC: 9 MG/DL (ref 8–23)
CALCIUM SERPL-MCNC: 8.5 MG/DL (ref 8.7–10.5)
CHLORIDE SERPL-SCNC: 101 MMOL/L (ref 95–110)
CO2 SERPL-SCNC: 28 MMOL/L (ref 23–29)
CREAT SERPL-MCNC: 0.7 MG/DL (ref 0.5–1.4)
DIFFERENTIAL METHOD: ABNORMAL
EOSINOPHIL # BLD AUTO: 0.1 K/UL (ref 0–0.5)
EOSINOPHIL NFR BLD: 2 % (ref 0–8)
ERYTHROCYTE [DISTWIDTH] IN BLOOD BY AUTOMATED COUNT: 12.9 % (ref 11.5–14.5)
EST. GFR  (AFRICAN AMERICAN): >60 ML/MIN/1.73 M^2
EST. GFR  (NON AFRICAN AMERICAN): >60 ML/MIN/1.73 M^2
GLUCOSE SERPL-MCNC: 103 MG/DL (ref 70–110)
HCT VFR BLD AUTO: 39.5 % (ref 37–48.5)
HGB BLD-MCNC: 12.7 G/DL (ref 12–16)
IMM GRANULOCYTES # BLD AUTO: 0.02 K/UL (ref 0–0.04)
IMM GRANULOCYTES NFR BLD AUTO: 0.4 % (ref 0–0.5)
LYMPHOCYTES # BLD AUTO: 1.1 K/UL (ref 1–4.8)
LYMPHOCYTES NFR BLD: 19.9 % (ref 18–48)
MCH RBC QN AUTO: 29.3 PG (ref 27–31)
MCHC RBC AUTO-ENTMCNC: 32.2 G/DL (ref 32–36)
MCV RBC AUTO: 91 FL (ref 82–98)
MONOCYTES # BLD AUTO: 0.4 K/UL (ref 0.3–1)
MONOCYTES NFR BLD: 7.8 % (ref 4–15)
NEUTROPHILS # BLD AUTO: 3.9 K/UL (ref 1.8–7.7)
NEUTROPHILS NFR BLD: 69.4 % (ref 38–73)
NRBC BLD-RTO: 0 /100 WBC
PLATELET # BLD AUTO: 129 K/UL (ref 150–350)
PMV BLD AUTO: 11.3 FL (ref 9.2–12.9)
POTASSIUM SERPL-SCNC: 3.6 MMOL/L (ref 3.5–5.1)
PROT SERPL-MCNC: 5.9 G/DL (ref 6–8.4)
RBC # BLD AUTO: 4.33 M/UL (ref 4–5.4)
SODIUM SERPL-SCNC: 135 MMOL/L (ref 136–145)
WBC # BLD AUTO: 5.63 K/UL (ref 3.9–12.7)

## 2020-12-24 PROCEDURE — 85025 COMPLETE CBC W/AUTO DIFF WBC: CPT

## 2020-12-24 PROCEDURE — 99217 PR OBSERVATION CARE DISCHARGE: ICD-10-PCS | Mod: ,,, | Performed by: PHYSICIAN ASSISTANT

## 2020-12-24 PROCEDURE — 99217 PR OBSERVATION CARE DISCHARGE: CPT | Mod: ,,, | Performed by: PHYSICIAN ASSISTANT

## 2020-12-24 PROCEDURE — 63600175 PHARM REV CODE 636 W HCPCS: Performed by: SPECIALIST

## 2020-12-24 PROCEDURE — 25000003 PHARM REV CODE 250: Performed by: SPECIALIST

## 2020-12-24 PROCEDURE — 96376 TX/PRO/DX INJ SAME DRUG ADON: CPT

## 2020-12-24 PROCEDURE — 94761 N-INVAS EAR/PLS OXIMETRY MLT: CPT

## 2020-12-24 PROCEDURE — G0378 HOSPITAL OBSERVATION PER HR: HCPCS

## 2020-12-24 PROCEDURE — 96372 THER/PROPH/DIAG INJ SC/IM: CPT

## 2020-12-24 PROCEDURE — 25000003 PHARM REV CODE 250: Performed by: PHYSICIAN ASSISTANT

## 2020-12-24 PROCEDURE — 80053 COMPREHEN METABOLIC PANEL: CPT

## 2020-12-24 PROCEDURE — 63600175 PHARM REV CODE 636 W HCPCS: Performed by: PHYSICIAN ASSISTANT

## 2020-12-24 PROCEDURE — 36415 COLL VENOUS BLD VENIPUNCTURE: CPT

## 2020-12-24 PROCEDURE — A4216 STERILE WATER/SALINE, 10 ML: HCPCS | Performed by: SPECIALIST

## 2020-12-24 RX ORDER — POLYETHYLENE GLYCOL 3350 17 G/17G
17 POWDER, FOR SOLUTION ORAL DAILY
Qty: 510 G | Refills: 0 | Status: SHIPPED | OUTPATIENT
Start: 2020-12-25 | End: 2021-01-23 | Stop reason: ALTCHOICE

## 2020-12-24 RX ORDER — DIPHENHYDRAMINE HCL 25 MG
25 CAPSULE ORAL EVERY 6 HOURS PRN
Status: DISCONTINUED | OUTPATIENT
Start: 2020-12-24 | End: 2020-12-24 | Stop reason: HOSPADM

## 2020-12-24 RX ORDER — ENOXAPARIN SODIUM 100 MG/ML
40 INJECTION SUBCUTANEOUS DAILY
Qty: 1.2 ML | Refills: 0 | Status: SHIPPED | OUTPATIENT
Start: 2020-12-25 | End: 2020-12-28

## 2020-12-24 RX ORDER — DOCUSATE SODIUM 100 MG/1
100 CAPSULE, LIQUID FILLED ORAL 2 TIMES DAILY
Qty: 30 CAPSULE | Refills: 0 | Status: SHIPPED | OUTPATIENT
Start: 2020-12-24 | End: 2021-01-23 | Stop reason: ALTCHOICE

## 2020-12-24 RX ORDER — OXYCODONE AND ACETAMINOPHEN 7.5; 325 MG/1; MG/1
1 TABLET ORAL EVERY 4 HOURS PRN
Qty: 30 TABLET | Refills: 0 | Status: SHIPPED | OUTPATIENT
Start: 2020-12-24 | End: 2021-01-23 | Stop reason: ALTCHOICE

## 2020-12-24 RX ADMIN — Medication 3 ML: at 05:12

## 2020-12-24 RX ADMIN — LOSARTAN POTASSIUM 50 MG: 50 TABLET, FILM COATED ORAL at 08:12

## 2020-12-24 RX ADMIN — OXYCODONE AND ACETAMINOPHEN 1 TABLET: 7.5; 325 TABLET ORAL at 01:12

## 2020-12-24 RX ADMIN — OXYCODONE AND ACETAMINOPHEN 1 TABLET: 7.5; 325 TABLET ORAL at 08:12

## 2020-12-24 RX ADMIN — OXYCODONE AND ACETAMINOPHEN 1 TABLET: 7.5; 325 TABLET ORAL at 03:12

## 2020-12-24 RX ADMIN — HEPARIN SODIUM 5000 UNITS: 5000 INJECTION INTRAVENOUS; SUBCUTANEOUS at 05:12

## 2020-12-24 RX ADMIN — HEPARIN SODIUM 5000 UNITS: 5000 INJECTION INTRAVENOUS; SUBCUTANEOUS at 02:12

## 2020-12-24 RX ADMIN — PANTOPRAZOLE SODIUM 40 MG: 40 TABLET, DELAYED RELEASE ORAL at 08:12

## 2020-12-24 RX ADMIN — DOCUSATE SODIUM 100 MG: 100 CAPSULE, LIQUID FILLED ORAL at 08:12

## 2020-12-24 RX ADMIN — PIPERACILLIN AND TAZOBACTAM 4.5 G: 4; .5 INJECTION, POWDER, LYOPHILIZED, FOR SOLUTION INTRAVENOUS; PARENTERAL at 03:12

## 2020-12-24 RX ADMIN — POLYETHYLENE GLYCOL 3350 17 G: 17 POWDER, FOR SOLUTION ORAL at 08:12

## 2020-12-24 NOTE — NURSING
In agreement and eager for DC.  VU of DC instructions, paperwork and prescriptions delivered.  Midline removed with cath tip intact, WNL.  To be DC home with family.    Will be escorted downstairs via  transport team once dressed and ready.     Free from falls or skin breakdown this hospital admission.

## 2020-12-24 NOTE — PLAN OF CARE
D/C Plan;  Discussed care plan throughout the day  Per JACKIE Loera and Dr Mora, no d/c needs  Sister Marjorie Harkins willfreddie/c today       12/24/20 1431   Final Note   Assessment Type Final Discharge Note   Anticipated Discharge Disposition Home   Hospital Follow Up  Appt(s) scheduled? Yes   Discharge plans and expectations educations in teach back method with documentation complete? Yes   Right Care Referral Info   Post Acute Recommendation No Care   Post-Acute Status   Discharge Delays None known at this time

## 2020-12-24 NOTE — DISCHARGE SUMMARY
"[facility]  Hospital Medicine  Discharge Summary      Patient Name: Marjorie Harkins  MRN: [id number]  Patient Class: OP- Observation  Admission Date: 12/21/2020  Hospital Length of Stay: 0 days  Discharge Date and Time: 12/24/2020  3:09 PM  Attending Physician: No att. providers found   Discharging Provider: Luz Maria Hodges PA-C  Primary Care Provider: JAQUI Chatman Jr. (Inactive)      HPI:   Marjorie Harkins is a 70 year old woman with medical history of hypertension, factor V Leiden deficiency with history of deep vein thrombosis with IVC filter placement and benign postional vertigo.  She presented to the [facility] ED for evaluation of acute chest discomfort. The patient reported that she awoke at approximately 3:00 am with epigastric pain.  She describes the pain as a "heaviness" across her chest, but centered in epigastric region.  She reported radiation of pain to right flank and noted one episode of nausea and dry heaving.  She states she took an two aspirin without relief.  She denies shortness of breath or diaphoresis.   On arrival to ED, patient was found to be severely hypertensive, 221/97.  Electrocardiogram showed no evidence of acute ischemia and initial troponin negative.  Given history of factor V Leiden deficiency, CTA chest was completed and showed no evidence of acute pulmonary embolism.  The patient's hypertension was treated with her home dose of losartan 50 mg and one dose of intravenous morphine with improvement to patient's blood pressure and resolution of chest pain.  She will be admitted for observation and to rule out cardiac source of chest pain.      Procedure(s) (LRB):  CHOLECYSTECTOMY, LAPAROSCOPIC (N/A)      Hospital Course:   70 year old female admitted to observation with complaint of chest pain/epigastric pain with noted severe hypertension.  Troponins negative x3, ECG no evidence of acute ischemia ACS ruled out.  Given history of factor 5 Leiden mutation CTA " of chest was done that showed No evidence for acute pulmonary thromboembolism through the segmental pulmonary arteries.  Patient continue to have right upper quadrant pain, ultrasound done with gallbladder demonstrating innumerable shadowing stones and wall thickening with positive sonographic Blake sign, concern for acute cholecystitis.  General surgery was consulted, patient underwent laparoscopic cholecystectomy 12/22/2020, drain was placed.  Patient continued with pain underwent MRCP that showed no significant biliary ductal dilatation or filling defect.  Heme Onc was consulted for recommendations of anticoagulation in the setting of factor 5 Leiden mutation.  Recommendation for prophylactic anticoagulation for 4 weeks post surgery.  Discussed with surgeon, hematologist, patient and friend regarding risk/benefits of anticoagulation.  Surgery requested to hold on oral anticoagulation 5 days postoperatively due to high risk of bleed, can continue Lovenox daily prophylactically.  Decision was for Lovenox for the next 3 days followed by Xarelto 10 mg daily for 4 weeks.  Patient is to follow-up with surgery in 2 weeks and Hematology.  Plan of care was discussed, all questions answered; patient and friend acknowledged and understand, and are agreeable with plan.  Stable for discharge home.     Consults:   Consults (From admission, onward)        Status Ordering Provider     Inpatient consult to General Surgery  Once     Provider:  Britta Medrano Jr., MD    Acknowledged BRITTA MEDRANO JR     Inpatient consult to Hematology/Oncology  Once     Provider:  Melvina Hahn MD    Completed BRITTA MEDRANO JR     Inpatient consult to Midline team  Once     Provider:  (Not yet assigned)    Acknowledged PATRICA DOCKERY          No new Assessment & Plan notes have been filed under this hospital service since the last note was generated.  Service: Hospital Medicine    Final Active Diagnoses:    Diagnosis Date Noted POA     PRINCIPAL PROBLEM:  Acute cholecystitis [K81.0] 12/22/2020 Yes    History of thrombosis [Z86.718] 12/22/2020 Not Applicable    Factor V Leiden mutation [D68.51] 12/22/2020 Yes    Family history of blood clots [Z82.49] 12/22/2020 Not Applicable      Problems Resolved During this Admission:       Discharged Condition: stable    Disposition: Home or Self Care    Follow Up:  Follow-up Information     Garry Marquez Jr, MD In 14 days.    Specialties: Vascular Surgery, General Surgery  Why: Post-Op  Contact information:  [address]  [address]  [facility] [id number] [phone]             JAQUI Chatman Jr..    Why: post hospital follow-up  Contact information:  [address] [facility] [id number]             Melvina Hahn MD In 3 weeks.    Specialty: Hematology and Oncology  Contact information:  [address]  [address]  [facility] [id number] [phone]                 Patient Instructions:      Diet Adult Regular     Lifting restrictions   Order Comments: No heavy lifting for one week     No driving until:   Order Comments: While taking pain medications     Notify your health care provider if you experience any of the following:  temperature >100.4     Notify your health care provider if you experience any of the following:  persistent nausea and vomiting or diarrhea     Notify your health care provider if you experience any of the following:  severe uncontrolled pain     Notify your health care provider if you experience any of the following:  difficulty breathing or increased cough     Notify your health care provider if you experience any of the following:  redness, tenderness, or signs of infection (pain, swelling, redness, odor or green/yellow discharge around incision site)     Notify your health care provider if you experience any of the following:  severe persistent headache     Notify your health care provider if you experience any of the following:  persistent dizziness, light-headedness,  or visual disturbances     Notify your health care provider if you experience any of the following:  increased confusion or weakness       Significant Diagnostic Studies: Labs:   CMP   Recent Labs   Lab 12/23/20 0451 12/24/20 0456     137 135*   K 4.1  4.1 3.6     100 101   CO2 25  25 28   *  125* 103   BUN 9  9 9   CREATININE 0.7  0.7 0.7   CALCIUM 9.0  9.0 8.5*   PROT 7.1 5.9*   ALBUMIN 3.9 3.2*   BILITOT 1.3* 1.1*   ALKPHOS 165* 160*   * 271*   * 282*   ANIONGAP 12  12 6*   ESTGFRAFRICA >60  >60 >60   EGFRNONAA >60  >60 >60   , CBC   Recent Labs   Lab 12/23/20 0451 12/24/20 0456   WBC 10.21 5.63   HGB 15.0 12.7   HCT 46.4 39.5    129*    and All labs within the past 24 hours have been reviewed  Radiology:   Imaging Results          CTA Chest Non-Coronary (PE Study) (Final result)  Result time 12/21/20 09:58:58    Final result by Monika Milton MD (12/21/20 09:58:58)                 Impression:      No evidence for acute pulmonary thromboembolism through the segmental pulmonary arteries.    Bibasilar atelectasis.  Otherwise, clear lungs.      Electronically signed by: Monika Milton  Date:    12/21/2020  Time:    09:58             Narrative:    EXAMINATION:  CTA CHEST NON CORONARY    CLINICAL HISTORY:  PE suspected, intermediate prob, positive D-dimer;    TECHNIQUE:  Technique: Contiguous 1.25-mm axial images were obtained from the thoracic inlet to the level upper abdomen following the administration of 75 cc of intravenous Omnipaque 350 Sagittal and coronal reformats were submitted.    COMPARISON:  None    FINDINGS:  No intraluminal filling defect in the trunk, main, lobar or segmental pulmonary arteries to suggest acute pulmonary embolus.    Trachea and proximal airways are patent.  The lungs are expanded. No acute consolidation, pleural effusion, or pneumothorax seen.  Dependent atelectasis.    The aorta is normal caliber.  No mediastinal or hilar  lymph node enlargement.  No pericardial effusion.                               X-Ray Chest AP Portable (Final result)  Result time 12/21/20 07:46:40    Final result by Jeff St DO (12/21/20 07:46:40)                 Impression:      No acute cardiopulmonary abnormality.      Electronically signed by: Jeff St  Date:    12/21/2020  Time:    07:46             Narrative:    EXAMINATION:  XR CHEST AP PORTABLE    CLINICAL HISTORY:  Chest Pain.    TECHNIQUE:  Single frontal view of the chest was performed.    COMPARISON:  None    FINDINGS:  The lungs are well expanded and clear. No focal opacities are seen. The pleural spaces are clear.  The cardiac silhouette is unremarkable.  The visualized osseous structures are unremarkable.                                  Pending Diagnostic Studies:     Procedure Component Value Units Date/Time    Specimen to Pathology, Surgery General Surgery [[id number]] Collected: 12/22/20 1316    Order Status: Sent Lab Status: In process Updated: 12/22/20 1632         Medications:  Reconciled Home Medications:      Medication List      START taking these medications    enoxaparin 40 mg/0.4 mL Syrg  Commonly known as: LOVENOX  Inject 0.4 mLs (40 mg total) into the skin once daily. for 3 days  Start taking on: December 25, 2020     oxyCODONE-acetaminophen 7.5-325 mg per tablet  Commonly known as: PERCOCET  Take 1 tablet by mouth every 4 (four) hours as needed.     polyethylene glycol 17 gram/dose powder  Commonly known as: GLYCOLAX  mix 17 g in liquid and take  by mouth once daily.  Start taking on: December 25, 2020     rivaroxaban 10 mg Tab  Commonly known as: XARELTO  Take 1 tablet (10 mg total) by mouth daily with dinner or evening meal.  Start taking on: December 28, 2020     STOOL SOFTENER 100 MG capsule  Generic drug: docusate sodium  Take 1 capsule (100 mg total) by mouth 2 (two) times daily.        CONTINUE taking these medications    clonazePAM 0.5 MG tablet  Commonly known  as: KLONOPIN  Take 0.5 mg by mouth nightly as needed.     losartan 50 MG tablet  Commonly known as: COZAAR  Take 50 mg by mouth.            Indwelling Lines/Drains at time of discharge:   Lines/Drains/Airways     Drain                 Closed/Suction Drain 12/22/20 1258 Abdomen Bulb 15 Fr. 1 day                Time spent on the discharge of patient: >30 minutes  Patient was seen and examined on the date of discharge and determined to be suitable for discharge.         Luz Maria Hodges PA-C  Department of Hospital Medicine  [facility]

## 2020-12-24 NOTE — PLAN OF CARE
Back in bed at this time.  Telemetry monitored p Return to room from MRI. HTN - VSS weaned back down to RA and afebrile this shift.  Pain managed per MAR adjustments and better controlled at 5/0-10 from this AM 8/0-10.    MRI completed x2 this shift, NPO for both exams per radiologist request. Nursing was notified per Lin with MRI that the patients stomach was full of fluid. Resulted - the biliary duct showed no change from the US.    HIDA scan ordered and remains outstanding.     Ambulated in isaacs and Tolerated po-Regular diet  Good UO c BRP.  - Flatus / - BM    IV access lost and Mid Line placed for IV abx administration Zosyn to be re timed per Pharmacy for Q8 hour  Laps x4 CDI c SS intact. Drain care provided totalling 40cc output this shift.     Free from injury or skin breakdown; Fall precautions maintained and call light in reach.  POC updated questions answered and comments acknowledged.  Purposeful hourly rounding completed this shift.

## 2020-12-24 NOTE — PLAN OF CARE
VSS and afebrile, aaox4. Lap sites CDI, marline x 1 putting out bloody drainage. Pt states pain control better overnight, PRN oxy 7.5 administered. Up to bathroom with assist. Tolerating regular diet. No n/v. Resting comfortably Plan of care reviewed with patient. Purposeful hourly rounding complete. Call light at bedside, bed at lowest position, brakes on, nonskid socks on. Will continue to monitor.

## 2020-12-30 LAB
FINAL PATHOLOGIC DIAGNOSIS: NORMAL
GROSS: NORMAL
Lab: NORMAL

## 2021-01-23 ENCOUNTER — OFFICE VISIT (OUTPATIENT)
Dept: URGENT CARE | Facility: CLINIC | Age: 71
End: 2021-01-23
Payer: COMMERCIAL

## 2021-01-23 VITALS
WEIGHT: 163 LBS | SYSTOLIC BLOOD PRESSURE: 111 MMHG | TEMPERATURE: 98 F | BODY MASS INDEX: 26.2 KG/M2 | OXYGEN SATURATION: 96 % | HEART RATE: 80 BPM | RESPIRATION RATE: 17 BRPM | DIASTOLIC BLOOD PRESSURE: 71 MMHG | HEIGHT: 66 IN

## 2021-01-23 DIAGNOSIS — N30.00 ACUTE CYSTITIS WITHOUT HEMATURIA: Primary | ICD-10-CM

## 2021-01-23 DIAGNOSIS — R30.0 DYSURIA: ICD-10-CM

## 2021-01-23 LAB
BILIRUB UR QL STRIP: NEGATIVE
GLUCOSE UR QL STRIP: NEGATIVE
KETONES UR QL STRIP: NEGATIVE
LEUKOCYTE ESTERASE UR QL STRIP: POSITIVE
PH, POC UA: 5.5 (ref 5–8)
POC BLOOD, URINE: NEGATIVE
POC NITRATES, URINE: POSITIVE
PROT UR QL STRIP: NEGATIVE
SP GR UR STRIP: 1.02 (ref 1–1.03)
UROBILINOGEN UR STRIP-ACNC: ABNORMAL (ref 0.1–1.1)

## 2021-01-23 PROCEDURE — 81003 POCT URINALYSIS, DIPSTICK, AUTOMATED, W/O SCOPE: ICD-10-PCS | Mod: QW,S$GLB,, | Performed by: NURSE PRACTITIONER

## 2021-01-23 PROCEDURE — 81003 URINALYSIS AUTO W/O SCOPE: CPT | Mod: QW,S$GLB,, | Performed by: NURSE PRACTITIONER

## 2021-01-23 PROCEDURE — 99213 PR OFFICE/OUTPT VISIT, EST, LEVL III, 20-29 MIN: ICD-10-PCS | Mod: 25,S$GLB,, | Performed by: NURSE PRACTITIONER

## 2021-01-23 PROCEDURE — 99213 OFFICE O/P EST LOW 20 MIN: CPT | Mod: 25,S$GLB,, | Performed by: NURSE PRACTITIONER

## 2021-01-23 RX ORDER — NITROFURANTOIN 25; 75 MG/1; MG/1
100 CAPSULE ORAL 2 TIMES DAILY
Qty: 10 CAPSULE | Refills: 0 | Status: SHIPPED | OUTPATIENT
Start: 2021-01-23 | End: 2021-01-28

## 2021-04-26 ENCOUNTER — PATIENT MESSAGE (OUTPATIENT)
Dept: RESEARCH | Facility: HOSPITAL | Age: 71
End: 2021-04-26

## 2021-08-04 ENCOUNTER — OFFICE VISIT (OUTPATIENT)
Dept: URGENT CARE | Facility: CLINIC | Age: 71
End: 2021-08-04
Payer: COMMERCIAL

## 2021-08-04 VITALS
HEART RATE: 75 BPM | RESPIRATION RATE: 18 BRPM | DIASTOLIC BLOOD PRESSURE: 88 MMHG | SYSTOLIC BLOOD PRESSURE: 166 MMHG | BODY MASS INDEX: 24.64 KG/M2 | TEMPERATURE: 98 F | OXYGEN SATURATION: 97 % | WEIGHT: 157 LBS | HEIGHT: 67 IN

## 2021-08-04 DIAGNOSIS — Z11.9 SCREENING EXAMINATION FOR UNSPECIFIED INFECTIOUS DISEASE: Primary | ICD-10-CM

## 2021-08-04 LAB
CTP QC/QA: YES
SARS-COV-2 RDRP RESP QL NAA+PROBE: NEGATIVE

## 2021-08-04 PROCEDURE — U0002 COVID-19 LAB TEST NON-CDC: HCPCS | Mod: QW,S$GLB,, | Performed by: NURSE PRACTITIONER

## 2021-08-04 PROCEDURE — 99213 PR OFFICE/OUTPT VISIT, EST, LEVL III, 20-29 MIN: ICD-10-PCS | Mod: S$GLB,CS,, | Performed by: NURSE PRACTITIONER

## 2021-08-04 PROCEDURE — U0002: ICD-10-PCS | Mod: QW,S$GLB,, | Performed by: NURSE PRACTITIONER

## 2021-08-04 PROCEDURE — 99213 OFFICE O/P EST LOW 20 MIN: CPT | Mod: S$GLB,CS,, | Performed by: NURSE PRACTITIONER

## 2021-10-12 ENCOUNTER — CLINICAL SUPPORT (OUTPATIENT)
Dept: URGENT CARE | Facility: CLINIC | Age: 71
End: 2021-10-12
Payer: COMMERCIAL

## 2021-10-12 DIAGNOSIS — Z23 NEED FOR VACCINATION: Primary | ICD-10-CM

## 2021-10-12 PROCEDURE — 90471 IMMUNIZATION ADMIN: CPT | Mod: S$GLB,,, | Performed by: PHYSICIAN ASSISTANT

## 2021-10-12 PROCEDURE — 90471 FLU VACCINE - QUADRIVALENT - ADJUVANTED: ICD-10-PCS | Mod: S$GLB,,, | Performed by: PHYSICIAN ASSISTANT

## 2021-10-12 PROCEDURE — 90694 FLU VACCINE - QUADRIVALENT - ADJUVANTED: ICD-10-PCS | Mod: S$GLB,,, | Performed by: PHYSICIAN ASSISTANT

## 2021-10-12 PROCEDURE — 90694 VACC AIIV4 NO PRSRV 0.5ML IM: CPT | Mod: S$GLB,,, | Performed by: PHYSICIAN ASSISTANT

## 2021-11-23 ENCOUNTER — IMMUNIZATION (OUTPATIENT)
Dept: INTERNAL MEDICINE | Facility: CLINIC | Age: 71
End: 2021-11-23
Payer: COMMERCIAL

## 2021-11-23 DIAGNOSIS — Z23 NEED FOR VACCINATION: Primary | ICD-10-CM

## 2021-11-23 PROCEDURE — 0064A COVID-19, MRNA, LNP-S, PF, 100 MCG/0.25 ML DOSE VACCINE (MODERNA BOOSTER): CPT | Mod: PBBFAC | Performed by: INTERNAL MEDICINE

## 2021-12-21 ENCOUNTER — HOSPITAL ENCOUNTER (OUTPATIENT)
Dept: RADIOLOGY | Facility: HOSPITAL | Age: 71
Discharge: HOME | End: 2021-12-21
Attending: ORTHOPAEDIC SURGERY
Payer: COMMERCIAL

## 2021-12-21 ENCOUNTER — OFFICE VISIT (OUTPATIENT)
Dept: ORTHOPEDICS | Facility: CLINIC | Age: 71
End: 2021-12-21
Payer: COMMERCIAL

## 2021-12-21 VITALS — HEIGHT: 67 IN | WEIGHT: 162.13 LBS | BODY MASS INDEX: 25.45 KG/M2

## 2021-12-21 DIAGNOSIS — R52 PAIN: ICD-10-CM

## 2021-12-21 DIAGNOSIS — G57.81 INTERDIGITAL NEUROMA OF RIGHT FOOT: Primary | ICD-10-CM

## 2021-12-21 DIAGNOSIS — R52 PAIN: Primary | ICD-10-CM

## 2021-12-21 PROCEDURE — 99999 PR PBB SHADOW E&M-EST. PATIENT-LVL II: ICD-10-PCS | Mod: PBBFAC,,, | Performed by: ORTHOPAEDIC SURGERY

## 2021-12-21 PROCEDURE — 73630 XR FOOT COMPLETE 3 VIEW RIGHT: ICD-10-PCS | Mod: 26,RT,, | Performed by: RADIOLOGY

## 2021-12-21 PROCEDURE — 99203 OFFICE O/P NEW LOW 30 MIN: CPT | Mod: S$GLB,,, | Performed by: ORTHOPAEDIC SURGERY

## 2021-12-21 PROCEDURE — 73630 X-RAY EXAM OF FOOT: CPT | Mod: 26,RT,, | Performed by: RADIOLOGY

## 2021-12-21 PROCEDURE — 1157F ADVNC CARE PLAN IN RCRD: CPT | Mod: CPTII,S$GLB,, | Performed by: ORTHOPAEDIC SURGERY

## 2021-12-21 PROCEDURE — 99999 PR PBB SHADOW E&M-EST. PATIENT-LVL II: CPT | Mod: PBBFAC,,, | Performed by: ORTHOPAEDIC SURGERY

## 2021-12-21 PROCEDURE — 4010F ACE/ARB THERAPY RXD/TAKEN: CPT | Mod: CPTII,S$GLB,, | Performed by: ORTHOPAEDIC SURGERY

## 2021-12-21 PROCEDURE — 1157F PR ADVANCE CARE PLAN OR EQUIV PRESENT IN MEDICAL RECORD: ICD-10-PCS | Mod: CPTII,S$GLB,, | Performed by: ORTHOPAEDIC SURGERY

## 2021-12-21 PROCEDURE — 99203 PR OFFICE/OUTPT VISIT, NEW, LEVL III, 30-44 MIN: ICD-10-PCS | Mod: S$GLB,,, | Performed by: ORTHOPAEDIC SURGERY

## 2021-12-21 PROCEDURE — 73630 X-RAY EXAM OF FOOT: CPT | Mod: TC,RT

## 2021-12-21 PROCEDURE — 4010F PR ACE/ARB THEARPY RXD/TAKEN: ICD-10-PCS | Mod: CPTII,S$GLB,, | Performed by: ORTHOPAEDIC SURGERY

## 2022-06-30 ENCOUNTER — OFFICE VISIT (OUTPATIENT)
Dept: URGENT CARE | Facility: CLINIC | Age: 72
End: 2022-06-30
Payer: COMMERCIAL

## 2022-06-30 VITALS
HEIGHT: 67 IN | RESPIRATION RATE: 16 BRPM | WEIGHT: 162 LBS | HEART RATE: 75 BPM | SYSTOLIC BLOOD PRESSURE: 161 MMHG | BODY MASS INDEX: 25.43 KG/M2 | TEMPERATURE: 97 F | OXYGEN SATURATION: 98 % | DIASTOLIC BLOOD PRESSURE: 91 MMHG

## 2022-06-30 DIAGNOSIS — H61.22 IMPACTED CERUMEN OF LEFT EAR: Primary | ICD-10-CM

## 2022-06-30 PROCEDURE — 3008F PR BODY MASS INDEX (BMI) DOCUMENTED: ICD-10-PCS | Mod: CPTII,S$GLB,, | Performed by: NURSE PRACTITIONER

## 2022-06-30 PROCEDURE — 1160F PR REVIEW ALL MEDS BY PRESCRIBER/CLIN PHARMACIST DOCUMENTED: ICD-10-PCS | Mod: CPTII,S$GLB,, | Performed by: NURSE PRACTITIONER

## 2022-06-30 PROCEDURE — 4010F PR ACE/ARB THEARPY RXD/TAKEN: ICD-10-PCS | Mod: CPTII,S$GLB,, | Performed by: NURSE PRACTITIONER

## 2022-06-30 PROCEDURE — 3077F PR MOST RECENT SYSTOLIC BLOOD PRESSURE >= 140 MM HG: ICD-10-PCS | Mod: CPTII,S$GLB,, | Performed by: NURSE PRACTITIONER

## 2022-06-30 PROCEDURE — 1160F RVW MEDS BY RX/DR IN RCRD: CPT | Mod: CPTII,S$GLB,, | Performed by: NURSE PRACTITIONER

## 2022-06-30 PROCEDURE — 1157F ADVNC CARE PLAN IN RCRD: CPT | Mod: CPTII,S$GLB,, | Performed by: NURSE PRACTITIONER

## 2022-06-30 PROCEDURE — 3077F SYST BP >= 140 MM HG: CPT | Mod: CPTII,S$GLB,, | Performed by: NURSE PRACTITIONER

## 2022-06-30 PROCEDURE — 3008F BODY MASS INDEX DOCD: CPT | Mod: CPTII,S$GLB,, | Performed by: NURSE PRACTITIONER

## 2022-06-30 PROCEDURE — 3080F DIAST BP >= 90 MM HG: CPT | Mod: CPTII,S$GLB,, | Performed by: NURSE PRACTITIONER

## 2022-06-30 PROCEDURE — 4010F ACE/ARB THERAPY RXD/TAKEN: CPT | Mod: CPTII,S$GLB,, | Performed by: NURSE PRACTITIONER

## 2022-06-30 PROCEDURE — 99214 PR OFFICE/OUTPT VISIT, EST, LEVL IV, 30-39 MIN: ICD-10-PCS | Mod: 25,S$GLB,, | Performed by: NURSE PRACTITIONER

## 2022-06-30 PROCEDURE — 1157F PR ADVANCE CARE PLAN OR EQUIV PRESENT IN MEDICAL RECORD: ICD-10-PCS | Mod: CPTII,S$GLB,, | Performed by: NURSE PRACTITIONER

## 2022-06-30 PROCEDURE — 99214 OFFICE O/P EST MOD 30 MIN: CPT | Mod: 25,S$GLB,, | Performed by: NURSE PRACTITIONER

## 2022-06-30 PROCEDURE — 3080F PR MOST RECENT DIASTOLIC BLOOD PRESSURE >= 90 MM HG: ICD-10-PCS | Mod: CPTII,S$GLB,, | Performed by: NURSE PRACTITIONER

## 2022-06-30 PROCEDURE — 69209 REMOVE IMPACTED EAR WAX UNI: CPT | Mod: LT,S$GLB,, | Performed by: NURSE PRACTITIONER

## 2022-06-30 PROCEDURE — 69209 EAR CERUMEN REMOVAL: ICD-10-PCS | Mod: LT,S$GLB,, | Performed by: NURSE PRACTITIONER

## 2022-06-30 PROCEDURE — 1159F PR MEDICATION LIST DOCUMENTED IN MEDICAL RECORD: ICD-10-PCS | Mod: CPTII,S$GLB,, | Performed by: NURSE PRACTITIONER

## 2022-06-30 PROCEDURE — 1159F MED LIST DOCD IN RCRD: CPT | Mod: CPTII,S$GLB,, | Performed by: NURSE PRACTITIONER

## 2022-06-30 NOTE — PROGRESS NOTES
"Subjective:       Patient ID: Marjorie Harkins is a 71 y.o. female.    Vitals:  height is 5' 7" (1.702 m) and weight is 73.5 kg (162 lb). Her temperature is 96.9 °F (36.1 °C). Her blood pressure is 161/91 (abnormal) and her pulse is 75. Her respiration is 16 and oxygen saturation is 98%.     Chief Complaint: Otalgia    Pt thinks she has something in the left ear. She says she went swimming this morning, after taking earbuds out she felt like it didn't come out of ear. She isn't sure if it may be a foreign body or earwax build up.     Otalgia   There is pain in the left ear. This is a new problem. The current episode started today. The problem occurs constantly. The problem has been unchanged. There has been no fever. The pain is at a severity of 0/10. The patient is experiencing no pain. Associated symptoms include hearing loss. Pertinent negatives include no abdominal pain, coughing, diarrhea, ear discharge, headaches, neck pain, rash, rhinorrhea, sore throat or vomiting. She has tried nothing for the symptoms.       HENT: Positive for ear pain and hearing loss. Negative for ear discharge and sore throat.    Neck: Negative for neck pain.   Respiratory: Negative for cough.    Gastrointestinal: Negative for abdominal pain, vomiting and diarrhea.   Skin: Negative for rash.   Neurological: Negative for headaches.       Objective:      Physical Exam   Constitutional: She is oriented to person, place, and time. She appears well-developed. She is cooperative.  Non-toxic appearance. She does not appear ill. No distress.   HENT:   Head: Normocephalic and atraumatic.   Ears:   Right Ear: Hearing, tympanic membrane, external ear and ear canal normal.   Left Ear: Hearing, tympanic membrane, external ear and ear canal normal. There is cerumen present.   Nose: Nose normal. No mucosal edema, rhinorrhea or nasal deformity. No epistaxis. Right sinus exhibits no maxillary sinus tenderness and no frontal sinus tenderness. Left sinus " exhibits no maxillary sinus tenderness and no frontal sinus tenderness.   Mouth/Throat: Uvula is midline, oropharynx is clear and moist and mucous membranes are normal. No trismus in the jaw. Normal dentition. No uvula swelling. No oropharyngeal exudate, posterior oropharyngeal edema or posterior oropharyngeal erythema.   Eyes: Conjunctivae and lids are normal. No scleral icterus.   Neck: Trachea normal and phonation normal. Neck supple. No edema present. No erythema present. No neck rigidity present.   Cardiovascular: Normal rate, regular rhythm, normal heart sounds and normal pulses.   Pulmonary/Chest: Effort normal and breath sounds normal. No respiratory distress. She has no decreased breath sounds. She has no rhonchi.   Abdominal: Normal appearance.   Musculoskeletal: Normal range of motion.         General: No deformity. Normal range of motion.   Neurological: She is alert and oriented to person, place, and time. She exhibits normal muscle tone. Coordination normal.   Skin: Skin is warm, dry, intact, not diaphoretic and not pale.   Psychiatric: Her speech is normal and behavior is normal. Judgment and thought content normal.   Nursing note and vitals reviewed.        Assessment:       1. Impacted cerumen of left ear          Plan:         Impacted cerumen of left ear  -     Ear Cerumen Removal

## 2022-06-30 NOTE — PROCEDURES
Ear Cerumen Removal    Date/Time: 6/30/2022 1:15 PM  Performed by: Lulú Diaz NP  Authorized by: Lulú Diaz NP     Consent Done?:  Yes (Verbal)    Local anesthetic:  None  Medication Used:  Debrox  Location details:  Left ear  Procedure type: irrigation    Cerumen  Removal Results:  Cerumen completely removed  Patient tolerance:  Patient tolerated the procedure well with no immediate complications     TM and canal normal post procedure

## 2022-10-21 ENCOUNTER — IMMUNIZATION (OUTPATIENT)
Dept: PRIMARY CARE CLINIC | Facility: CLINIC | Age: 72
End: 2022-10-21
Payer: COMMERCIAL

## 2022-10-21 DIAGNOSIS — Z23 NEED FOR VACCINATION: Primary | ICD-10-CM

## 2022-10-21 PROCEDURE — 0134A COVID-19, MRNA, LNP-S, BIVALENT BOOSTER, PF, 50 MCG/0.5 ML: CPT | Mod: CV19,PBBFAC | Performed by: INTERNAL MEDICINE

## 2022-10-21 PROCEDURE — 91313 COVID-19, MRNA, LNP-S, BIVALENT BOOSTER, PF, 50 MCG/0.5 ML: CPT | Mod: PBBFAC | Performed by: INTERNAL MEDICINE

## 2023-06-27 ENCOUNTER — OFFICE VISIT (OUTPATIENT)
Dept: URGENT CARE | Facility: CLINIC | Age: 73
End: 2023-06-27
Payer: MEDICARE

## 2023-06-27 VITALS
TEMPERATURE: 98 F | WEIGHT: 159.69 LBS | DIASTOLIC BLOOD PRESSURE: 85 MMHG | SYSTOLIC BLOOD PRESSURE: 131 MMHG | RESPIRATION RATE: 19 BRPM | OXYGEN SATURATION: 95 % | HEART RATE: 89 BPM | HEIGHT: 67 IN | BODY MASS INDEX: 25.06 KG/M2

## 2023-06-27 DIAGNOSIS — H61.21 IMPACTED CERUMEN OF RIGHT EAR: Primary | ICD-10-CM

## 2023-06-27 DIAGNOSIS — H93.8X3 SENSATION OF FULLNESS IN BOTH EARS: ICD-10-CM

## 2023-06-27 DIAGNOSIS — H61.22 CERUMEN DEBRIS ON TYMPANIC MEMBRANE, LEFT: ICD-10-CM

## 2023-06-27 PROCEDURE — 99213 OFFICE O/P EST LOW 20 MIN: CPT | Mod: 25,S$GLB,, | Performed by: NURSE PRACTITIONER

## 2023-06-27 PROCEDURE — 99213 PR OFFICE/OUTPT VISIT, EST, LEVL III, 20-29 MIN: ICD-10-PCS | Mod: 25,S$GLB,, | Performed by: NURSE PRACTITIONER

## 2023-06-27 PROCEDURE — 69210 EAR CERUMEN REMOVAL: ICD-10-PCS | Mod: S$GLB,,, | Performed by: NURSE PRACTITIONER

## 2023-06-27 PROCEDURE — 69210 REMOVE IMPACTED EAR WAX UNI: CPT | Mod: S$GLB,,, | Performed by: NURSE PRACTITIONER

## 2023-06-27 RX ORDER — FLUTICASONE PROPIONATE 50 MCG
2 SPRAY, SUSPENSION (ML) NASAL DAILY PRN
Qty: 15.8 ML | Refills: 0 | Status: SHIPPED | OUTPATIENT
Start: 2023-06-27

## 2023-06-27 RX ORDER — ACYCLOVIR 50 MG/G
OINTMENT TOPICAL
COMMUNITY
Start: 2023-03-09 | End: 2024-03-08

## 2023-06-27 NOTE — PATIENT INSTRUCTIONS
Do not use cotton tipped swabs to clean inside your ears. Sticking anything into the ears can push the ear wax in deeper and cause impactions.  You can dip a cotton ball in mineral oil and place it in your outer ear for 10 to 20 minutes once a week. This will help keep your ear wax soft. It may help prevent the ear wax from building up again. Do not push the cotton into the ear canal.  You may want to take medicine like ibuprofen, naproxen, or acetaminophen to help with pain.  Please return here or go to the Emergency Department for any concerns or worsening of condition.  Please follow up with your primary care doctor or specialist as needed.    If you  smoke, please stop smoking.

## 2023-06-27 NOTE — PROGRESS NOTES
"Subjective:      Patient ID: Marjorie Harkins is a 72 y.o. female.    Vitals:  height is 5' 7" (1.702 m) and weight is 72.4 kg (159 lb 11.2 oz). Her temperature is 97.9 °F (36.6 °C). Her blood pressure is 131/85 and her pulse is 89. Her respiration is 19 and oxygen saturation is 95%.     Chief Complaint: Ear Fullness    Patient present with right ear fullness she a swimmer on a daily basis and she still has not been able to clear her right ear .    72 year old female presents to clinic with complaints of water in ear. She reports swimming daily and has been unable to clear her right ear. History positive for impacted cerumen     Ear Fullness   There is pain in the right ear. This is a new problem. The current episode started in the past 7 days. The problem occurs constantly. The problem has been unchanged. There has been no fever. The pain is at a severity of 2/10. The pain is mild. Associated symptoms include hearing loss. Pertinent negatives include no abdominal pain, coughing, diarrhea, ear discharge, headaches, neck pain, rash, rhinorrhea, sore throat or vomiting. She has tried nothing for the symptoms. The treatment provided no relief.     HENT:  Positive for hearing loss. Negative for ear pain, ear discharge, foreign body in ear, tinnitus and sore throat.         Ear fullness   Neck: Negative for neck pain.   Respiratory:  Negative for cough.    Gastrointestinal:  Negative for abdominal pain, vomiting and diarrhea.   Skin:  Negative for rash and erythema.   Neurological:  Negative for headaches.    Objective:     Physical Exam   Constitutional: She is oriented to person, place, and time. She appears well-developed.   HENT:   Head: Normocephalic and atraumatic. Head is without abrasion, without contusion and without laceration.   Ears:   Right Ear: External ear normal. There is cerumen present. Tympanic membrane is bulging.   Left Ear: External ear normal. There is cerumen present.   Nose: Mucosal edema present. "   Mouth/Throat: Oropharynx is clear and moist and mucous membranes are normal.   Eyes: EOM and lids are normal. Extraocular movement intact   Neck: Trachea normal and phonation normal. Neck supple.   Cardiovascular: Normal rate.   Pulmonary/Chest: Effort normal. No stridor. No respiratory distress.   Musculoskeletal: Normal range of motion.         General: Normal range of motion.   Neurological: She is alert and oriented to person, place, and time.   Skin: Skin is warm, dry, intact and no rash. Capillary refill takes less than 2 seconds. No abrasion, No burn, No bruising, No erythema and No ecchymosis   Psychiatric: Her speech is normal and behavior is normal. Judgment and thought content normal.   Nursing note and vitals reviewed.    Assessment:     1. Impacted cerumen of right ear    2. Sensation of fullness in both ears    3. Cerumen debris on tympanic membrane, left        Plan:       Impacted cerumen of right ear    Sensation of fullness in both ears    Cerumen debris on tympanic membrane, left    Other orders  -     fluticasone propionate (FLONASE) 50 mcg/actuation nasal spray; 2 sprays (100 mcg total) by Each Nostril route daily as needed (nasal congestion).  Dispense: 15.8 mL; Refill: 0    verbal consent and ear wax removal procedure and expectations/risks with patient discussed. Informed complications using Cerumenolytics can lead to allergic reactions, otitis externa, earache, transient hearing loss, and dizziness. A common adverse effect of irrigation is retention of water behind incompletely removed cerumen, resulting in maceration of the skin and potential infection, tympanic membrane perforation, hearing loss, tinnitus, pain, and vertigo can also occur, particularly after aggressive irrigation for cerumen accumulation. The most common adverse effects with manual removal of cerumen include ear pain, bleeding, laceration, and perforation of the tympanic membrane. The cerumen was removed with warm water  "irrigation. There was no evidence of TM perforation upon reevaluation of the ear after cerumen removal . Patient tolerated the procedure well without any complications   Ear Cerumen Removal    Date/Time: 6/27/2023 1:45 PM  Performed by: Daysi Salmeron NP  Authorized by: Daysi Salmeron NP     Time out: Immediately prior to procedure a "time out" was called to verify the correct patient, procedure, equipment, support staff and site/side marked as required.    Consent Done?:  Yes (Verbal)    Local anesthetic:  None  Cerumenolytics applied prior to procedure: cerumen soft.    Medication Used:  Other  Location details:  Both ears  Procedure type: curette and irrigation    Cerumen  Removal Results:  Cerumen completely removed  Patient tolerance:  Patient tolerated the procedure well with no immediate complications   Patient Instructions   Do not use cotton tipped swabs to clean inside your ears. Sticking anything into the ears can push the ear wax in deeper and cause impactions.  You can dip a cotton ball in mineral oil and place it in your outer ear for 10 to 20 minutes once a week. This will help keep your ear wax soft. It may help prevent the ear wax from building up again. Do not push the cotton into the ear canal.  You may want to take medicine like ibuprofen, naproxen, or acetaminophen to help with pain.  Please return here or go to the Emergency Department for any concerns or worsening of condition.  Please follow up with your primary care doctor or specialist as needed.    If you  smoke, please stop smoking.                "

## 2023-06-27 NOTE — PROCEDURES
"Ear Cerumen Removal    Date/Time: 6/27/2023 1:45 PM  Performed by: Daysi Salmeron NP  Authorized by: Daysi Salmeron NP     Time out: Immediately prior to procedure a "time out" was called to verify the correct patient, procedure, equipment, support staff and site/side marked as required.    Consent Done?:  Yes (Verbal)    Local anesthetic:  None  Cerumenolytics applied prior to procedure: cerumen soft.    Medication Used:  Other  Location details:  Both ears  Procedure type: curette and irrigation    Cerumen  Removal Results:  Cerumen completely removed  Patient tolerance:  Patient tolerated the procedure well with no immediate complications  "

## 2023-07-25 ENCOUNTER — OFFICE VISIT (OUTPATIENT)
Dept: URGENT CARE | Facility: CLINIC | Age: 73
End: 2023-07-25
Payer: COMMERCIAL

## 2023-07-25 VITALS
RESPIRATION RATE: 19 BRPM | HEIGHT: 67 IN | TEMPERATURE: 98 F | DIASTOLIC BLOOD PRESSURE: 93 MMHG | WEIGHT: 159 LBS | HEART RATE: 78 BPM | SYSTOLIC BLOOD PRESSURE: 175 MMHG | BODY MASS INDEX: 24.96 KG/M2 | OXYGEN SATURATION: 98 %

## 2023-07-25 DIAGNOSIS — H00.012 HORDEOLUM EXTERNUM OF RIGHT LOWER EYELID: ICD-10-CM

## 2023-07-25 DIAGNOSIS — M25.461 PAIN AND SWELLING OF RIGHT KNEE: Primary | ICD-10-CM

## 2023-07-25 DIAGNOSIS — M25.561 PAIN AND SWELLING OF RIGHT KNEE: Primary | ICD-10-CM

## 2023-07-25 PROBLEM — F51.01 PRIMARY INSOMNIA: Status: ACTIVE | Noted: 2021-01-07

## 2023-07-25 PROCEDURE — 99213 PR OFFICE/OUTPT VISIT, EST, LEVL III, 20-29 MIN: ICD-10-PCS | Mod: S$GLB,,, | Performed by: NURSE PRACTITIONER

## 2023-07-25 PROCEDURE — 99213 OFFICE O/P EST LOW 20 MIN: CPT | Mod: S$GLB,,, | Performed by: NURSE PRACTITIONER

## 2023-07-25 RX ORDER — IBUPROFEN 600 MG/1
600 TABLET ORAL 2 TIMES DAILY PRN
Qty: 10 TABLET | Refills: 0 | Status: SHIPPED | OUTPATIENT
Start: 2023-07-25 | End: 2023-07-30

## 2023-07-25 RX ORDER — ERYTHROMYCIN 5 MG/G
OINTMENT OPHTHALMIC EVERY 6 HOURS
Qty: 3.5 G | Refills: 0 | Status: SHIPPED | OUTPATIENT
Start: 2023-07-25 | End: 2023-08-01

## 2023-07-25 NOTE — PATIENT INSTRUCTIONS
Please drink plenty of fluids.  Please get plenty of rest.  Please return here or go to the Emergency Department for any concerns or worsening of condition.  You were prescribed an anti-inflammatory medication, Do not take these medications on an empty stomach.  Rest, ice, compression and elevation to the affected joint or limb as needed.  Please follow up with your primary care doctor or specialist as needed.

## 2023-07-25 NOTE — PROGRESS NOTES
"Subjective:      Patient ID: Marjorie Harkins is a 72 y.o. female.    Vitals:  height is 5' 7" (1.702 m) and weight is 72.1 kg (159 lb). Her oral temperature is 98 °F (36.7 °C). Her blood pressure is 175/93 (abnormal) and her pulse is 78. Her respiration is 19 and oxygen saturation is 98%.     Chief Complaint: Eye Problem    Patient present with right knee swollen and hot , itchy not getting better and Her right low lid of eye is ir rated.She using a warm compress theres some pus that come out of it.        72 year old female presents to clinic with complaints of red, hot swollen right knee and itchy red right lower eyelid. She reports working in her garden for 4 days on her knees without supportive cushion treating with topical antibiotic ointment.           Eye Problem   The right eye is affected. This is a new problem. Episode onset: 3 days. The problem occurs constantly. The problem has been gradually worsening. The pain is at a severity of 4/10. The pain is moderate. There is No known exposure to pink eye. She Does not wear contacts. Associated symptoms include an eye discharge, eye redness and itching. Pertinent negatives include no blurred vision, double vision, fever, foreign body sensation, nausea, photophobia, recent URI or vomiting. She has tried nothing for the symptoms. The treatment provided no relief.     Constitution: Negative for fever.   Eyes:  Positive for eye discharge, eye itching and eye redness. Negative for photophobia, double vision, blurred vision and eyelid swelling.   Gastrointestinal:  Negative for nausea and vomiting.   Musculoskeletal:  Positive for pain, joint pain, joint swelling and muscle ache. Negative for trauma and abnormal ROM of joint.    Objective:     Physical Exam   Constitutional: She is oriented to person, place, and time. She appears well-developed. She is cooperative. No distress.   HENT:   Head: Normocephalic and atraumatic.   Nose: Nose normal.   Mouth/Throat: Oropharynx " is clear and moist and mucous membranes are normal.   Eyes: Lids are normal. Right eye exhibits hordeolum. Right conjunctiva is injected.     Extraocular movement intact vision grossly intact gaze aligned appropriately   Neck: Trachea normal and phonation normal. Neck supple.   Cardiovascular: Normal rate.   Pulmonary/Chest: Effort normal.   Abdominal: Normal appearance.   Musculoskeletal:         General: No deformity.      Right knee: She exhibits swelling. She exhibits normal range of motion, no effusion, no deformity and no erythema. Tenderness found.   Neurological: She is alert and oriented to person, place, and time. She has normal strength and normal reflexes. No sensory deficit.   Skin: Skin is warm, dry, intact and not diaphoretic.   Psychiatric: Her speech is normal and behavior is normal. Judgment and thought content normal.   Nursing note and vitals reviewed.    Assessment:     1. Pain and swelling of right knee    2. Hordeolum externum of right lower eyelid        Plan:       Pain and swelling of right knee  -     BANDAGE ELASTIC 6IN ACE  -     ibuprofen (ADVIL,MOTRIN) 600 MG tablet; Take 1 tablet (600 mg total) by mouth 2 (two) times daily as needed for Pain.  Dispense: 10 tablet; Refill: 0    Hordeolum externum of right lower eyelid  -     erythromycin (ROMYCIN) ophthalmic ointment; Place into the right eye every 6 (six) hours. for 7 days  Dispense: 3.5 g; Refill: 0      Patient Instructions   Please drink plenty of fluids.  Please get plenty of rest.  Please return here or go to the Emergency Department for any concerns or worsening of condition.  You were prescribed an anti-inflammatory medication, Do not take these medications on an empty stomach.  Rest, ice, compression and elevation to the affected joint or limb as needed.  Please follow up with your primary care doctor or specialist as needed.

## 2023-08-09 ENCOUNTER — OFFICE VISIT (OUTPATIENT)
Dept: URGENT CARE | Facility: CLINIC | Age: 73
End: 2023-08-09
Payer: MEDICARE

## 2023-08-09 ENCOUNTER — PATIENT MESSAGE (OUTPATIENT)
Dept: URGENT CARE | Facility: CLINIC | Age: 73
End: 2023-08-09

## 2023-08-09 VITALS
RESPIRATION RATE: 20 BRPM | TEMPERATURE: 100 F | WEIGHT: 159 LBS | HEIGHT: 67 IN | SYSTOLIC BLOOD PRESSURE: 177 MMHG | BODY MASS INDEX: 24.96 KG/M2 | OXYGEN SATURATION: 98 % | HEART RATE: 86 BPM | DIASTOLIC BLOOD PRESSURE: 97 MMHG

## 2023-08-09 DIAGNOSIS — J06.9 UPPER RESPIRATORY TRACT INFECTION, UNSPECIFIED TYPE: Primary | ICD-10-CM

## 2023-08-09 LAB
CTP QC/QA: YES
SARS-COV-2 AG RESP QL IA.RAPID: NEGATIVE

## 2023-08-09 PROCEDURE — 99213 PR OFFICE/OUTPT VISIT, EST, LEVL III, 20-29 MIN: ICD-10-PCS | Mod: S$GLB,,, | Performed by: NURSE PRACTITIONER

## 2023-08-09 PROCEDURE — 99213 OFFICE O/P EST LOW 20 MIN: CPT | Mod: S$GLB,,, | Performed by: NURSE PRACTITIONER

## 2023-08-09 PROCEDURE — 87811 SARS-COV-2 COVID19 W/OPTIC: CPT | Mod: QW,S$GLB,, | Performed by: NURSE PRACTITIONER

## 2023-08-09 PROCEDURE — 87811 SARS CORONAVIRUS 2 ANTIGEN POCT, MANUAL READ: ICD-10-PCS | Mod: QW,S$GLB,, | Performed by: NURSE PRACTITIONER

## 2023-08-09 RX ORDER — BENZONATATE 200 MG/1
200 CAPSULE ORAL 3 TIMES DAILY PRN
Qty: 30 CAPSULE | Refills: 0 | Status: SHIPPED | OUTPATIENT
Start: 2023-08-09 | End: 2023-08-19

## 2023-08-09 RX ORDER — MONTELUKAST SODIUM 10 MG/1
10 TABLET ORAL NIGHTLY
Qty: 30 TABLET | Refills: 0 | Status: SHIPPED | OUTPATIENT
Start: 2023-08-09 | End: 2023-09-08

## 2023-08-09 NOTE — PROGRESS NOTES
"Subjective:      Patient ID: Marjorie Harkins is a 72 y.o. female.    Vitals:  height is 5' 7" (1.702 m) and weight is 72.1 kg (159 lb). Her temperature is 100.2 °F (37.9 °C). Her blood pressure is 177/97 (abnormal) and her pulse is 86. Her respiration is 20 and oxygen saturation is 98%.     Chief Complaint: Cough    This is a 72 y.o. female   who presents today with a chief complaint of cold symptoms that began two days ago. She's complaining of congestion, cough and fatigue. She's been taking tylenol and advil to help relieve her symptoms.     Cough  This is a new problem. The current episode started in the past 7 days. The problem has been gradually worsening. The problem occurs every few minutes. The cough is Non-productive. Pertinent negatives include no chest pain, chills, ear congestion, ear pain, fever, headaches, heartburn, hemoptysis, myalgias, nasal congestion, postnasal drip, rash, rhinorrhea, sore throat, shortness of breath, sweats, weight loss or wheezing. Nothing aggravates the symptoms. Treatments tried: tylenol and advil. The treatment provided mild relief.     Constitution: Positive for fatigue. Negative for chills and fever.   HENT:  Positive for congestion. Negative for ear pain, postnasal drip and sore throat.    Neck: neck negative.   Cardiovascular:  Negative for chest pain, palpitations and sob on exertion.   Respiratory:  Positive for cough. Negative for bloody sputum, shortness of breath and wheezing.    Gastrointestinal:  Positive for nausea. Negative for heartburn.   Musculoskeletal:  Negative for muscle ache.   Skin:  Negative for rash.   Neurological:  Negative for headaches.      Objective:     Physical Exam   HENT:   Head: Normocephalic.   Ears:   Right Ear: Tympanic membrane and ear canal normal.   Left Ear: Tympanic membrane and ear canal normal.   Nose: Nose normal.   Mouth/Throat: Mucous membranes are moist. Oropharynx is clear.   Neck: Neck supple.   Cardiovascular: Normal rate and " regular rhythm.   Pulmonary/Chest: Effort normal and breath sounds normal.   Abdominal: Normal appearance. flat abdomen   Musculoskeletal:      Cervical back: She exhibits no tenderness.   Lymphadenopathy:     She has no cervical adenopathy.   Neurological: She is alert.       Assessment:     1. Upper respiratory tract infection, unspecified type        Plan:     Results for orders placed or performed in visit on 08/09/23   SARS Coronavirus 2 Antigen, POCT Manual Read   Result Value Ref Range    SARS Coronavirus 2 Antigen Negative Negative     Acceptable Yes         Upper respiratory tract infection, unspecified type  -     SARS Coronavirus 2 Antigen, POCT Manual Read  -     montelukast (SINGULAIR) 10 mg tablet; Take 1 tablet (10 mg total) by mouth every evening.  Dispense: 30 tablet; Refill: 0      Patient Instructions   Increase fluids  May cont with tylenol/motrin prn  Instruct on medications

## 2023-08-14 ENCOUNTER — TELEPHONE (OUTPATIENT)
Dept: URGENT CARE | Facility: CLINIC | Age: 73
End: 2023-08-14
Payer: COMMERCIAL

## 2024-02-10 ENCOUNTER — HOSPITAL ENCOUNTER (INPATIENT)
Facility: OTHER | Age: 74
LOS: 5 days | Discharge: HOME | DRG: 389 | End: 2024-02-15
Attending: INTERNAL MEDICINE | Admitting: EMERGENCY MEDICINE
Payer: MEDICARE

## 2024-02-10 DIAGNOSIS — K56.609 SMALL BOWEL OBSTRUCTION: ICD-10-CM

## 2024-02-10 DIAGNOSIS — K56.609 SBO (SMALL BOWEL OBSTRUCTION): Primary | ICD-10-CM

## 2024-02-10 DIAGNOSIS — R07.9 CHEST PAIN: ICD-10-CM

## 2024-02-10 PROBLEM — I10 ESSENTIAL HYPERTENSION: Status: ACTIVE | Noted: 2024-02-10

## 2024-02-10 LAB
ALBUMIN SERPL BCP-MCNC: 3.4 G/DL (ref 3.5–5.2)
ALP SERPL-CCNC: 81 U/L (ref 55–135)
ALT SERPL W/O P-5'-P-CCNC: 30 U/L (ref 10–44)
ANION GAP SERPL CALC-SCNC: 9 MMOL/L (ref 8–16)
AST SERPL-CCNC: 30 U/L (ref 10–40)
BACTERIA #/AREA URNS HPF: ABNORMAL /HPF
BASOPHILS # BLD AUTO: 0.03 K/UL (ref 0–0.2)
BASOPHILS NFR BLD: 0.7 % (ref 0–1.9)
BILIRUB SERPL-MCNC: 0.7 MG/DL (ref 0.1–1)
BILIRUB UR QL STRIP: NEGATIVE
BUN SERPL-MCNC: 9 MG/DL (ref 8–23)
CALCIUM SERPL-MCNC: 8.3 MG/DL (ref 8.7–10.5)
CHLORIDE SERPL-SCNC: 105 MMOL/L (ref 95–110)
CLARITY UR: CLEAR
CO2 SERPL-SCNC: 26 MMOL/L (ref 23–29)
COLOR UR: COLORLESS
CREAT SERPL-MCNC: 0.7 MG/DL (ref 0.5–1.4)
CREAT SERPL-MCNC: 0.7 MG/DL (ref 0.5–1.4)
DIFFERENTIAL METHOD BLD: NORMAL
EOSINOPHIL # BLD AUTO: 0.1 K/UL (ref 0–0.5)
EOSINOPHIL NFR BLD: 2.5 % (ref 0–8)
ERYTHROCYTE [DISTWIDTH] IN BLOOD BY AUTOMATED COUNT: 12.5 % (ref 11.5–14.5)
EST. GFR  (NO RACE VARIABLE): >60 ML/MIN/1.73 M^2
GLUCOSE SERPL-MCNC: 95 MG/DL (ref 70–110)
GLUCOSE UR QL STRIP: NEGATIVE
HCT VFR BLD AUTO: 46.1 % (ref 37–48.5)
HGB BLD-MCNC: 15.6 G/DL (ref 12–16)
HGB UR QL STRIP: NEGATIVE
IMM GRANULOCYTES # BLD AUTO: 0.01 K/UL (ref 0–0.04)
IMM GRANULOCYTES NFR BLD AUTO: 0.2 % (ref 0–0.5)
KETONES UR QL STRIP: ABNORMAL
LEUKOCYTE ESTERASE UR QL STRIP: ABNORMAL
LIPASE SERPL-CCNC: 13 U/L (ref 4–60)
LYMPHOCYTES # BLD AUTO: 1.2 K/UL (ref 1–4.8)
LYMPHOCYTES NFR BLD: 29.1 % (ref 18–48)
MCH RBC QN AUTO: 30.4 PG (ref 27–31)
MCHC RBC AUTO-ENTMCNC: 33.8 G/DL (ref 32–36)
MCV RBC AUTO: 90 FL (ref 82–98)
MICROSCOPIC COMMENT: ABNORMAL
MONOCYTES # BLD AUTO: 0.5 K/UL (ref 0.3–1)
MONOCYTES NFR BLD: 13.1 % (ref 4–15)
NEUTROPHILS # BLD AUTO: 2.2 K/UL (ref 1.8–7.7)
NEUTROPHILS NFR BLD: 54.4 % (ref 38–73)
NITRITE UR QL STRIP: POSITIVE
NRBC BLD-RTO: 0 /100 WBC
PH UR STRIP: 6 [PH] (ref 5–8)
PLATELET # BLD AUTO: 152 K/UL (ref 150–450)
PMV BLD AUTO: 11.2 FL (ref 9.2–12.9)
POTASSIUM SERPL-SCNC: 3.7 MMOL/L (ref 3.5–5.1)
PROT SERPL-MCNC: 5.7 G/DL (ref 6–8.4)
PROT UR QL STRIP: NEGATIVE
RBC # BLD AUTO: 5.14 M/UL (ref 4–5.4)
RBC #/AREA URNS HPF: 1 /HPF (ref 0–4)
SAMPLE: NORMAL
SODIUM SERPL-SCNC: 140 MMOL/L (ref 136–145)
SP GR UR STRIP: 1 (ref 1–1.03)
URN SPEC COLLECT METH UR: ABNORMAL
UROBILINOGEN UR STRIP-ACNC: NEGATIVE EU/DL
WBC # BLD AUTO: 4.05 K/UL (ref 3.9–12.7)
WBC #/AREA URNS HPF: 7 /HPF (ref 0–5)

## 2024-02-10 PROCEDURE — 99285 EMERGENCY DEPT VISIT HI MDM: CPT | Mod: 25

## 2024-02-10 PROCEDURE — 96374 THER/PROPH/DIAG INJ IV PUSH: CPT

## 2024-02-10 PROCEDURE — 12000002 HC ACUTE/MED SURGE SEMI-PRIVATE ROOM

## 2024-02-10 PROCEDURE — 80053 COMPREHEN METABOLIC PANEL: CPT

## 2024-02-10 PROCEDURE — 96361 HYDRATE IV INFUSION ADD-ON: CPT

## 2024-02-10 PROCEDURE — 63600175 PHARM REV CODE 636 W HCPCS: Performed by: HOSPITALIST

## 2024-02-10 PROCEDURE — 25000003 PHARM REV CODE 250

## 2024-02-10 PROCEDURE — 83690 ASSAY OF LIPASE: CPT

## 2024-02-10 PROCEDURE — 63600175 PHARM REV CODE 636 W HCPCS

## 2024-02-10 PROCEDURE — 85025 COMPLETE CBC W/AUTO DIFF WBC: CPT

## 2024-02-10 PROCEDURE — 81000 URINALYSIS NONAUTO W/SCOPE: CPT

## 2024-02-10 PROCEDURE — 25000003 PHARM REV CODE 250: Performed by: HOSPITALIST

## 2024-02-10 PROCEDURE — 25500020 PHARM REV CODE 255

## 2024-02-10 PROCEDURE — 96375 TX/PRO/DX INJ NEW DRUG ADDON: CPT

## 2024-02-10 RX ORDER — HYDRALAZINE HYDROCHLORIDE 20 MG/ML
10 INJECTION INTRAMUSCULAR; INTRAVENOUS EVERY 6 HOURS PRN
Status: DISCONTINUED | OUTPATIENT
Start: 2024-02-10 | End: 2024-02-14

## 2024-02-10 RX ORDER — GLUCAGON 1 MG
1 KIT INJECTION
Status: DISCONTINUED | OUTPATIENT
Start: 2024-02-10 | End: 2024-02-15 | Stop reason: HOSPADM

## 2024-02-10 RX ORDER — DICYCLOMINE HYDROCHLORIDE 10 MG/1
20 CAPSULE ORAL
Status: COMPLETED | OUTPATIENT
Start: 2024-02-10 | End: 2024-02-10

## 2024-02-10 RX ORDER — KETOROLAC TROMETHAMINE 30 MG/ML
15 INJECTION, SOLUTION INTRAMUSCULAR; INTRAVENOUS EVERY 6 HOURS PRN
Status: DISPENSED | OUTPATIENT
Start: 2024-02-10 | End: 2024-02-12

## 2024-02-10 RX ORDER — PROCHLORPERAZINE EDISYLATE 5 MG/ML
5 INJECTION INTRAMUSCULAR; INTRAVENOUS EVERY 6 HOURS PRN
Status: DISCONTINUED | OUTPATIENT
Start: 2024-02-10 | End: 2024-02-15 | Stop reason: HOSPADM

## 2024-02-10 RX ORDER — MORPHINE SULFATE 2 MG/ML
2 INJECTION, SOLUTION INTRAMUSCULAR; INTRAVENOUS EVERY 4 HOURS PRN
Status: DISCONTINUED | OUTPATIENT
Start: 2024-02-10 | End: 2024-02-15 | Stop reason: HOSPADM

## 2024-02-10 RX ORDER — CLONAZEPAM 0.5 MG/1
0.5 TABLET ORAL NIGHTLY PRN
Status: DISCONTINUED | OUTPATIENT
Start: 2024-02-10 | End: 2024-02-15 | Stop reason: HOSPADM

## 2024-02-10 RX ORDER — KETOROLAC TROMETHAMINE 30 MG/ML
15 INJECTION, SOLUTION INTRAMUSCULAR; INTRAVENOUS
Status: COMPLETED | OUTPATIENT
Start: 2024-02-10 | End: 2024-02-10

## 2024-02-10 RX ORDER — SODIUM CHLORIDE, SODIUM LACTATE, POTASSIUM CHLORIDE, CALCIUM CHLORIDE 600; 310; 30; 20 MG/100ML; MG/100ML; MG/100ML; MG/100ML
INJECTION, SOLUTION INTRAVENOUS CONTINUOUS
Status: DISCONTINUED | OUTPATIENT
Start: 2024-02-10 | End: 2024-02-13

## 2024-02-10 RX ORDER — LOSARTAN POTASSIUM 50 MG/1
50 TABLET ORAL DAILY
Status: DISCONTINUED | OUTPATIENT
Start: 2024-02-11 | End: 2024-02-13

## 2024-02-10 RX ORDER — LOSARTAN POTASSIUM 50 MG/1
50 TABLET ORAL DAILY
Status: DISCONTINUED | OUTPATIENT
Start: 2024-02-10 | End: 2024-02-10

## 2024-02-10 RX ORDER — IBUPROFEN 200 MG
24 TABLET ORAL
Status: DISCONTINUED | OUTPATIENT
Start: 2024-02-10 | End: 2024-02-15 | Stop reason: HOSPADM

## 2024-02-10 RX ORDER — ONDANSETRON HYDROCHLORIDE 2 MG/ML
4 INJECTION, SOLUTION INTRAVENOUS
Status: COMPLETED | OUTPATIENT
Start: 2024-02-10 | End: 2024-02-10

## 2024-02-10 RX ORDER — ENALAPRILAT 1.25 MG/ML
0.62 INJECTION INTRAVENOUS ONCE
Status: COMPLETED | OUTPATIENT
Start: 2024-02-10 | End: 2024-02-10

## 2024-02-10 RX ORDER — IBUPROFEN 200 MG
16 TABLET ORAL
Status: DISCONTINUED | OUTPATIENT
Start: 2024-02-10 | End: 2024-02-15 | Stop reason: HOSPADM

## 2024-02-10 RX ORDER — ONDANSETRON HYDROCHLORIDE 2 MG/ML
4 INJECTION, SOLUTION INTRAVENOUS EVERY 8 HOURS PRN
Status: DISCONTINUED | OUTPATIENT
Start: 2024-02-10 | End: 2024-02-15 | Stop reason: HOSPADM

## 2024-02-10 RX ORDER — ENOXAPARIN SODIUM 100 MG/ML
40 INJECTION SUBCUTANEOUS EVERY 24 HOURS
Status: DISCONTINUED | OUTPATIENT
Start: 2024-02-10 | End: 2024-02-15 | Stop reason: HOSPADM

## 2024-02-10 RX ORDER — ACETAMINOPHEN 325 MG/1
650 TABLET ORAL EVERY 4 HOURS PRN
Status: DISCONTINUED | OUTPATIENT
Start: 2024-02-10 | End: 2024-02-15 | Stop reason: HOSPADM

## 2024-02-10 RX ORDER — KETOROLAC TROMETHAMINE 30 MG/ML
15 INJECTION, SOLUTION INTRAMUSCULAR; INTRAVENOUS EVERY 6 HOURS PRN
Status: DISCONTINUED | OUTPATIENT
Start: 2024-02-10 | End: 2024-02-10

## 2024-02-10 RX ADMIN — DICYCLOMINE HYDROCHLORIDE 20 MG: 10 CAPSULE ORAL at 10:02

## 2024-02-10 RX ADMIN — ENALAPRILAT 0.62 MG: 1.25 INJECTION INTRAVENOUS at 05:02

## 2024-02-10 RX ADMIN — ENOXAPARIN SODIUM 40 MG: 40 INJECTION SUBCUTANEOUS at 05:02

## 2024-02-10 RX ADMIN — IOHEXOL 75 ML: 350 INJECTION, SOLUTION INTRAVENOUS at 12:02

## 2024-02-10 RX ADMIN — KETOROLAC TROMETHAMINE 15 MG: 30 INJECTION, SOLUTION INTRAMUSCULAR at 09:02

## 2024-02-10 RX ADMIN — SODIUM CHLORIDE 1000 ML: 9 INJECTION, SOLUTION INTRAVENOUS at 02:02

## 2024-02-10 RX ADMIN — ONDANSETRON 4 MG: 2 INJECTION INTRAMUSCULAR; INTRAVENOUS at 10:02

## 2024-02-10 RX ADMIN — KETOROLAC TROMETHAMINE 15 MG: 30 INJECTION, SOLUTION INTRAMUSCULAR; INTRAVENOUS at 02:02

## 2024-02-10 RX ADMIN — SODIUM CHLORIDE 1000 ML: 0.9 INJECTION, SOLUTION INTRAVENOUS at 10:02

## 2024-02-10 RX ADMIN — SODIUM CHLORIDE, POTASSIUM CHLORIDE, SODIUM LACTATE AND CALCIUM CHLORIDE: 600; 310; 30; 20 INJECTION, SOLUTION INTRAVENOUS at 03:02

## 2024-02-10 RX ADMIN — HYDRALAZINE HYDROCHLORIDE 10 MG: 20 INJECTION INTRAMUSCULAR; INTRAVENOUS at 03:02

## 2024-02-10 RX ADMIN — MORPHINE SULFATE 2 MG: 2 INJECTION, SOLUTION INTRAMUSCULAR; INTRAVENOUS at 04:02

## 2024-02-10 NOTE — ASSESSMENT & PLAN NOTE
"H/o small bowel obstruction  - Patient with abdominal pain with previous h/o SBO x 2 in the past and CT with noted "dilatation of fluid-filled small bowel loops throughout the abdomen, with a questioned transition point to decompressed distal small bowel in the right lower quadrant." Symptoms ongoing for 1 month but acutely worse the past 5 days with N/V and recent negative colonoscopy 2 months ago but upon review of Care Everywhere, the procedure was on 10/10/23 and with removal of 1 hyperplastic polyp.  - Conservative treatment with bowel rest, pain control and supportive care  - Start IV fluids with LR   - Consult placed to General surgery, Dr. Marquez  - Will avoid NG tube for now given patient is currently not that symptomatic and she reports that it did not help her much in the past and wishes to avoid NG if at all possible  - Monitor with labs and correct electrolytes as needed  "

## 2024-02-10 NOTE — ED PROVIDER NOTES
Encounter Date: 2/10/2024       History     Chief Complaint   Patient presents with    Abdominal Pain     Pt states that she started to have generalized abdominal pain with nausea, vomiting and diarrhea since Tuesday of last week.     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  Patient states that 5 days ago she woke up from asleep with significant abdominal cramping followed by multiple episodes of projectile vomiting for 4 hours.  She reports that she has not had any vomiting since then, but has had persistent nausea and diarrhea.  She describes the pain as aching and worsens with different movement.  She took Advil x1, but has not taken other medication for her symptoms. Surgical history significant for cholecystectomy, appendectomy, hysterectomy.  No known sick contacts.  Denies fever, chills, chest pain, shortness of breath, hematochezia, hematemesis, urinary symptoms.    The history is provided by the patient.     Review of patient's allergies indicates:  No Known Allergies  Past Medical History:   Diagnosis Date    Deep vein thrombosis (DVT) 2002    Factor V deficiency     Hypertension      Past Surgical History:   Procedure Laterality Date    APPENDECTOMY      HYSTERECTOMY      LAPAROSCOPIC CHOLECYSTECTOMY N/A 12/22/2020    Procedure: CHOLECYSTECTOMY, LAPAROSCOPIC;  Surgeon: Garry Marquez Jr., MD;  Location: [facility];  Service: General;  Laterality: N/A;    TONSILLECTOMY       Family History   Problem Relation Age of Onset    No Known Problems Mother     Heart disease Father      Social History     Tobacco Use    Smoking status: Never    Smokeless tobacco: Never   Substance Use Topics    Alcohol use: Yes     Alcohol/week: 4.0 standard drinks of alcohol     Types: 4 Glasses of wine per week     Comment: rarely    Drug use: No     Review of Systems  As per  HPI above  Physical Exam     Initial Vitals [02/10/24 0946]   BP Pulse Resp Temp SpO2   (!) 184/94 67 18 98.2 °F (36.8 °C) 99 %      MAP       --         Physical Exam    Constitutional: She appears well-developed and well-nourished.  Non-toxic appearance. She does not appear ill. No distress.   HENT:   Head: Normocephalic and atraumatic.   Eyes: Conjunctivae are normal.   Neck: Neck supple.   Cardiovascular:  Normal rate and regular rhythm.           Pulmonary/Chest: Effort normal. No tachypnea. No respiratory distress.   Abdominal: Abdomen is soft and flat. There is abdominal tenderness in the periumbilical area. There is no rebound, no guarding and negative Blake's sign. negative Rovsing's sign  Musculoskeletal:      Cervical back: Neck supple.     Neurological: She is alert and oriented to person, place, and time.   Skin: Skin is warm, dry and intact.   Psychiatric: She has a normal mood and affect. Her speech is normal and behavior is normal.         ED Course   Procedures  Labs Reviewed   URINALYSIS, REFLEX TO URINE CULTURE - Abnormal; Notable for the following components:       Result Value    Color, UA Colorless (*)     Ketones, UA 1+ (*)     Nitrite, UA Positive (*)     Leukocytes, UA Trace (*)     All other components within normal limits    Narrative:     Specimen Source->Urine   COMPREHENSIVE METABOLIC PANEL - Abnormal; Notable for the following components:    Calcium 8.3 (*)     Total Protein 5.7 (*)     Albumin 3.4 (*)     All other components within normal limits   URINALYSIS MICROSCOPIC - Abnormal; Notable for the following components:    WBC, UA 7 (*)     Bacteria Few (*)     All other components within normal limits    Narrative:     Specimen Source->Urine   CBC W/ AUTO DIFFERENTIAL   LIPASE   ISTAT CREATININE          Imaging Results               CT Abdomen Pelvis With IV Contrast NO Oral Contrast (Final result)  Result time 02/10/24 13:08:44      Final result by Timur Lang MD (02/10/24  13:08:44)                   Impression:      1. Findings in keeping with small bowel obstruction, with questioned transition point in the right lower quadrant of the abdomen.  2. Additional details of chronic and incidental findings, as provided in the body of the report.  This report was flagged in Epic as abnormal.      Electronically signed by: Timur Lang  Date:    02/10/2024  Time:    13:08               Narrative:    EXAMINATION:  CT ABDOMEN PELVIS WITH IV CONTRAST    CLINICAL HISTORY:  Nausea/vomiting;Abdominal pain, acute, nonlocalized;    TECHNIQUE:  Low dose axial images, sagittal and coronal reformations were obtained from the lung bases to the pubic symphysis following the IV administration of 75 mL of Omnipaque 350    COMPARISON:  MRI of the abdomen performed 12/23/2020.    FINDINGS:  Lower chest: Atelectasis and scar at the visualized lung bases.    Liver: Normal contour.  Subcentimeter hypodense lesions are too small to definitely characterize.    Gallbladder and bile ducts: Status post cholecystectomy.  Mildly prominent caliber bile ducts may reflect post cholecystectomy status.    Pancreas: Unremarkable.    Spleen: Unremarkable.    Adrenals: Unremarkable.    Kidneys: Unremarkable.    Lymph nodes: No abdominal or pelvic lymphadenopathy.    Bowel and mesentery: Small hiatal hernia.  Moderate colonic diverticulosis.  Moderate volume colonic stool.There is dilatation of fluid-filled small bowel loops throughout the abdomen, with a questioned transition point to decompressed distal small bowel in the right lower quadrant (axial series 2, image 131; coronal reformat series 601, image 50).  No definite pneumatosis or pneumoperitoneum.  Relative least small volume free fluid in the abdomen pelvis, possibly reactive.    Appendix not confidently identified.  Noting this, no definite focal pericecal inflammation.  Areas of submucosal fat attenuation involving the region the cecum and proximal colon could  reflect sequela of chronic nonspecific inflammation.    Abdominal aorta: Nonaneurysmal.  Mild atherosclerotic calcification.    Inferior vena cava: Infrarenal IVC filter in-situ.    Free fluid or free air: As above.    Pelvis: Unremarkable.    Body wall: Remote operative sequela suggested in the anterior abdominal wall.    Bones: No definite acute abnormality.  Relatively modest degenerative findings in the spine.                                       Medications   sodium chloride 0.9% bolus 1,000 mL 1,000 mL (1,000 mLs Intravenous New Bag 2/10/24 1426)   sodium chloride 0.9% bolus 1,000 mL 1,000 mL (0 mLs Intravenous Stopped 2/10/24 1130)   dicyclomine capsule 20 mg (20 mg Oral Given 2/10/24 1033)   ondansetron injection 4 mg (4 mg Intravenous Given 2/10/24 1030)   iohexoL (OMNIPAQUE 350) injection 75 mL (75 mLs Intravenous Given 2/10/24 1241)   ketorolac injection 15 mg (15 mg Intravenous Given 2/10/24 1427)     Medical Decision Making  Urgent evaluation of an afebrile 73-year-old female with generalized abdominal pain and nausea with resolved vomiting.  Patient is s/p cholecystectomy and appendectomy.  Physical exam reveals a well-appearing hemodynamically stable female with periumbilical abdominal tenderness to palpation.  Will attempt to collect stool sample.  Plan for supportive care with fluids, Bentyl, and antiemetics.  Will obtain basic labs, imaging, and reassess.    Differential diagnosis includes but is not limited to:  Viral gastroenteritis, diverticulitis, COVID, flu    Amount and/or Complexity of Data Reviewed  Labs: ordered.  Radiology: ordered. Decision-making details documented in ED Course.    Risk  Prescription drug management.  Decision regarding hospitalization.               ED Course as of 02/10/24 1446   Sat Feb 10, 2024   1129 CBC without leukocytosis or anemia [JOE]   1237 CMP without electrolyte abnormalities or renal insufficiency.  Normal liver enzymes.  Lipase normal. [JOE]   1328 CT  Abdomen Pelvis With IV Contrast NO Oral Contrast(!)  Findings in keeping with small bowel obstruction, with questioned transition point in the right lower quadrant of the abdomen. [JOE]   1415 Discussed case with Dr. Marquez with General surgery who states that if pain is not well controlled, can place an NG tube.  Patient adamantly declined NG tube at this time and states that her pain is not severe.  She is resting comfortably.  Plan to admit to hospital medicine for further management of her small-bowel obstruction. [JOE]   1431 Dr. Lynne with hospital medicine has accepted patient to Dr. Lino's service. Patient updated and agreeable with plan [JOE]      ED Course User Index  [JOE] Shannan Jennings PA-C                       Clinical Impression:  Final diagnoses:  [K56.609] Small bowel obstruction          ED Disposition Condition    Admit                 Shannan Jennings PA-C  02/10/24 1084

## 2024-02-10 NOTE — SUBJECTIVE & OBJECTIVE
Past Medical History:   Diagnosis Date    Deep vein thrombosis (DVT) 2002    Factor V deficiency     Hx SBO     Hypertension        Past Surgical History:   Procedure Laterality Date    APPENDECTOMY      HYSTERECTOMY      LAPAROSCOPIC CHOLECYSTECTOMY N/A 12/22/2020    Procedure: CHOLECYSTECTOMY, LAPAROSCOPIC;  Surgeon: Garry Marquez Jr., MD;  Location: [facility];  Service: General;  Laterality: N/A;    TONSILLECTOMY         Review of patient's allergies indicates:  No Known Allergies    No current facility-administered medications on file prior to encounter.     Current Outpatient Medications on File Prior to Encounter   Medication Sig    acyclovir 5% (ZOVIRAX) 5 % ointment Apply thin layer to affected area    clonazePAM (KLONOPIN) 0.5 MG tablet Take 0.5 mg by mouth nightly as needed.    fluticasone propionate (FLONASE) 50 mcg/actuation nasal spray 2 sprays (100 mcg total) by Each Nostril route daily as needed (nasal congestion). (Patient not taking: Reported on 8/9/2023)    losartan (COZAAR) 50 MG tablet Take 50 mg by mouth.     Family History       Problem Relation (Age of Onset)    Heart disease Father    No Known Problems Mother          Tobacco Use    Smoking status: Never    Smokeless tobacco: Never   Substance and Sexual Activity    Alcohol use: Yes     Alcohol/week: 4.0 standard drinks of alcohol     Types: 4 Glasses of wine per week     Comment: rarely    Drug use: No    Sexual activity: Not Currently     Review of Systems   Constitutional:  Negative for chills and fever.   HENT:  Negative for sore throat.    Eyes:  Negative for visual disturbance.   Respiratory:  Negative for shortness of breath.    Cardiovascular:  Negative for chest pain and leg swelling.   Gastrointestinal:  Positive for abdominal pain, constipation, diarrhea, nausea and vomiting. Negative for blood in stool.   Endocrine: Negative for polyuria.   Genitourinary:  Negative for dysuria, frequency and hematuria.   Musculoskeletal:  Negative  for back pain.   Skin:  Positive for wound. Negative for rash.   Neurological:  Negative for syncope, light-headedness and headaches.   Psychiatric/Behavioral:  Negative for confusion.      Objective:     Vital Signs (Most Recent):  Temp: 98.2 °F (36.8 °C) (02/10/24 0946)  Pulse: 67 (02/10/24 0946)  Resp: 18 (02/10/24 0946)  BP: (!) 184/94 (02/10/24 0946)  SpO2: 99 % (02/10/24 0946) Vital Signs (24h Range):  Temp:  [98.2 °F (36.8 °C)] 98.2 °F (36.8 °C)  Pulse:  [67] 67  Resp:  [18] 18  SpO2:  [99 %] 99 %  BP: (184)/(94) 184/94     Weight: 72.1 kg (159 lb)  Body mass index is 24.9 kg/m².     Physical Exam  Vitals and nursing note reviewed.   HENT:      Head: Normocephalic and atraumatic.      Nose: Nose normal.   Eyes:      Extraocular Movements: Extraocular movements intact.      Conjunctiva/sclera: Conjunctivae normal.   Cardiovascular:      Rate and Rhythm: Normal rate and regular rhythm.   Pulmonary:      Effort: Pulmonary effort is normal.      Breath sounds: Normal breath sounds.   Abdominal:      Palpations: Abdomen is soft.      Tenderness: There is abdominal tenderness. There is no guarding or rebound.      Comments: Tenderness to palpation periumbilically and bilateral lower quadrants; hypoactive bowel sounds; healed surgical abdominal scars   Musculoskeletal:      Cervical back: Normal range of motion.   Skin:     General: Skin is warm and dry.      Capillary Refill: Capillary refill takes less than 2 seconds.      Comments: Nicely healing midline vertical forehead scar (recent resection of basal cell 2 weeks ago)   Neurological:      Mental Status: She is alert and oriented to person, place, and time. Mental status is at baseline.      Comments: Able to given full and complete history, [employer] sister at the bedside   Psychiatric:         Mood and Affect: Mood normal.         Behavior: Behavior normal.         Thought Content: Thought content normal.                Significant Labs: All pertinent labs  within the past 24 hours have been reviewed.    Significant Imaging: I have reviewed all pertinent imaging results/findings within the past 24 hours.

## 2024-02-10 NOTE — ASSESSMENT & PLAN NOTE
Factor V Leiden deficiency  S/p IVC filter  - Patient not on anticoagulation and has been treated with IVC filter a long time ago  - No reported recent issues  - Lovenox for prophylaxis

## 2024-02-10 NOTE — H&P
[facility] Emergency [facility] Medicine  History & Physical    Patient Name: Marjorie Harikns  MRN: [id number]  Patient Class: IP- Inpatient  Admission Date: 2/10/2024  Attending Physician: Yony Lino MD   Primary Care Provider: JAQUI Chatman Jr. (Inactive)         Patient information was obtained from patient, caregiver / friend, past medical records, and ER records.     Subjective:     Principal Problem:SBO (small bowel obstruction)    Chief Complaint:   Chief Complaint   Patient presents with    Abdominal Pain     Pt states that she started to have generalized abdominal pain with nausea, vomiting and diarrhea since Tuesday of last week.        HPI: 74 y/o female with HTN, Factor V deficiency, and h/o DVT s/p IVC filter and SBO x 2 who presented with abdominal pain with N/V x 5 days.  Patient reported symptoms started proximally 1 month ago when she was having constipation only relieved with straining and Dulcolax suppository which is not her normal.  Then this past Tuesday, she had projectile vomiting for approximately 4 hours of undigested food and bilious material, with subsequent abdominal pain and cramping that has been intermittent and ongoing since Tuesday.  She had diarrhea stools yesterday, and her last food intake was some bread and with butter around 9:00 p.m.  Abdominal pain described as constant with intermittent worsening, diffuse, but mainly located in the lower quadrants; she could not identify any aggravating or relieving factors, but stated it felt like the pain she had with her previous 2 bouts with bowel obstruction.  Denies hematemesis or bloody stools.  Denies fevers or chills.  Multiple abdominal surgeries including cholecystectomy, appendectomy, and hysterectomy; and was told that she would likely have problems with obstruction again.  Her 1st bowel obstruction was in 1995, and subsequent episode was approximately 10 years afterwards.  She also reports she had a recent colonoscopy,  "approximately 2 months ago, which was reportedly unremarkable.  In the ER, WBC count was 4.05 with normal H&H and platelets.  CMP remarkable for albumin 3.4, calcium 8.3 (corrected is 8.78); UA with 1+ ketones, positive nitrites, trace leukocyte esterase, 7 WBC and 1 RBC.  Imaging with CT scan of the abdomen pelvis "dilatation of fluid-filled small bowel loops throughout the abdomen, with a questioned transition point to decompressed distal small bowel in the right lower quadrant."    Past Medical History:   Diagnosis Date    Deep vein thrombosis (DVT) 2002    Factor V deficiency     Hx SBO     Hypertension        Past Surgical History:   Procedure Laterality Date    APPENDECTOMY      HYSTERECTOMY      LAPAROSCOPIC CHOLECYSTECTOMY N/A 12/22/2020    Procedure: CHOLECYSTECTOMY, LAPAROSCOPIC;  Surgeon: Garry Marquez Jr., MD;  Location: [facility];  Service: General;  Laterality: N/A;    TONSILLECTOMY         Review of patient's allergies indicates:  No Known Allergies    No current facility-administered medications on file prior to encounter.     Current Outpatient Medications on File Prior to Encounter   Medication Sig    acyclovir 5% (ZOVIRAX) 5 % ointment Apply thin layer to affected area    clonazePAM (KLONOPIN) 0.5 MG tablet Take 0.5 mg by mouth nightly as needed.    fluticasone propionate (FLONASE) 50 mcg/actuation nasal spray 2 sprays (100 mcg total) by Each Nostril route daily as needed (nasal congestion). (Patient not taking: Reported on 8/9/2023)    losartan (COZAAR) 50 MG tablet Take 50 mg by mouth.     Family History       Problem Relation (Age of Onset)    Heart disease Father    No Known Problems Mother          Tobacco Use    Smoking status: Never    Smokeless tobacco: Never   Substance and Sexual Activity    Alcohol use: Yes     Alcohol/week: 4.0 standard drinks of alcohol     Types: 4 Glasses of wine per week     Comment: rarely    Drug use: No    Sexual activity: Not Currently     Review of Systems "   Constitutional:  Negative for chills and fever.   HENT:  Negative for sore throat.    Eyes:  Negative for visual disturbance.   Respiratory:  Negative for shortness of breath.    Cardiovascular:  Negative for chest pain and leg swelling.   Gastrointestinal:  Positive for abdominal pain, constipation, diarrhea, nausea and vomiting. Negative for blood in stool.   Endocrine: Negative for polyuria.   Genitourinary:  Negative for dysuria, frequency and hematuria.   Musculoskeletal:  Negative for back pain.   Skin:  Positive for wound. Negative for rash.   Neurological:  Negative for syncope, light-headedness and headaches.   Psychiatric/Behavioral:  Negative for confusion.      Objective:     Vital Signs (Most Recent):  Temp: 98.2 °F (36.8 °C) (02/10/24 0946)  Pulse: 67 (02/10/24 0946)  Resp: 18 (02/10/24 0946)  BP: (!) 184/94 (02/10/24 0946)  SpO2: 99 % (02/10/24 0946) Vital Signs (24h Range):  Temp:  [98.2 °F (36.8 °C)] 98.2 °F (36.8 °C)  Pulse:  [67] 67  Resp:  [18] 18  SpO2:  [99 %] 99 %  BP: (184)/(94) 184/94     Weight: 72.1 kg (159 lb)  Body mass index is 24.9 kg/m².     Physical Exam  Vitals and nursing note reviewed.   HENT:      Head: Normocephalic and atraumatic.      Nose: Nose normal.   Eyes:      Extraocular Movements: Extraocular movements intact.      Conjunctiva/sclera: Conjunctivae normal.   Cardiovascular:      Rate and Rhythm: Normal rate and regular rhythm.   Pulmonary:      Effort: Pulmonary effort is normal.      Breath sounds: Normal breath sounds.   Abdominal:      Palpations: Abdomen is soft.      Tenderness: There is abdominal tenderness. There is no guarding or rebound.      Comments: Tenderness to palpation periumbilically and bilateral lower quadrants; hypoactive bowel sounds; healed surgical abdominal scars   Musculoskeletal:      Cervical back: Normal range of motion.   Skin:     General: Skin is warm and dry.      Capillary Refill: Capillary refill takes less than 2 seconds.       "Comments: Nicely healing midline vertical forehead scar (recent resection of basal cell 2 weeks ago)   Neurological:      Mental Status: She is alert and oriented to person, place, and time. Mental status is at baseline.      Comments: Able to given full and complete history, [employer] sister at the bedside   Psychiatric:         Mood and Affect: Mood normal.         Behavior: Behavior normal.         Thought Content: Thought content normal.                Significant Labs: All pertinent labs within the past 24 hours have been reviewed.    Significant Imaging: I have reviewed all pertinent imaging results/findings within the past 24 hours.  Assessment/Plan:     * SBO (small bowel obstruction)  H/o small bowel obstruction  - Patient with abdominal pain with previous h/o SBO x 2 in the past and CT with noted "dilatation of fluid-filled small bowel loops throughout the abdomen, with a questioned transition point to decompressed distal small bowel in the right lower quadrant." Symptoms ongoing for 1 month but acutely worse the past 5 days with N/V and recent negative colonoscopy 2 months ago but upon review of Care Everywhere, the procedure was on 10/10/23 and with removal of 1 hyperplastic polyp.  - Conservative treatment with bowel rest, pain control and supportive care  - Start IV fluids with LR   - Consult placed to General surgery, Dr. Marquez  - Will avoid NG tube for now given patient is currently not that symptomatic and she reports that it did not help her much in the past and wishes to avoid NG if at all possible  - Monitor with labs and correct electrolytes as needed    Essential hypertension  - Continue losartan 50 mg p.o. daily, if able to tolerate p.o. medication  - Hydralazine IV p.r.n. with parameters    History of DVT (deep vein thrombosis)  Factor V Leiden deficiency  S/p IVC filter  - Patient not on anticoagulation and has been treated with IVC filter a long time ago  - No reported recent issues  - " Lovenox for prophylaxis       VTE Risk Mitigation (From admission, onward)           Ordered     enoxaparin injection 40 mg  Daily         02/10/24 1452     IP VTE HIGH RISK PATIENT  Once         02/10/24 1452     Place sequential compression device  Until discontinued         02/10/24 1452                         Slime Lynne MD  Department of Hospital Medicine  [facility] - Emergency Dept

## 2024-02-10 NOTE — HPI
"72 y/o female with HTN, Factor V deficiency, and h/o DVT s/p IVC filter and SBO x 2 who presented with abdominal pain with N/V x 5 days.  Patient reported symptoms started proximally 1 month ago when she was having constipation only relieved with straining and Dulcolax suppository which is not her normal.  Then this past Tuesday, she had projectile vomiting for approximately 4 hours of undigested food and bilious material, with subsequent abdominal pain and cramping that has been intermittent and ongoing since Tuesday.  She had diarrhea stools yesterday, and her last food intake was some bread and with butter around 9:00 p.m.  Abdominal pain described as constant with intermittent worsening, diffuse, but mainly located in the lower quadrants; she could not identify any aggravating or relieving factors, but stated it felt like the pain she had with her previous 2 bouts with bowel obstruction.  Denies hematemesis or bloody stools.  Denies fevers or chills.  Multiple abdominal surgeries including cholecystectomy, appendectomy, and hysterectomy; and was told that she would likely have problems with obstruction again.  Her 1st bowel obstruction was in 1995, and subsequent episode was approximately 10 years afterwards.  She also reports she had a recent colonoscopy, approximately 2 months ago, which was reportedly unremarkable.  In the ER, WBC count was 4.05 with normal H&H and platelets.  CMP remarkable for albumin 3.4, calcium 8.3 (corrected is 8.78); UA with 1+ ketones, positive nitrites, trace leukocyte esterase, 7 WBC and 1 RBC.  Imaging with CT scan of the abdomen pelvis "dilatation of fluid-filled small bowel loops throughout the abdomen, with a questioned transition point to decompressed distal small bowel in the right lower quadrant."  "

## 2024-02-10 NOTE — ED TRIAGE NOTES
Pt presents to ED c/o generalized abd pain, N/V/D onset last Tuesday. Pt reports she has not vomited since last Thursday but endorses persistent nausea. Denies fevers, urinary s/s.

## 2024-02-10 NOTE — ASSESSMENT & PLAN NOTE
- Continue losartan 50 mg p.o. daily, if able to tolerate p.o. medication  - Hydralazine IV p.r.n. with parameters

## 2024-02-10 NOTE — CONSULTS
[facility] - Emergency Dept  Consult Note      Date of Consultation:2/10/2024    Reason for Consult:  Small-bowel obstruction  SUBJECTIVE:     History of Present Illness:  73-year-old female with a history of abdominal pain, nausea and vomiting for the last 5 days.  Patient has had constipation for the last month.  The patient's past surgical history is significant for appendectomy as a child through a lower midline incision.  In addition the patient had a hysterectomy for endometriosis and a subsequent bowel obstruction.  The patient has a Henderson filter.  The patient had a normal colonoscopy 2 months ago.  Patient's past medical history is significant for factor 5 Leiden deficiency, hypertension, and deep venous thrombosis.    Past Medical History:   Diagnosis Date    Deep vein thrombosis (DVT) 2002    Factor V deficiency     Hx SBO     Hypertension      Past Surgical History:   Procedure Laterality Date    APPENDECTOMY      HYSTERECTOMY      LAPAROSCOPIC CHOLECYSTECTOMY N/A 12/22/2020    Procedure: CHOLECYSTECTOMY, LAPAROSCOPIC;  Surgeon: Garry Marquez Jr., MD;  Location: [facility];  Service: General;  Laterality: N/A;    TONSILLECTOMY       Family History   Problem Relation Age of Onset    No Known Problems Mother     Heart disease Father      Social History     Tobacco Use    Smoking status: Never    Smokeless tobacco: Never   Substance Use Topics    Alcohol use: Yes     Alcohol/week: 4.0 standard drinks of alcohol     Types: 4 Glasses of wine per week     Comment: rarely    Drug use: No       Current Facility-Administered Medications:     acetaminophen tablet 650 mg, 650 mg, Oral, Q4H PRN, Slime Lnyne MD    clonazePAM tablet 0.5 mg, 0.5 mg, Oral, Nightly PRN, Slime Lynne MD    dextrose 10% bolus 125 mL 125 mL, 12.5 g, Intravenous, PRN, Slime Lynne MD    dextrose 10% bolus 250 mL 250 mL, 25 g, Intravenous, PRN, Slime Lynne MD    enoxaparin injection 40 mg, 40 mg, Subcutaneous, Daily, Slime Lynne  MD    glucagon (human recombinant) injection 1 mg, 1 mg, Intramuscular, PRN, Slime Lynne MD    glucose chewable tablet 16 g, 16 g, Oral, PRN, Slime Lynne MD    glucose chewable tablet 24 g, 24 g, Oral, PRN, Slime Lynne MD    hydrALAZINE injection 10 mg, 10 mg, Intravenous, Q6H PRN, Slime Lynne MD, 10 mg at 02/10/24 1544    ketorolac injection 15 mg, 15 mg, Intravenous, Q6H PRN, Slime Lynne MD    lactated ringers infusion, , Intravenous, Continuous, Slime Lynne MD, Last Rate: 100 mL/hr at 02/10/24 1534, New Bag at 02/10/24 1534    [START ON 2/11/2024] losartan tablet 50 mg, 50 mg, Oral, Daily, Slime Lynne MD    morphine injection 2 mg, 2 mg, Intravenous, Q4H PRN, Slime Lynne MD, 2 mg at 02/10/24 1610    ondansetron injection 4 mg, 4 mg, Intravenous, Q8H PRN, Slime Lynne MD    prochlorperazine injection Soln 5 mg, 5 mg, Intravenous, Q6H PRN, Slime Lynne MD    Current Outpatient Medications:     acyclovir 5% (ZOVIRAX) 5 % ointment, Apply thin layer to affected area, Disp: , Rfl:     clonazePAM (KLONOPIN) 0.5 MG tablet, Take 0.5 mg by mouth nightly as needed., Disp: , Rfl:     fluticasone propionate (FLONASE) 50 mcg/actuation nasal spray, 2 sprays (100 mcg total) by Each Nostril route daily as needed (nasal congestion). (Patient not taking: Reported on 8/9/2023), Disp: 15.8 mL, Rfl: 0    losartan (COZAAR) 50 MG tablet, Take 50 mg by mouth., Disp: , Rfl:      Review of patient's allergies indicates:  No Known Allergies    Review of Systems:  Review of Systems   Constitutional: Negative.  Negative for appetite change, chills, diaphoresis, fatigue, fever and unexpected weight change.   HENT: Negative.  Negative for sore throat and trouble swallowing.    Eyes: Negative.    Respiratory: Negative.     Cardiovascular: Negative.  Negative for chest pain.   Gastrointestinal:  Positive for abdominal distention, abdominal pain, nausea and vomiting.   Genitourinary: Negative.  Negative for difficulty  urinating, dysuria, hematuria and urgency.   Musculoskeletal: Negative.    Skin: Negative.    Neurological: Negative.    Psychiatric/Behavioral: Negative.          Current Facility-Administered Medications   Medication    acetaminophen tablet 650 mg    clonazePAM tablet 0.5 mg    dextrose 10% bolus 125 mL 125 mL    dextrose 10% bolus 250 mL 250 mL    enoxaparin injection 40 mg    glucagon (human recombinant) injection 1 mg    glucose chewable tablet 16 g    glucose chewable tablet 24 g    hydrALAZINE injection 10 mg    ketorolac injection 15 mg    lactated ringers infusion    [START ON 2/11/2024] losartan tablet 50 mg    morphine injection 2 mg    ondansetron injection 4 mg    prochlorperazine injection Soln 5 mg     Current Outpatient Medications   Medication Sig    acyclovir 5% (ZOVIRAX) 5 % ointment Apply thin layer to affected area    clonazePAM (KLONOPIN) 0.5 MG tablet Take 0.5 mg by mouth nightly as needed.    fluticasone propionate (FLONASE) 50 mcg/actuation nasal spray 2 sprays (100 mcg total) by Each Nostril route daily as needed (nasal congestion). (Patient not taking: Reported on 8/9/2023)    losartan (COZAAR) 50 MG tablet Take 50 mg by mouth.       OBJECTIVE:     Physical Exam:  Physical Exam  Constitutional:       Appearance: She is well-developed.   HENT:      Head: Normocephalic and atraumatic.      Right Ear: External ear normal.      Left Ear: External ear normal.   Eyes:      Pupils: Pupils are equal, round, and reactive to light.   Cardiovascular:      Rate and Rhythm: Normal rate and regular rhythm.      Heart sounds: Normal heart sounds.   Pulmonary:      Breath sounds: Normal breath sounds.   Abdominal:      General: Bowel sounds are normal. There is no distension.      Palpations: Abdomen is soft.      Tenderness: There is no abdominal tenderness. There is no guarding or rebound.      Hernia: No hernia is present.      Comments: Lower midline incision, healed   Musculoskeletal:          "General: Normal range of motion.      Cervical back: Neck supple.   Skin:     General: Skin is warm.   Neurological:      Mental Status: She is alert and oriented to person, place, and time.            Laboratory:  Recent Labs   Lab 02/10/24  1030   WBC 4.05   RBC 5.14   HGB 15.6   HCT 46.1      MCV 90   MCH 30.4   MCHC 33.8     BMP:   Recent Labs   Lab 02/10/24  1157   GLU 95      K 3.7      CO2 26   BUN 9   CREATININE 0.7   CALCIUM 8.3*     Lab Results   Component Value Date    CALCIUM 8.3 (L) 02/10/2024     BNP  No results for input(s): "BNP", "BNPTRIAGEBLO" in the last 168 hours.No results found for: "URICACID"No results found for: "IRON", "TIBC", "FERRITIN", "SATURATEDIRO"  Lab Results   Component Value Date    CALCIUM 8.3 (L) 02/10/2024       Diagnostic Results:  CT Abdomen Pelvis With IV Contrast NO Oral Contrast  Narrative: EXAMINATION:  CT ABDOMEN PELVIS WITH IV CONTRAST    CLINICAL HISTORY:  Nausea/vomiting;Abdominal pain, acute, nonlocalized;    TECHNIQUE:  Low dose axial images, sagittal and coronal reformations were obtained from the lung bases to the pubic symphysis following the IV administration of 75 mL of Omnipaque 350    COMPARISON:  MRI of the abdomen performed 12/23/2020.    FINDINGS:  Lower chest: Atelectasis and scar at the visualized lung bases.    Liver: Normal contour.  Subcentimeter hypodense lesions are too small to definitely characterize.    Gallbladder and bile ducts: Status post cholecystectomy.  Mildly prominent caliber bile ducts may reflect post cholecystectomy status.    Pancreas: Unremarkable.    Spleen: Unremarkable.    Adrenals: Unremarkable.    Kidneys: Unremarkable.    Lymph nodes: No abdominal or pelvic lymphadenopathy.    Bowel and mesentery: Small hiatal hernia.  Moderate colonic diverticulosis.  Moderate volume colonic stool.There is dilatation of fluid-filled small bowel loops throughout the abdomen, with a questioned transition point to decompressed " distal small bowel in the right lower quadrant (axial series 2, image 131; coronal reformat series 601, image 50).  No definite pneumatosis or pneumoperitoneum.  Relative least small volume free fluid in the abdomen pelvis, possibly reactive.    Appendix not confidently identified.  Noting this, no definite focal pericecal inflammation.  Areas of submucosal fat attenuation involving the region the cecum and proximal colon could reflect sequela of chronic nonspecific inflammation.    Abdominal aorta: Nonaneurysmal.  Mild atherosclerotic calcification.    Inferior vena cava: Infrarenal IVC filter in-situ.    Free fluid or free air: As above.    Pelvis: Unremarkable.    Body wall: Remote operative sequela suggested in the anterior abdominal wall.    Bones: No definite acute abnormality.  Relatively modest degenerative findings in the spine.  Impression: 1. Findings in keeping with small bowel obstruction, with questioned transition point in the right lower quadrant of the abdomen.  2. Additional details of chronic and incidental findings, as provided in the body of the report.  This report was flagged in Epic as abnormal.    Electronically signed by: Timur Lang  Date:    02/10/2024  Time:    13:08      IMPRESSION:  Small bowel obstruction likely due to adhesions.  No immediately compelling surgical findings  Plan:  IV fluids, NPO with ice chips sips of water, serial exam and evaluation    Thank you for the opportunity of seeing Marjorie Harkins in consultation

## 2024-02-10 NOTE — Clinical Note
Diagnosis: Small bowel obstruction [566380]   Future Attending Provider: FARIDA HENDRIX [[id number]]   Reason for IP Medical Treatment  (Clinical interventions that can only be accomplished in the IP setting? ) :: NPO and management of SBO   Estimated Length of Stay:: 2 midnights   I certify that Inpatient services for greater than or equal to 2 midnights are medically necessary:: Yes   Plans for Post-Acute care--if anticipated (pick the single best option):: A. No post acute care anticipated at this time

## 2024-02-11 LAB
ALBUMIN SERPL BCP-MCNC: 3.2 G/DL (ref 3.5–5.2)
ALP SERPL-CCNC: 215 U/L (ref 55–135)
ALT SERPL W/O P-5'-P-CCNC: 141 U/L (ref 10–44)
ANION GAP SERPL CALC-SCNC: 10 MMOL/L (ref 8–16)
AST SERPL-CCNC: 163 U/L (ref 10–40)
BASOPHILS # BLD AUTO: 0.02 K/UL (ref 0–0.2)
BASOPHILS NFR BLD: 0.6 % (ref 0–1.9)
BILIRUB SERPL-MCNC: 0.9 MG/DL (ref 0.1–1)
BUN SERPL-MCNC: 6 MG/DL (ref 8–23)
CALCIUM SERPL-MCNC: 8.2 MG/DL (ref 8.7–10.5)
CHLORIDE SERPL-SCNC: 110 MMOL/L (ref 95–110)
CO2 SERPL-SCNC: 22 MMOL/L (ref 23–29)
CREAT SERPL-MCNC: 0.6 MG/DL (ref 0.5–1.4)
DIFFERENTIAL METHOD BLD: ABNORMAL
EOSINOPHIL # BLD AUTO: 0.1 K/UL (ref 0–0.5)
EOSINOPHIL NFR BLD: 2.3 % (ref 0–8)
ERYTHROCYTE [DISTWIDTH] IN BLOOD BY AUTOMATED COUNT: 12.5 % (ref 11.5–14.5)
EST. GFR  (NO RACE VARIABLE): >60 ML/MIN/1.73 M^2
GLUCOSE SERPL-MCNC: 81 MG/DL (ref 70–110)
HCT VFR BLD AUTO: 39.8 % (ref 37–48.5)
HGB BLD-MCNC: 13.2 G/DL (ref 12–16)
IMM GRANULOCYTES # BLD AUTO: 0.01 K/UL (ref 0–0.04)
IMM GRANULOCYTES NFR BLD AUTO: 0.3 % (ref 0–0.5)
LYMPHOCYTES # BLD AUTO: 1.1 K/UL (ref 1–4.8)
LYMPHOCYTES NFR BLD: 31.1 % (ref 18–48)
MAGNESIUM SERPL-MCNC: 1.8 MG/DL (ref 1.6–2.6)
MCH RBC QN AUTO: 30 PG (ref 27–31)
MCHC RBC AUTO-ENTMCNC: 33.2 G/DL (ref 32–36)
MCV RBC AUTO: 91 FL (ref 82–98)
MONOCYTES # BLD AUTO: 0.4 K/UL (ref 0.3–1)
MONOCYTES NFR BLD: 11.2 % (ref 4–15)
NEUTROPHILS # BLD AUTO: 1.9 K/UL (ref 1.8–7.7)
NEUTROPHILS NFR BLD: 54.5 % (ref 38–73)
NRBC BLD-RTO: 0 /100 WBC
PHOSPHATE SERPL-MCNC: 3.3 MG/DL (ref 2.7–4.5)
PLATELET # BLD AUTO: 125 K/UL (ref 150–450)
PMV BLD AUTO: 10.9 FL (ref 9.2–12.9)
POTASSIUM SERPL-SCNC: 3.5 MMOL/L (ref 3.5–5.1)
PROT SERPL-MCNC: 5.3 G/DL (ref 6–8.4)
RBC # BLD AUTO: 4.4 M/UL (ref 4–5.4)
SODIUM SERPL-SCNC: 142 MMOL/L (ref 136–145)
WBC # BLD AUTO: 3.47 K/UL (ref 3.9–12.7)

## 2024-02-11 PROCEDURE — 25000003 PHARM REV CODE 250: Performed by: HOSPITALIST

## 2024-02-11 PROCEDURE — 80053 COMPREHEN METABOLIC PANEL: CPT | Performed by: HOSPITALIST

## 2024-02-11 PROCEDURE — 99231 SBSQ HOSP IP/OBS SF/LOW 25: CPT | Mod: ,,, | Performed by: SPECIALIST

## 2024-02-11 PROCEDURE — 63600175 PHARM REV CODE 636 W HCPCS: Performed by: HOSPITALIST

## 2024-02-11 PROCEDURE — 36415 COLL VENOUS BLD VENIPUNCTURE: CPT | Performed by: HOSPITALIST

## 2024-02-11 PROCEDURE — 84100 ASSAY OF PHOSPHORUS: CPT | Performed by: HOSPITALIST

## 2024-02-11 PROCEDURE — 83735 ASSAY OF MAGNESIUM: CPT | Performed by: HOSPITALIST

## 2024-02-11 PROCEDURE — 11000001 HC ACUTE MED/SURG PRIVATE ROOM

## 2024-02-11 PROCEDURE — 85025 COMPLETE CBC W/AUTO DIFF WBC: CPT | Performed by: HOSPITALIST

## 2024-02-11 RX ADMIN — KETOROLAC TROMETHAMINE 15 MG: 30 INJECTION, SOLUTION INTRAMUSCULAR at 03:02

## 2024-02-11 RX ADMIN — MORPHINE SULFATE 2 MG: 2 INJECTION, SOLUTION INTRAMUSCULAR; INTRAVENOUS at 04:02

## 2024-02-11 RX ADMIN — HYDRALAZINE HYDROCHLORIDE 10 MG: 20 INJECTION INTRAMUSCULAR; INTRAVENOUS at 03:02

## 2024-02-11 RX ADMIN — CLONAZEPAM 0.5 MG: 0.5 TABLET ORAL at 01:02

## 2024-02-11 RX ADMIN — SODIUM CHLORIDE, POTASSIUM CHLORIDE, SODIUM LACTATE AND CALCIUM CHLORIDE: 600; 310; 30; 20 INJECTION, SOLUTION INTRAVENOUS at 01:02

## 2024-02-11 RX ADMIN — LOSARTAN POTASSIUM 50 MG: 50 TABLET, FILM COATED ORAL at 08:02

## 2024-02-11 RX ADMIN — KETOROLAC TROMETHAMINE 15 MG: 30 INJECTION, SOLUTION INTRAMUSCULAR at 12:02

## 2024-02-11 RX ADMIN — ENOXAPARIN SODIUM 40 MG: 40 INJECTION SUBCUTANEOUS at 05:02

## 2024-02-11 RX ADMIN — SODIUM CHLORIDE, POTASSIUM CHLORIDE, SODIUM LACTATE AND CALCIUM CHLORIDE: 600; 310; 30; 20 INJECTION, SOLUTION INTRAVENOUS at 12:02

## 2024-02-11 RX ADMIN — SODIUM CHLORIDE, POTASSIUM CHLORIDE, SODIUM LACTATE AND CALCIUM CHLORIDE: 600; 310; 30; 20 INJECTION, SOLUTION INTRAVENOUS at 07:02

## 2024-02-11 NOTE — SUBJECTIVE & OBJECTIVE
Interval History: passing some flatus overnight, abd pain has improved, she has no nausea and no episodes of vomiting overnight. She has tolerated her antihypertensives with sips of water without issue. She was unable to sleep as she was in the ED until about 3AM and there was too much going on for her to sleep so she is very tired this morning.    Review of Systems   Constitutional:  Negative for fever.   Respiratory:  Negative for shortness of breath.    Cardiovascular:  Negative for chest pain.   Gastrointestinal:  Positive for abdominal pain. Negative for abdominal distention, diarrhea, nausea and vomiting.   Skin:  Negative for rash.   Psychiatric/Behavioral:  Positive for sleep disturbance. Negative for confusion. The patient is not nervous/anxious.      Objective:     Vital Signs (Most Recent):  Temp: 98.1 °F (36.7 °C) (02/11/24 0400)  Pulse: (!) 59 (02/11/24 0600)  Resp: 15 (02/11/24 0600)  BP: (!) 146/78 (02/11/24 0600)  SpO2: 97 % (02/11/24 0600) Vital Signs (24h Range):  Temp:  [97.9 °F (36.6 °C)-98.2 °F (36.8 °C)] 98.1 °F (36.7 °C)  Pulse:  [59-81] 59  Resp:  [15-81] 15  SpO2:  [95 %-100 %] 97 %  BP: (136-189)/(68-94) 146/78     Weight: 73.6 kg (162 lb 4.1 oz)  Body mass index is 26.19 kg/m².    Intake/Output Summary (Last 24 hours) at 2/11/2024 0753  Last data filed at 2/11/2024 0600  Gross per 24 hour   Intake 3470.67 ml   Output --   Net 3470.67 ml         Physical Exam  Constitutional:       General: She is not in acute distress.     Appearance: She is not ill-appearing.   HENT:      Mouth/Throat:      Mouth: Mucous membranes are moist.      Pharynx: Oropharynx is clear.   Cardiovascular:      Rate and Rhythm: Normal rate and regular rhythm.      Heart sounds: No murmur heard.  Pulmonary:      Effort: Pulmonary effort is normal.      Breath sounds: No wheezing or rales.   Abdominal:      General: Abdomen is flat. Bowel sounds are normal. There is no distension.      Palpations: Abdomen is soft.       Tenderness: There is abdominal tenderness (diffuse but concentrated periumbilical). There is no guarding or rebound.   Skin:     General: Skin is warm and dry.      Capillary Refill: Capillary refill takes less than 2 seconds.   Neurological:      General: No focal deficit present.      Mental Status: She is alert and oriented to person, place, and time.             Significant Labs: All pertinent labs within the past 24 hours have been reviewed.    Significant Imaging: I have reviewed all pertinent imaging results/findings within the past 24 hours.

## 2024-02-11 NOTE — PROGRESS NOTES
[facility] Intensive [facility] Medicine  Progress Note    Patient Name: Marjorie Harkins  MRN: [id number]  Patient Class: IP- Inpatient   Admission Date: 2/10/2024  Length of Stay: 1 days  Attending Physician: Yony Lino MD  Primary Care Provider: JAQUI Chatman Jr. (Inactive)        Subjective:     Principal Problem:SBO (small bowel obstruction)        HPI:  72 y/o female with HTN, Factor V deficiency, and h/o DVT s/p IVC filter and SBO x 2 who presented with abdominal pain with N/V x 5 days.  Patient reported symptoms started proximally 1 month ago when she was having constipation only relieved with straining and Dulcolax suppository which is not her normal.  Then this past Tuesday, she had projectile vomiting for approximately 4 hours of undigested food and bilious material, with subsequent abdominal pain and cramping that has been intermittent and ongoing since Tuesday.  She had diarrhea stools yesterday, and her last food intake was some bread and with butter around 9:00 p.m.  Abdominal pain described as constant with intermittent worsening, diffuse, but mainly located in the lower quadrants; she could not identify any aggravating or relieving factors, but stated it felt like the pain she had with her previous 2 bouts with bowel obstruction.  Denies hematemesis or bloody stools.  Denies fevers or chills.  Multiple abdominal surgeries including cholecystectomy, appendectomy, and hysterectomy; and was told that she would likely have problems with obstruction again.  Her 1st bowel obstruction was in 1995, and subsequent episode was approximately 10 years afterwards.  She also reports she had a recent colonoscopy, approximately 2 months ago, which was reportedly unremarkable.  In the ER, WBC count was 4.05 with normal H&H and platelets.  CMP remarkable for albumin 3.4, calcium 8.3 (corrected is 8.78); UA with 1+ ketones, positive nitrites, trace leukocyte esterase, 7 WBC and 1 RBC.  Imaging with CT  "scan of the abdomen pelvis "dilatation of fluid-filled small bowel loops throughout the abdomen, with a questioned transition point to decompressed distal small bowel in the right lower quadrant."    Overview/Hospital Course:  No notes on file    Interval History: passing some flatus overnight, abd pain has improved, she has no nausea and no episodes of vomiting overnight. She has tolerated her antihypertensives with sips of water without issue. She was unable to sleep as she was in the ED until about 3AM and there was too much going on for her to sleep so she is very tired this morning.    Review of Systems   Constitutional:  Negative for fever.   Respiratory:  Negative for shortness of breath.    Cardiovascular:  Negative for chest pain.   Gastrointestinal:  Positive for abdominal pain. Negative for abdominal distention, diarrhea, nausea and vomiting.   Skin:  Negative for rash.   Psychiatric/Behavioral:  Positive for sleep disturbance. Negative for confusion. The patient is not nervous/anxious.      Objective:     Vital Signs (Most Recent):  Temp: 98.1 °F (36.7 °C) (02/11/24 0400)  Pulse: (!) 59 (02/11/24 0600)  Resp: 15 (02/11/24 0600)  BP: (!) 146/78 (02/11/24 0600)  SpO2: 97 % (02/11/24 0600) Vital Signs (24h Range):  Temp:  [97.9 °F (36.6 °C)-98.2 °F (36.8 °C)] 98.1 °F (36.7 °C)  Pulse:  [59-81] 59  Resp:  [15-81] 15  SpO2:  [95 %-100 %] 97 %  BP: (136-189)/(68-94) 146/78     Weight: 73.6 kg (162 lb 4.1 oz)  Body mass index is 26.19 kg/m².    Intake/Output Summary (Last 24 hours) at 2/11/2024 0753  Last data filed at 2/11/2024 0600  Gross per 24 hour   Intake 3470.67 ml   Output --   Net 3470.67 ml         Physical Exam  Constitutional:       General: She is not in acute distress.     Appearance: She is not ill-appearing.   HENT:      Mouth/Throat:      Mouth: Mucous membranes are moist.      Pharynx: Oropharynx is clear.   Cardiovascular:      Rate and Rhythm: Normal rate and regular rhythm.      Heart " "sounds: No murmur heard.  Pulmonary:      Effort: Pulmonary effort is normal.      Breath sounds: No wheezing or rales.   Abdominal:      General: Abdomen is flat. Bowel sounds are normal. There is no distension.      Palpations: Abdomen is soft.      Tenderness: There is abdominal tenderness (diffuse but concentrated periumbilical). There is no guarding or rebound.   Skin:     General: Skin is warm and dry.      Capillary Refill: Capillary refill takes less than 2 seconds.   Neurological:      General: No focal deficit present.      Mental Status: She is alert and oriented to person, place, and time.             Significant Labs: All pertinent labs within the past 24 hours have been reviewed.    Significant Imaging: I have reviewed all pertinent imaging results/findings within the past 24 hours.    Assessment/Plan:      * SBO (small bowel obstruction)  H/o small bowel obstruction  - Patient with abdominal pain with previous h/o SBO x 2 in the past and CT with noted "dilatation of fluid-filled small bowel loops throughout the abdomen, with a questioned transition point to decompressed distal small bowel in the right lower quadrant." Symptoms ongoing for 1 month but acutely worse the past 5 days with N/V and recent negative colonoscopy 2 months ago but upon review of Care Everywhere, the procedure was on 10/10/23 and with removal of 1 hyperplastic polyp.  - Conservative treatment with bowel rest, pain control and supportive care, continue IV pain control and IV fluids  -mild symptoms improving, now with some flatus  -elevation in transaminases, bili normal, monitor  -f/u with surgery    Essential hypertension  - Continue losartan 50 mg p.o. daily, if able to tolerate p.o. medication  - Hydralazine IV p.r.n. with parameters    Factor V Leiden mutation  Has IVC in place, not on home anticoagulation. Lovenox Ppx dose      History of DVT (deep vein thrombosis)  Factor V Leiden deficiency  S/p IVC filter  - Patient not on " anticoagulation and has been treated with IVC filter a long time ago  - No reported recent issues  - Lovenox for prophylaxis       VTE Risk Mitigation (From admission, onward)           Ordered     enoxaparin injection 40 mg  Daily         02/10/24 1452     IP VTE HIGH RISK PATIENT  Once         02/10/24 1452     Place sequential compression device  Until discontinued         02/10/24 1452                    Discharge Planning   JOHN:      Code Status: Full Code   Is the patient medically ready for discharge?:     Reason for patient still in hospital (select all that apply): Treatment                     Yony Lino MD  Department of Hospital Medicine   [facility] - Intensive Care ([place])

## 2024-02-11 NOTE — ASSESSMENT & PLAN NOTE
"H/o small bowel obstruction  - Patient with abdominal pain with previous h/o SBO x 2 in the past and CT with noted "dilatation of fluid-filled small bowel loops throughout the abdomen, with a questioned transition point to decompressed distal small bowel in the right lower quadrant." Symptoms ongoing for 1 month but acutely worse the past 5 days with N/V and recent negative colonoscopy 2 months ago but upon review of Care Everywhere, the procedure was on 10/10/23 and with removal of 1 hyperplastic polyp.  - Conservative treatment with bowel rest, pain control and supportive care, continue IV pain control and IV fluids  -mild symptoms improving, now with some flatus  -elevation in transaminases, bili normal, monitor  -f/u with surgery  "

## 2024-02-11 NOTE — PROGRESS NOTES
[facility] Intensive Care [facility]  General Surgery  Progress Note    Subjective:     Interval History:  73-year-old female admitted with abdominal pain, nausea and vomiting.  Patient with abdominal tenderness on admission.  CT scan showed small bowel obstruction.  Past surgical history significant for cholecystectomy, open appendectomy as a child, hysterectomy for endometriosis, laparotomy for small bowel obstruction.  Patient passing small amounts of flatus, no bowel.  Some cramping pain today.  Patient ambulating      Post-Op Info:  * No surgery found *          Medications:  Continuous Infusions:   lactated ringers 100 mL/hr at 02/11/24 1243     Scheduled Meds:   enoxparin  40 mg Subcutaneous Daily    losartan  50 mg Oral Daily     PRN Meds:acetaminophen, clonazePAM, dextrose 10%, dextrose 10%, glucagon (human recombinant), glucose, glucose, hydrALAZINE, ketorolac, morphine, ondansetron, prochlorperazine     Objective:     Vital Signs (Most Recent):  Temp: 98.4 °F (36.9 °C) (02/11/24 1537)  Pulse: 70 (02/11/24 1553)  Resp: 16 (02/11/24 1658)  BP: 138/69 (02/11/24 1600)  SpO2: 100 % (02/11/24 1537) Vital Signs (24h Range):  Temp:  [97.9 °F (36.6 °C)-98.4 °F (36.9 °C)] 98.4 °F (36.9 °C)  Pulse:  [59-78] 70  Resp:  [14-22] 16  SpO2:  [95 %-100 %] 100 %  BP: (136-183)/(68-85) 138/69       Intake/Output Summary (Last 24 hours) at 2/11/2024 1748  Last data filed at 2/11/2024 0900  Gross per 24 hour   Intake 1471.67 ml   Output 600 ml   Net 871.67 ml       Physical exam   WD, WN female NAD  HEENT-atraumatic, anicteric  Neck-trachea midline  Chest-clear  Heart-regular rate and rhythm   Abdomen-soft, midepigastric tenderness, no rebound, no guarding, hypoactive bowel sounds  Extremities-no tenderness    Significant Labs:  CBC:   Recent Labs   Lab 02/11/24  0408   WBC 3.47*   RBC 4.40   HGB 13.2   HCT 39.8   *   MCV 91   MCH 30.0   MCHC 33.2     CMP:   Recent Labs   Lab 02/11/24  0408   GLU 81   CALCIUM 8.2*    ALBUMIN 3.2*   PROT 5.3*      K 3.5   CO2 22*      BUN 6*   CREATININE 0.6   ALKPHOS 215*   *   *   BILITOT 0.9       Significant Diagnostics:  CT scan of the abdomen shows small bowel obstruction.    Assessment/Plan:     Small-bowel obstruction likely due to adhesions.  No immediately compelling surgical findings.  Modest clinical improvement.  Continue supportive care and serial evaluation.  IV fluids, NPO status  Active Diagnoses:    Diagnosis Date Noted POA    PRINCIPAL PROBLEM:  SBO (small bowel obstruction) [K56.609] 02/10/2024 Yes    Essential hypertension [I10] 02/10/2024 Yes    Factor V Leiden mutation [D68.51] 12/22/2020 Yes    History of DVT (deep vein thrombosis) [Z86.718] 12/22/2020 Not Applicable    H/O small bowel obstruction [Z87.19] 12/14/2015 Not Applicable      Problems Resolved During this Admission:         Garry Marquez Jr, MD  General Surgery  [facility] - Intensive Care ([place])

## 2024-02-12 PROBLEM — N30.00 ACUTE CYSTITIS WITHOUT HEMATURIA: Status: ACTIVE | Noted: 2024-02-12

## 2024-02-12 LAB
ALBUMIN SERPL BCP-MCNC: 3.2 G/DL (ref 3.5–5.2)
ALP SERPL-CCNC: 267 U/L (ref 55–135)
ALT SERPL W/O P-5'-P-CCNC: 123 U/L (ref 10–44)
ANION GAP SERPL CALC-SCNC: 13 MMOL/L (ref 8–16)
AST SERPL-CCNC: 102 U/L (ref 10–40)
BASOPHILS # BLD AUTO: 0.02 K/UL (ref 0–0.2)
BASOPHILS NFR BLD: 0.5 % (ref 0–1.9)
BILIRUB SERPL-MCNC: 0.9 MG/DL (ref 0.1–1)
BUN SERPL-MCNC: 8 MG/DL (ref 8–23)
CALCIUM SERPL-MCNC: 8.3 MG/DL (ref 8.7–10.5)
CHLORIDE SERPL-SCNC: 109 MMOL/L (ref 95–110)
CO2 SERPL-SCNC: 18 MMOL/L (ref 23–29)
CREAT SERPL-MCNC: 0.6 MG/DL (ref 0.5–1.4)
DIFFERENTIAL METHOD BLD: ABNORMAL
EOSINOPHIL # BLD AUTO: 0.1 K/UL (ref 0–0.5)
EOSINOPHIL NFR BLD: 3.5 % (ref 0–8)
ERYTHROCYTE [DISTWIDTH] IN BLOOD BY AUTOMATED COUNT: 12.2 % (ref 11.5–14.5)
EST. GFR  (NO RACE VARIABLE): >60 ML/MIN/1.73 M^2
GLUCOSE SERPL-MCNC: 61 MG/DL (ref 70–110)
HCT VFR BLD AUTO: 39.8 % (ref 37–48.5)
HGB BLD-MCNC: 13.2 G/DL (ref 12–16)
IMM GRANULOCYTES # BLD AUTO: 0.02 K/UL (ref 0–0.04)
IMM GRANULOCYTES NFR BLD AUTO: 0.5 % (ref 0–0.5)
LYMPHOCYTES # BLD AUTO: 1.4 K/UL (ref 1–4.8)
LYMPHOCYTES NFR BLD: 36.1 % (ref 18–48)
MAGNESIUM SERPL-MCNC: 1.7 MG/DL (ref 1.6–2.6)
MCH RBC QN AUTO: 30.1 PG (ref 27–31)
MCHC RBC AUTO-ENTMCNC: 33.2 G/DL (ref 32–36)
MCV RBC AUTO: 91 FL (ref 82–98)
MONOCYTES # BLD AUTO: 0.5 K/UL (ref 0.3–1)
MONOCYTES NFR BLD: 12.6 % (ref 4–15)
NEUTROPHILS # BLD AUTO: 1.9 K/UL (ref 1.8–7.7)
NEUTROPHILS NFR BLD: 46.8 % (ref 38–73)
NRBC BLD-RTO: 0 /100 WBC
PHOSPHATE SERPL-MCNC: 3.4 MG/DL (ref 2.7–4.5)
PLATELET # BLD AUTO: 127 K/UL (ref 150–450)
PMV BLD AUTO: 11 FL (ref 9.2–12.9)
POTASSIUM SERPL-SCNC: 3.2 MMOL/L (ref 3.5–5.1)
PROT SERPL-MCNC: 5.3 G/DL (ref 6–8.4)
RBC # BLD AUTO: 4.38 M/UL (ref 4–5.4)
SODIUM SERPL-SCNC: 140 MMOL/L (ref 136–145)
WBC # BLD AUTO: 3.96 K/UL (ref 3.9–12.7)

## 2024-02-12 PROCEDURE — 80053 COMPREHEN METABOLIC PANEL: CPT | Performed by: HOSPITALIST

## 2024-02-12 PROCEDURE — 36415 COLL VENOUS BLD VENIPUNCTURE: CPT | Performed by: HOSPITALIST

## 2024-02-12 PROCEDURE — 25000003 PHARM REV CODE 250: Performed by: HOSPITALIST

## 2024-02-12 PROCEDURE — 11000001 HC ACUTE MED/SURG PRIVATE ROOM

## 2024-02-12 PROCEDURE — 25000003 PHARM REV CODE 250: Performed by: INTERNAL MEDICINE

## 2024-02-12 PROCEDURE — 85025 COMPLETE CBC W/AUTO DIFF WBC: CPT | Performed by: HOSPITALIST

## 2024-02-12 PROCEDURE — 83735 ASSAY OF MAGNESIUM: CPT | Performed by: HOSPITALIST

## 2024-02-12 PROCEDURE — 84100 ASSAY OF PHOSPHORUS: CPT | Performed by: HOSPITALIST

## 2024-02-12 PROCEDURE — 63600175 PHARM REV CODE 636 W HCPCS: Performed by: INTERNAL MEDICINE

## 2024-02-12 PROCEDURE — 63600175 PHARM REV CODE 636 W HCPCS: Performed by: HOSPITALIST

## 2024-02-12 RX ADMIN — HYDRALAZINE HYDROCHLORIDE 10 MG: 20 INJECTION INTRAMUSCULAR; INTRAVENOUS at 10:02

## 2024-02-12 RX ADMIN — CEFTRIAXONE SODIUM 1 G: 1 INJECTION, POWDER, FOR SOLUTION INTRAMUSCULAR; INTRAVENOUS at 08:02

## 2024-02-12 RX ADMIN — SODIUM CHLORIDE, POTASSIUM CHLORIDE, SODIUM LACTATE AND CALCIUM CHLORIDE: 600; 310; 30; 20 INJECTION, SOLUTION INTRAVENOUS at 11:02

## 2024-02-12 RX ADMIN — ENOXAPARIN SODIUM 40 MG: 40 INJECTION SUBCUTANEOUS at 05:02

## 2024-02-12 RX ADMIN — ACETAMINOPHEN 650 MG: 325 TABLET, FILM COATED ORAL at 10:02

## 2024-02-12 RX ADMIN — LOSARTAN POTASSIUM 50 MG: 50 TABLET, FILM COATED ORAL at 08:02

## 2024-02-12 RX ADMIN — SODIUM CHLORIDE, POTASSIUM CHLORIDE, SODIUM LACTATE AND CALCIUM CHLORIDE: 600; 310; 30; 20 INJECTION, SOLUTION INTRAVENOUS at 05:02

## 2024-02-12 NOTE — ASSESSMENT & PLAN NOTE
"H/o small bowel obstruction  - Patient with abdominal pain with previous h/o SBO x 2 in the past and CT with noted "dilatation of fluid-filled small bowel loops throughout the abdomen, with a questioned transition point to decompressed distal small bowel in the right lower quadrant." Symptoms ongoing for 1 month but acutely worse the past 5 days with N/V and recent negative colonoscopy 2 months ago but upon review of Care Everywhere, the procedure was on 10/10/23 and with removal of 1 hyperplastic polyp.  - Conservative treatment with bowel rest, pain control and supportive care, continue IV pain control and IV fluids  -mild symptoms improving, now with some flatus but no BM yet.  -elevation in transaminases, bili normal, monitor  -f/u with surgery  "

## 2024-02-12 NOTE — ASSESSMENT & PLAN NOTE
Will cover with Rocephin, initially was not treated for this but further history indicates significant LUTS prior to admission requiring treatment.

## 2024-02-12 NOTE — PROGRESS NOTES
[facility] Surg [address] Medicine  Progress Note    Patient Name: Marjorie Harkins  MRN: [id number]  Patient Class: IP- Inpatient   Admission Date: 2/10/2024  Length of Stay: 2 days  Attending Physician: Yony Lino MD  Primary Care Provider: JAQUI Chatman Jr. (Inactive)        Subjective:     Principal Problem:SBO (small bowel obstruction)        HPI:  72 y/o female with HTN, Factor V deficiency, and h/o DVT s/p IVC filter and SBO x 2 who presented with abdominal pain with N/V x 5 days.  Patient reported symptoms started proximally 1 month ago when she was having constipation only relieved with straining and Dulcolax suppository which is not her normal.  Then this past Tuesday, she had projectile vomiting for approximately 4 hours of undigested food and bilious material, with subsequent abdominal pain and cramping that has been intermittent and ongoing since Tuesday.  She had diarrhea stools yesterday, and her last food intake was some bread and with butter around 9:00 p.m.  Abdominal pain described as constant with intermittent worsening, diffuse, but mainly located in the lower quadrants; she could not identify any aggravating or relieving factors, but stated it felt like the pain she had with her previous 2 bouts with bowel obstruction.  Denies hematemesis or bloody stools.  Denies fevers or chills.  Multiple abdominal surgeries including cholecystectomy, appendectomy, and hysterectomy; and was told that she would likely have problems with obstruction again.  Her 1st bowel obstruction was in 1995, and subsequent episode was approximately 10 years afterwards.  She also reports she had a recent colonoscopy, approximately 2 months ago, which was reportedly unremarkable.  In the ER, WBC count was 4.05 with normal H&H and platelets.  CMP remarkable for albumin 3.4, calcium 8.3 (corrected is 8.78); UA with 1+ ketones, positive nitrites, trace leukocyte esterase, 7 WBC and 1 RBC.  Imaging with CT  "scan of the abdomen pelvis "dilatation of fluid-filled small bowel loops throughout the abdomen, with a questioned transition point to decompressed distal small bowel in the right lower quadrant."    Overview/Hospital Course:  Admitted for small bowel obstruction with conservative management of bowel rest, pain control and antiemetics. General Surgery consulted to follow. She is being treated with Rocephin for a UTI.    Interval History: has minimal flatus, no BM. Minimal use of pain meds and pain has improved, 1 episode of nausea last 24H without vomiting. Is having burning and frequency of urination. No f/c, CP, SOB.    Review of Systems   Respiratory:  Negative for cough and shortness of breath.    Cardiovascular:  Negative for chest pain and palpitations.   Gastrointestinal:  Positive for abdominal pain, constipation and nausea. Negative for vomiting.   Skin:  Negative for rash and wound.     Objective:     Vital Signs (Most Recent):  Temp: 98.4 °F (36.9 °C) (02/12/24 0803)  Pulse: 78 (02/12/24 0803)  Resp: 18 (02/12/24 0803)  BP: (!) 166/78 (02/12/24 0803)  SpO2: 97 % (02/12/24 0803) Vital Signs (24h Range):  Temp:  [97.9 °F (36.6 °C)-98.4 °F (36.9 °C)] 98.4 °F (36.9 °C)  Pulse:  [62-91] 78  Resp:  [14-20] 18  SpO2:  [97 %-100 %] 97 %  BP: (138-183)/(69-85) 166/78     Weight: 73.6 kg (162 lb 4.1 oz)  Body mass index is 26.19 kg/m².    Intake/Output Summary (Last 24 hours) at 2/12/2024 0831  Last data filed at 2/11/2024 0900  Gross per 24 hour   Intake --   Output 600 ml   Net -600 ml         Physical Exam  Constitutional:       General: She is not in acute distress.     Appearance: She is not ill-appearing.   HENT:      Mouth/Throat:      Mouth: Mucous membranes are moist.      Pharynx: Oropharynx is clear.   Cardiovascular:      Rate and Rhythm: Normal rate and regular rhythm.      Heart sounds: No murmur heard.  Pulmonary:      Effort: Pulmonary effort is normal.      Breath sounds: No wheezing or rales. " "  Abdominal:      General: Abdomen is flat. There is no distension.      Palpations: Abdomen is soft.      Tenderness: There is abdominal tenderness (diffuse but concentrated periumbilical). There is no guarding or rebound.      Comments: No bowel sounds   Skin:     General: Skin is warm and dry.      Capillary Refill: Capillary refill takes less than 2 seconds.   Neurological:      General: No focal deficit present.      Mental Status: She is alert and oriented to person, place, and time.             Significant Labs: All pertinent labs within the past 24 hours have been reviewed.    Significant Imaging: I have reviewed all pertinent imaging results/findings within the past 24 hours.    Assessment/Plan:      * SBO (small bowel obstruction)  H/o small bowel obstruction  - Patient with abdominal pain with previous h/o SBO x 2 in the past and CT with noted "dilatation of fluid-filled small bowel loops throughout the abdomen, with a questioned transition point to decompressed distal small bowel in the right lower quadrant." Symptoms ongoing for 1 month but acutely worse the past 5 days with N/V and recent negative colonoscopy 2 months ago but upon review of Care Everywhere, the procedure was on 10/10/23 and with removal of 1 hyperplastic polyp.  - Conservative treatment with bowel rest, pain control and supportive care, continue IV pain control and IV fluids  -mild symptoms improving, now with some flatus but no BM yet.  -elevation in transaminases, bili normal, monitor  -f/u with surgery    Acute cystitis without hematuria  Will cover with Rocephin, initially was not treated for this but further history indicates significant LUTS prior to admission requiring treatment.       Essential hypertension  - Continue losartan 50 mg p.o. daily, if able to tolerate p.o. medication  - Hydralazine IV p.r.n. with parameters    Her BP has been a bit elevated but not within danger zones. If it doesn't improve can increase her " losartan dosing, probably some pain related effect as well. Monitor for now    Factor V Leiden mutation  Has IVC in place, not on home anticoagulation. Lovenox Ppx dose      History of DVT (deep vein thrombosis)  Factor V Leiden deficiency  S/p IVC filter  - Patient not on anticoagulation and has been treated with IVC filter a long time ago  - No reported recent issues  - Lovenox for prophylaxis     H/O small bowel obstruction  Prior obstructions remotely, has been many years.        VTE Risk Mitigation (From admission, onward)           Ordered     enoxaparin injection 40 mg  Daily         02/10/24 1452     IP VTE HIGH RISK PATIENT  Once         02/10/24 1452     Place sequential compression device  Until discontinued         02/10/24 1452                    Discharge Planning   JOHN: 2/14/2024     Code Status: Full Code   Is the patient medically ready for discharge?:     Reason for patient still in hospital (select all that apply): Treatment  Discharge Plan A: Home with family                  Yony Lino MD  Department of Hospital Medicine   [facility] Surg ([address])

## 2024-02-12 NOTE — PROGRESS NOTES
House day 3.  Diagnosis-small-bowel obstruction  Status post open hysterectomy, status post laparoscopic cholecystectomy, status post open appendectomy.    Subjective   Feeling better  Passing small amounts of flatus  No bowel movement  \    PE  Afebrile  Vital signs stable  WD/WN female NAD  HEENT-anicteric, atraumatic, EOM-I would  Neck-trachea midline  Chest-clear  Heart-regular rate and rhythm  Abdomen-soft, minimal mid abdominal tenderness, no distention, no guarding, no rebound, positive bowel sounds  Extremities-no tenderness    Impression/plan   Continued modest improvement in abdominal status  Await resumption of bowel activity

## 2024-02-12 NOTE — SUBJECTIVE & OBJECTIVE
Interval History: has minimal flatus, no BM. Minimal use of pain meds and pain has improved, 1 episode of nausea last 24H without vomiting. Is having burning and frequency of urination. No f/c, CP, SOB.    Review of Systems   Respiratory:  Negative for cough and shortness of breath.    Cardiovascular:  Negative for chest pain and palpitations.   Gastrointestinal:  Positive for abdominal pain, constipation and nausea. Negative for vomiting.   Skin:  Negative for rash and wound.     Objective:     Vital Signs (Most Recent):  Temp: 98.4 °F (36.9 °C) (02/12/24 0803)  Pulse: 78 (02/12/24 0803)  Resp: 18 (02/12/24 0803)  BP: (!) 166/78 (02/12/24 0803)  SpO2: 97 % (02/12/24 0803) Vital Signs (24h Range):  Temp:  [97.9 °F (36.6 °C)-98.4 °F (36.9 °C)] 98.4 °F (36.9 °C)  Pulse:  [62-91] 78  Resp:  [14-20] 18  SpO2:  [97 %-100 %] 97 %  BP: (138-183)/(69-85) 166/78     Weight: 73.6 kg (162 lb 4.1 oz)  Body mass index is 26.19 kg/m².    Intake/Output Summary (Last 24 hours) at 2/12/2024 0831  Last data filed at 2/11/2024 0900  Gross per 24 hour   Intake --   Output 600 ml   Net -600 ml         Physical Exam  Constitutional:       General: She is not in acute distress.     Appearance: She is not ill-appearing.   HENT:      Mouth/Throat:      Mouth: Mucous membranes are moist.      Pharynx: Oropharynx is clear.   Cardiovascular:      Rate and Rhythm: Normal rate and regular rhythm.      Heart sounds: No murmur heard.  Pulmonary:      Effort: Pulmonary effort is normal.      Breath sounds: No wheezing or rales.   Abdominal:      General: Abdomen is flat. There is no distension.      Palpations: Abdomen is soft.      Tenderness: There is abdominal tenderness (diffuse but concentrated periumbilical). There is no guarding or rebound.      Comments: No bowel sounds   Skin:     General: Skin is warm and dry.      Capillary Refill: Capillary refill takes less than 2 seconds.   Neurological:      General: No focal deficit present.       Mental Status: She is alert and oriented to person, place, and time.             Significant Labs: All pertinent labs within the past 24 hours have been reviewed.    Significant Imaging: I have reviewed all pertinent imaging results/findings within the past 24 hours.

## 2024-02-12 NOTE — PLAN OF CARE
Medicare Message     Important Message from Medicare regarding Discharge Appeal Rights Given to patient/caregiver; Explained to patient/caregiver; Signed/date by patient/caregiver   Date IMM was signed 2/12/2024   Time IMM was signed [id number]

## 2024-02-12 NOTE — ASSESSMENT & PLAN NOTE
- Continue losartan 50 mg p.o. daily, if able to tolerate p.o. medication  - Hydralazine IV p.r.n. with parameters    Her BP has been a bit elevated but not within danger zones. If it doesn't improve can increase her losartan dosing, probably some pain related effect as well. Monitor for now

## 2024-02-12 NOTE — HOSPITAL COURSE
Admitted for small bowel obstruction with conservative management of bowel rest, pain control and antiemetics. General Surgery consulted to follow. She is being treated with Rocephin for a UTI. Pt has still not had BM but passing gas. Reports pain has improved compared to admission. BG and bicarb low 2/13 with elevated ketones most likely starvation ketoacidosis. Will obtain VBG and add dextrose to fluids as pt still NPO.  Pt acidosis improved with dextrose and fluids. Still no BM so discussed with surgery and ordered Gastrogaffin imaging study. Imaging study showing no obstruction and patient had bowel movement and was started on diet, with no difficulties. Discussed with surgery and stable for discharge home. Needs follow up with PCP. Pt had some elevated BP's while inpatient but will monitor it and discuss with PCP should new meds need to be added.

## 2024-02-12 NOTE — PLAN OF CARE
Problem: Fluid Deficit (Intestinal Obstruction)  Goal: Fluid Balance  Outcome: Ongoing, Progressing     Problem: Infection (Intestinal Obstruction)  Goal: Absence of Infection Signs and Symptoms  Outcome: Ongoing, Progressing     Problem: Pain (Intestinal Obstruction)  Goal: Acceptable Pain Control  Outcome: Ongoing, Progressing

## 2024-02-12 NOTE — PLAN OF CARE
SW met with patient to complete initial assessment. Patient is independent in ADLs, lives with [employer] sisters, denies HH or DME. [facility] [address]. Patient's sister will transport patient home at discharge. CM team to continue to follow.    02/12/24 1009   Discharge Assessment   Assessment Type Discharge Planning Assessment   Confirmed/corrected address, phone number and insurance Yes   Confirmed Demographics Correct on Facesheet   Source of Information patient   Communicated JOHN with patient/caregiver Date not available/Unable to determine   People in Home friend(s)   Do you expect to return to your current living situation? Yes   Prior to hospitilization cognitive status: Alert/Oriented   Current cognitive status: Alert/Oriented   Equipment Currently Used at Home none   Readmission within 30 days? No   Patient currently being followed by outpatient case management? No   Do you currently have service(s) that help you manage your care at home? No   Do you take prescription medications? Yes   Do you have any problems affording any of your prescribed medications? No   Is the patient taking medications as prescribed? yes   How do you get to doctors appointments? family or friend will provide   Are you on dialysis? No   Do you take coumadin? No   Discharge Plan A Home with family   DME Needed Upon Discharge  none   Discharge Plan discussed with: Patient   Transition of Care Barriers None   Physical Activity   On average, how many days per week do you engage in moderate to strenuous exercise (like a brisk walk)? 0 days   On average, how many minutes do you engage in exercise at this level? 0 min   Financial Resource Strain   How hard is it for you to pay for the very basics like food, housing, medical care, and heating? Not hard   Housing Stability   In the last 12 months, was there a time when you were not able to pay the mortgage or rent on time? N   In the last 12 months, how many places  have you lived? 1   In the last 12 months, was there a time when you did not have a steady place to sleep or slept in a shelter (including now)? N   Transportation Needs   In the past 12 months, has lack of transportation kept you from medical appointments or from getting medications? no   In the past 12 months, has lack of transportation kept you from meetings, work, or from getting things needed for daily living? No   Food Insecurity   Within the past 12 months, you worried that your food would run out before you got the money to buy more. Never true   Within the past 12 months, the food you bought just didn't last and you didn't have money to get more. Never true   Stress   Do you feel stress - tense, restless, nervous, or anxious, or unable to sleep at night because your mind is troubled all the time - these days? To some exte   Social Connections   In a typical week, how many times do you talk on the phone with family, friends, or neighbors? Three   How often do you get together with friends or relatives? Three times   How often do you attend Congregational or Holiness services? More than 4   Do you belong to any clubs or organizations such as Congregational groups, unions, fraternal or athletic groups, or school groups? Yes   How often do you attend meetings of the clubs or organizations you belong to? More than 4   Are you , , , , never , or living with a partner? Never marrie   Alcohol Use   Q1: How often do you have a drink containing alcohol? 4 or more ti   Q2: How many drinks containing alcohol do you have on a typical day when you are drinking? 1 or 2   Q3: How often do you have six or more drinks on one occasion? Never   OTHER   Name(s) of People in Home Ambika (Sister)     [facility] ([address])  Initial Discharge Assessment       Primary Care Provider: JAQUI Chatman Jr. (Inactive)    Admission Diagnosis: Small bowel obstruction [K56.609]  Chest pain  "[R07.9]    Admission Date: 2/10/2024  Expected Discharge Date:     Transition of Care Barriers: (P) None    Payor: [facility] MCARE / Plan: [employer] MEDICARE ADVANTAGE / Product Type: Medicare Advantage /     Extended Emergency Contact Information  Primary Emergency Contact: Ambika Tapia  Address: [address] [place] [address] [facility]  Home Phone: [phone]  Mobile Phone: [phone]  Relation: Sister  Secondary Emergency Contact: Jose Carlos Harkins "Carlos"   [place]  Mobile Phone: [phone]  Relation: Brother    Discharge Plan A: (P) Home with family         [id number] [place] [place] [facility] [address] [facility]  [address] [facility] [address]  Phone: [phone] Fax: [phone]      Initial Assessment (most recent)       Adult Discharge Assessment - 02/12/24 1009          Discharge Assessment    Assessment Type Discharge Planning Assessment     Confirmed/corrected address, phone number and insurance Yes     Confirmed Demographics Correct on Facesheet     Source of Information patient     Communicated JOHN with patient/caregiver Date not available/Unable to determine     People in Home friend(s)     Do you expect to return to your current living situation? Yes     Prior to hospitilization cognitive status: Alert/Oriented     Current cognitive status: Alert/Oriented     Equipment Currently Used at Home none     Readmission within 30 days? No     Patient currently being followed by outpatient case management? No (P)      Do you currently have service(s) that help you manage your care at home? No (P)      Do you take prescription medications? Yes (P)      Do you have any problems affording any of your prescribed medications? No (P)      Is the patient taking medications as prescribed? yes (P)      How do you get to doctors appointments? family or friend will provide (P)      Are you on dialysis? No (P)      Do you take " coumadin? No (P)      Discharge Plan A Home with family (P)      DME Needed Upon Discharge  none (P)      Discharge Plan discussed with: Patient (P)      Transition of Care Barriers None (P)         Physical Activity    On average, how many days per week do you engage in moderate to strenuous exercise (like a brisk walk)? 0 days (P)      On average, how many minutes do you engage in exercise at this level? 0 min (P)         Financial Resource Strain    How hard is it for you to pay for the very basics like food, housing, medical care, and heating? Not hard at all (P)         Housing Stability    In the last 12 months, was there a time when you were not able to pay the mortgage or rent on time? No (P)      In the last 12 months, how many places have you lived? 1 (P)      In the last 12 months, was there a time when you did not have a steady place to sleep or slept in a shelter (including now)? No (P)         Transportation Needs    In the past 12 months, has lack of transportation kept you from medical appointments or from getting medications? No (P)      In the past 12 months, has lack of transportation kept you from meetings, work, or from getting things needed for daily living? No (P)         Food Insecurity    Within the past 12 months, you worried that your food would run out before you got the money to buy more. Never true (P)      Within the past 12 months, the food you bought just didn't last and you didn't have money to get more. Never true (P)         Stress    Do you feel stress - tense, restless, nervous, or anxious, or unable to sleep at night because your mind is troubled all the time - these days? To some extent (P)         Social Connections    In a typical week, how many times do you talk on the phone with family, friends, or neighbors? Three times a week (P)      How often do you get together with friends or relatives? Three times a week (P)      How often do you attend Yazidism or Anabaptism services?  More than 4 times per year (P)      Do you belong to any clubs or organizations such as Yazidism groups, unions, fraternal or athletic groups, or school groups? Yes (P)      How often do you attend meetings of the clubs or organizations you belong to? More than 4 times per year (P)      Are you , , , , never , or living with a partner? Never  (P)         Alcohol Use    Q1: How often do you have a drink containing alcohol? 4 or more times a week (P)      Q2: How many drinks containing alcohol do you have on a typical day when you are drinking? 1 or 2 (P)      Q3: How often do you have six or more drinks on one occasion? Never (P)         OTHER    Name(s) of People in Home Ambika (Sister) (P)

## 2024-02-12 NOTE — PLAN OF CARE
Problem: Adult Inpatient Plan of Care  Goal: Plan of Care Review  Outcome: Ongoing, Progressing  Goal: Patient-Specific Goal (Individualized)  Outcome: Ongoing, Progressing  Goal: Absence of Hospital-Acquired Illness or Injury  Outcome: Ongoing, Progressing  Goal: Optimal Comfort and Wellbeing  Outcome: Ongoing, Progressing  Goal: Readiness for Transition of Care  Outcome: Ongoing, Progressing     Problem: Fluid Deficit (Intestinal Obstruction)  Goal: Fluid Balance  Outcome: Ongoing, Progressing     Problem: Pain (Intestinal Obstruction)  Goal: Acceptable Pain Control  Outcome: Ongoing, Progressing     Problem: Nausea and Vomiting (Intestinal Obstruction)  Goal: Nausea and Vomiting Relief  Outcome: Ongoing, Progressing     Problem: Infection (Intestinal Obstruction)  Goal: Absence of Infection Signs and Symptoms  Outcome: Ongoing, Progressing

## 2024-02-13 PROBLEM — E87.29 STARVATION KETOACIDOSIS: Status: ACTIVE | Noted: 2024-02-13

## 2024-02-13 PROBLEM — T73.0XXA STARVATION KETOACIDOSIS: Status: ACTIVE | Noted: 2024-02-13

## 2024-02-13 PROBLEM — D68.51 FACTOR V LEIDEN MUTATION: Status: RESOLVED | Noted: 2020-12-22 | Resolved: 2024-02-13

## 2024-02-13 LAB
ALBUMIN SERPL BCP-MCNC: 3.4 G/DL (ref 3.5–5.2)
ALP SERPL-CCNC: 228 U/L (ref 55–135)
ALT SERPL W/O P-5'-P-CCNC: 81 U/L (ref 10–44)
ANION GAP SERPL CALC-SCNC: 14 MMOL/L (ref 8–16)
ANION GAP SERPL CALC-SCNC: 9 MMOL/L (ref 8–16)
AST SERPL-CCNC: 35 U/L (ref 10–40)
B-OH-BUTYR BLD STRIP-SCNC: 5.1 MMOL/L (ref 0–0.5)
BASOPHILS # BLD AUTO: 0.02 K/UL (ref 0–0.2)
BASOPHILS NFR BLD: 0.4 % (ref 0–1.9)
BILIRUB SERPL-MCNC: 0.8 MG/DL (ref 0.1–1)
BUN SERPL-MCNC: 5 MG/DL (ref 8–23)
BUN SERPL-MCNC: 7 MG/DL (ref 8–23)
CALCIUM SERPL-MCNC: 8.5 MG/DL (ref 8.7–10.5)
CALCIUM SERPL-MCNC: 8.7 MG/DL (ref 8.7–10.5)
CHLORIDE SERPL-SCNC: 110 MMOL/L (ref 95–110)
CHLORIDE SERPL-SCNC: 111 MMOL/L (ref 95–110)
CO2 SERPL-SCNC: 15 MMOL/L (ref 23–29)
CO2 SERPL-SCNC: 20 MMOL/L (ref 23–29)
CREAT SERPL-MCNC: 0.7 MG/DL (ref 0.5–1.4)
CREAT SERPL-MCNC: 0.7 MG/DL (ref 0.5–1.4)
DIFFERENTIAL METHOD BLD: ABNORMAL
EOSINOPHIL # BLD AUTO: 0.2 K/UL (ref 0–0.5)
EOSINOPHIL NFR BLD: 3.4 % (ref 0–8)
ERYTHROCYTE [DISTWIDTH] IN BLOOD BY AUTOMATED COUNT: 12.4 % (ref 11.5–14.5)
EST. GFR  (NO RACE VARIABLE): >60 ML/MIN/1.73 M^2
EST. GFR  (NO RACE VARIABLE): >60 ML/MIN/1.73 M^2
GLUCOSE SERPL-MCNC: 115 MG/DL (ref 70–110)
GLUCOSE SERPL-MCNC: 63 MG/DL (ref 70–110)
HCT VFR BLD AUTO: 41.1 % (ref 37–48.5)
HGB BLD-MCNC: 13.5 G/DL (ref 12–16)
IMM GRANULOCYTES # BLD AUTO: 0.02 K/UL (ref 0–0.04)
IMM GRANULOCYTES NFR BLD AUTO: 0.4 % (ref 0–0.5)
LACTATE SERPL-SCNC: 0.6 MMOL/L (ref 0.5–2.2)
LYMPHOCYTES # BLD AUTO: 1.1 K/UL (ref 1–4.8)
LYMPHOCYTES NFR BLD: 22.8 % (ref 18–48)
MAGNESIUM SERPL-MCNC: 1.7 MG/DL (ref 1.6–2.6)
MCH RBC QN AUTO: 29.6 PG (ref 27–31)
MCHC RBC AUTO-ENTMCNC: 32.8 G/DL (ref 32–36)
MCV RBC AUTO: 90 FL (ref 82–98)
MONOCYTES # BLD AUTO: 0.5 K/UL (ref 0.3–1)
MONOCYTES NFR BLD: 10.7 % (ref 4–15)
NEUTROPHILS # BLD AUTO: 3.1 K/UL (ref 1.8–7.7)
NEUTROPHILS NFR BLD: 62.3 % (ref 38–73)
NRBC BLD-RTO: 0 /100 WBC
PHOSPHATE SERPL-MCNC: 3.2 MG/DL (ref 2.7–4.5)
PLATELET # BLD AUTO: 136 K/UL (ref 150–450)
PMV BLD AUTO: 10.7 FL (ref 9.2–12.9)
POCT GLUCOSE: 132 MG/DL (ref 70–110)
POCT GLUCOSE: 54 MG/DL (ref 70–110)
POTASSIUM SERPL-SCNC: 3.4 MMOL/L (ref 3.5–5.1)
POTASSIUM SERPL-SCNC: 3.8 MMOL/L (ref 3.5–5.1)
PROT SERPL-MCNC: 5.8 G/DL (ref 6–8.4)
RBC # BLD AUTO: 4.56 M/UL (ref 4–5.4)
SODIUM SERPL-SCNC: 139 MMOL/L (ref 136–145)
SODIUM SERPL-SCNC: 140 MMOL/L (ref 136–145)
WBC # BLD AUTO: 4.96 K/UL (ref 3.9–12.7)

## 2024-02-13 PROCEDURE — 63600175 PHARM REV CODE 636 W HCPCS: Performed by: HOSPITALIST

## 2024-02-13 PROCEDURE — 25000003 PHARM REV CODE 250: Performed by: INTERNAL MEDICINE

## 2024-02-13 PROCEDURE — 25000003 PHARM REV CODE 250: Performed by: STUDENT IN AN ORGANIZED HEALTH CARE EDUCATION/TRAINING PROGRAM

## 2024-02-13 PROCEDURE — 11000001 HC ACUTE MED/SURG PRIVATE ROOM

## 2024-02-13 PROCEDURE — 25000003 PHARM REV CODE 250: Performed by: HOSPITALIST

## 2024-02-13 PROCEDURE — 36415 COLL VENOUS BLD VENIPUNCTURE: CPT | Mod: XB | Performed by: STUDENT IN AN ORGANIZED HEALTH CARE EDUCATION/TRAINING PROGRAM

## 2024-02-13 PROCEDURE — 85025 COMPLETE CBC W/AUTO DIFF WBC: CPT | Performed by: HOSPITALIST

## 2024-02-13 PROCEDURE — 63600175 PHARM REV CODE 636 W HCPCS: Performed by: STUDENT IN AN ORGANIZED HEALTH CARE EDUCATION/TRAINING PROGRAM

## 2024-02-13 PROCEDURE — 83605 ASSAY OF LACTIC ACID: CPT | Performed by: STUDENT IN AN ORGANIZED HEALTH CARE EDUCATION/TRAINING PROGRAM

## 2024-02-13 PROCEDURE — 82010 KETONE BODYS QUAN: CPT | Performed by: STUDENT IN AN ORGANIZED HEALTH CARE EDUCATION/TRAINING PROGRAM

## 2024-02-13 PROCEDURE — 63600175 PHARM REV CODE 636 W HCPCS: Performed by: INTERNAL MEDICINE

## 2024-02-13 PROCEDURE — 83735 ASSAY OF MAGNESIUM: CPT | Performed by: HOSPITALIST

## 2024-02-13 PROCEDURE — 84100 ASSAY OF PHOSPHORUS: CPT | Performed by: HOSPITALIST

## 2024-02-13 PROCEDURE — 82803 BLOOD GASES ANY COMBINATION: CPT

## 2024-02-13 PROCEDURE — 80053 COMPREHEN METABOLIC PANEL: CPT | Performed by: HOSPITALIST

## 2024-02-13 PROCEDURE — 36415 COLL VENOUS BLD VENIPUNCTURE: CPT | Performed by: HOSPITALIST

## 2024-02-13 PROCEDURE — 80048 BASIC METABOLIC PNL TOTAL CA: CPT | Mod: XB | Performed by: STUDENT IN AN ORGANIZED HEALTH CARE EDUCATION/TRAINING PROGRAM

## 2024-02-13 PROCEDURE — 99900035 HC TECH TIME PER 15 MIN (STAT)

## 2024-02-13 RX ORDER — LOSARTAN POTASSIUM 50 MG/1
100 TABLET ORAL DAILY
Status: DISCONTINUED | OUTPATIENT
Start: 2024-02-14 | End: 2024-02-15 | Stop reason: HOSPADM

## 2024-02-13 RX ORDER — POTASSIUM CHLORIDE 7.45 MG/ML
10 INJECTION INTRAVENOUS
Status: COMPLETED | OUTPATIENT
Start: 2024-02-13 | End: 2024-02-13

## 2024-02-13 RX ORDER — LOSARTAN POTASSIUM 50 MG/1
50 TABLET ORAL ONCE
Status: COMPLETED | OUTPATIENT
Start: 2024-02-13 | End: 2024-02-13

## 2024-02-13 RX ORDER — IBUPROFEN 400 MG/1
400 TABLET ORAL ONCE
Status: COMPLETED | OUTPATIENT
Start: 2024-02-13 | End: 2024-02-13

## 2024-02-13 RX ORDER — DEXTROSE, SODIUM CHLORIDE, SODIUM LACTATE, POTASSIUM CHLORIDE, AND CALCIUM CHLORIDE 5; .6; .31; .03; .02 G/100ML; G/100ML; G/100ML; G/100ML; G/100ML
INJECTION, SOLUTION INTRAVENOUS CONTINUOUS
Status: DISCONTINUED | OUTPATIENT
Start: 2024-02-13 | End: 2024-02-15

## 2024-02-13 RX ADMIN — HYDRALAZINE HYDROCHLORIDE 10 MG: 20 INJECTION INTRAMUSCULAR; INTRAVENOUS at 12:02

## 2024-02-13 RX ADMIN — SODIUM CHLORIDE, SODIUM LACTATE, POTASSIUM CHLORIDE, CALCIUM CHLORIDE AND DEXTROSE MONOHYDRATE: 5; 600; 310; 30; 20 INJECTION, SOLUTION INTRAVENOUS at 07:02

## 2024-02-13 RX ADMIN — POTASSIUM CHLORIDE 10 MEQ: 7.46 INJECTION, SOLUTION INTRAVENOUS at 09:02

## 2024-02-13 RX ADMIN — ENOXAPARIN SODIUM 40 MG: 40 INJECTION SUBCUTANEOUS at 05:02

## 2024-02-13 RX ADMIN — IBUPROFEN 400 MG: 400 TABLET, FILM COATED ORAL at 09:02

## 2024-02-13 RX ADMIN — CLONAZEPAM 0.5 MG: 0.5 TABLET ORAL at 01:02

## 2024-02-13 RX ADMIN — SODIUM CHLORIDE, SODIUM LACTATE, POTASSIUM CHLORIDE, CALCIUM CHLORIDE AND DEXTROSE MONOHYDRATE: 5; 600; 310; 30; 20 INJECTION, SOLUTION INTRAVENOUS at 08:02

## 2024-02-13 RX ADMIN — LOSARTAN POTASSIUM 50 MG: 50 TABLET, FILM COATED ORAL at 09:02

## 2024-02-13 RX ADMIN — CEFTRIAXONE SODIUM 1 G: 1 INJECTION, POWDER, FOR SOLUTION INTRAMUSCULAR; INTRAVENOUS at 09:02

## 2024-02-13 RX ADMIN — POTASSIUM CHLORIDE 10 MEQ: 7.46 INJECTION, SOLUTION INTRAVENOUS at 10:02

## 2024-02-13 RX ADMIN — ACETAMINOPHEN 650 MG: 325 TABLET, FILM COATED ORAL at 05:02

## 2024-02-13 RX ADMIN — LOSARTAN POTASSIUM 50 MG: 50 TABLET, FILM COATED ORAL at 05:02

## 2024-02-13 RX ADMIN — DEXTROSE MONOHYDRATE 125 ML: 100 INJECTION, SOLUTION INTRAVENOUS at 06:02

## 2024-02-13 NOTE — ASSESSMENT & PLAN NOTE
Plan -   - Continue losartan 50 mg p.o. daily, if able to tolerate p.o. medication  - Hydralazine IV p.r.n. with parameters    Her BP has been a bit elevated but not within danger zones. If it doesn't improve can increase her losartan dosing, probably some pain related effect as well. Monitor for now

## 2024-02-13 NOTE — NURSING
Blood pressure 195/91.  Repeat reading was 181/95.  IV hydralazine given as order.  Approximately 30 minutes later pt reported feeling heart palpitation.  Pulse was 125, bp 170/85.  Pt was anxious and stated she felt like her hands were swelling.  Upon rechecking bp, pt complained that the bp cuff was too tight. Got larger bp cuff, and got a reading of 140/80, using the dynamap.  Rechecked  bp again using the bp cuff that was used previously and got a reading of 140/66, pulse 93. Rechecked bp manually and got 150/78, pulse 82. Pt stated she was feeling better, and shirley hands no longer felt like they were swelling, and she was no longer anxious.Will notify on call hospitalist and continue to monitor.   Alar Island Pedicle Flap Text: The defect edges were debeveled with a #15 scalpel blade.  Given the location of the defect, shape of the defect and the proximity to the alar rim an island pedicle advancement flap was deemed most appropriate.  Using a sterile surgical marker, an appropriate advancement flap was drawn incorporating the defect, outlining the appropriate donor tissue and placing the expected incisions within the nasal ala running parallel to the alar rim. The area thus outlined was incised with a #15 scalpel blade.  The skin margins were undermined minimally to an appropriate distance in all directions around the primary defect and laterally outward around the island pedicle utilizing iris scissors.  There was minimal undermining beneath the pedicle flap.

## 2024-02-13 NOTE — PROGRESS NOTES
House day 4.  Diagnosis-small-bowel obstruction likely due to adhesions  Status post open appendectomy, hysterectomy for endometriosis, laparoscopic cholecystectomy, enterolysis of adhesions for bowel obstruction    Subjective   Abdominal pain improved  Passing some flatus  No bowel movement  Ambulating in halls    PE  Afebrile  Vital signs stable  General-WD/WN female NAD  HEENT-normocephalic, atraumatic, anicteric, EOM-I   Neck-trachea midline   Chest-clear  Heart-regular rate and rhythm   Abdomen-soft, nontender, positive bowel sounds, no guarding, no rebound, no masses  Extremities-no tenderness, edema    Impression/plan  No immediately compelling surgical abdomen   Continue NPO, IV fluids, serial exam

## 2024-02-13 NOTE — SUBJECTIVE & OBJECTIVE
Interval History: Pt has still not had BM but passing gas. Reports pain has improved compared to admission. BG and bicarb low this AM with elevated ketones most likely starvation ketoacidosis. Will obtain VBG and add dextrose to fluids as pt still NPO.     Review of Systems   Respiratory:  Negative for cough and shortness of breath.    Cardiovascular:  Negative for chest pain and palpitations.   Gastrointestinal:  Positive for abdominal pain, constipation and nausea. Negative for vomiting.   Skin:  Negative for rash and wound.     Objective:     Vital Signs (Most Recent):  Temp: 97.7 °F (36.5 °C) (02/13/24 0846)  Pulse: 66 (02/13/24 0846)  Resp: 18 (02/13/24 0846)  BP: (!) 159/84 (02/13/24 0846)  SpO2: 98 % (02/13/24 0846) Vital Signs (24h Range):  Temp:  [97.7 °F (36.5 °C)-98.2 °F (36.8 °C)] 97.7 °F (36.5 °C)  Pulse:  [] 66  Resp:  [18-20] 18  SpO2:  [97 %-99 %] 98 %  BP: (115-195)/(65-95) 159/84     Weight: 73.6 kg (162 lb 4.1 oz)  Body mass index is 26.19 kg/m².    Intake/Output Summary (Last 24 hours) at 2/13/2024 0959  Last data filed at 2/13/2024 0546  Gross per 24 hour   Intake 3403.63 ml   Output --   Net 3403.63 ml         Physical Exam  Constitutional:       General: She is not in acute distress.     Appearance: She is not ill-appearing.   Cardiovascular:      Rate and Rhythm: Normal rate and regular rhythm.      Heart sounds: No murmur heard.  Pulmonary:      Effort: Pulmonary effort is normal.      Breath sounds: No wheezing or rales.   Abdominal:      General: Abdomen is flat. There is no distension.      Palpations: Abdomen is soft.      Tenderness: There is abdominal tenderness (diffuse but concentrated periumbilical. This has improved.). There is no guarding or rebound.      Comments: No bowel sounds   Musculoskeletal:      Right lower leg: No edema.      Left lower leg: No edema.   Neurological:      General: No focal deficit present.      Mental Status: She is alert and oriented to person,  place, and time.             Significant Labs: All pertinent labs within the past 24 hours have been reviewed.    Significant Imaging: I have reviewed all pertinent imaging results/findings within the past 24 hours.

## 2024-02-13 NOTE — PROGRESS NOTES
[facility] [address] Medicine  Progress Note    Patient Name: Marjorie Harkins  MRN: [id number]  Patient Class: IP- Inpatient   Admission Date: 2/10/2024  Length of Stay: 3 days  Attending Physician: Rafa Barnett MD  Primary Care Provider: JAQUI Chatman Jr. (Inactive)        Subjective:     Principal Problem:SBO (small bowel obstruction)        HPI:  74 y/o female with HTN, Factor V deficiency, and h/o DVT s/p IVC filter and SBO x 2 who presented with abdominal pain with N/V x 5 days.  Patient reported symptoms started proximally 1 month ago when she was having constipation only relieved with straining and Dulcolax suppository which is not her normal.  Then this past Tuesday, she had projectile vomiting for approximately 4 hours of undigested food and bilious material, with subsequent abdominal pain and cramping that has been intermittent and ongoing since Tuesday.  She had diarrhea stools yesterday, and her last food intake was some bread and with butter around 9:00 p.m.  Abdominal pain described as constant with intermittent worsening, diffuse, but mainly located in the lower quadrants; she could not identify any aggravating or relieving factors, but stated it felt like the pain she had with her previous 2 bouts with bowel obstruction.  Denies hematemesis or bloody stools.  Denies fevers or chills.  Multiple abdominal surgeries including cholecystectomy, appendectomy, and hysterectomy; and was told that she would likely have problems with obstruction again.  Her 1st bowel obstruction was in 1995, and subsequent episode was approximately 10 years afterwards.  She also reports she had a recent colonoscopy, approximately 2 months ago, which was reportedly unremarkable.  In the ER, WBC count was 4.05 with normal H&H and platelets.  CMP remarkable for albumin 3.4, calcium 8.3 (corrected is 8.78); UA with 1+ ketones, positive nitrites, trace leukocyte esterase, 7 WBC and 1 RBC.  Imaging with  "CT scan of the abdomen pelvis "dilatation of fluid-filled small bowel loops throughout the abdomen, with a questioned transition point to decompressed distal small bowel in the right lower quadrant."    Overview/Hospital Course:  Admitted for small bowel obstruction with conservative management of bowel rest, pain control and antiemetics. General Surgery consulted to follow. She is being treated with Rocephin for a UTI. Pt has still not had BM but passing gas. Reports pain has improved compared to admission. BG and bicarb low this AM with elevated ketones most likely starvation ketoacidosis. Will obtain VBG and add dextrose to fluids as pt still NPO.      Interval History: Pt has still not had BM but passing gas. Reports pain has improved compared to admission. BG and bicarb low this AM with elevated ketones most likely starvation ketoacidosis. Will obtain VBG and add dextrose to fluids as pt still NPO.     Review of Systems   Respiratory:  Negative for cough and shortness of breath.    Cardiovascular:  Negative for chest pain and palpitations.   Gastrointestinal:  Positive for abdominal pain, constipation and nausea. Negative for vomiting.   Skin:  Negative for rash and wound.     Objective:     Vital Signs (Most Recent):  Temp: 97.7 °F (36.5 °C) (02/13/24 0846)  Pulse: 66 (02/13/24 0846)  Resp: 18 (02/13/24 0846)  BP: (!) 159/84 (02/13/24 0846)  SpO2: 98 % (02/13/24 0846) Vital Signs (24h Range):  Temp:  [97.7 °F (36.5 °C)-98.2 °F (36.8 °C)] 97.7 °F (36.5 °C)  Pulse:  [] 66  Resp:  [18-20] 18  SpO2:  [97 %-99 %] 98 %  BP: (115-195)/(65-95) 159/84     Weight: 73.6 kg (162 lb 4.1 oz)  Body mass index is 26.19 kg/m².    Intake/Output Summary (Last 24 hours) at 2/13/2024 0927  Last data filed at 2/13/2024 0546  Gross per 24 hour   Intake 3403.63 ml   Output --   Net 3403.63 ml         Physical Exam  Constitutional:       General: She is not in acute distress.     Appearance: She is not ill-appearing. " "  Cardiovascular:      Rate and Rhythm: Normal rate and regular rhythm.      Heart sounds: No murmur heard.  Pulmonary:      Effort: Pulmonary effort is normal.      Breath sounds: No wheezing or rales.   Abdominal:      General: Abdomen is flat. There is no distension.      Palpations: Abdomen is soft.      Tenderness: There is abdominal tenderness (diffuse but concentrated periumbilical. This has improved.). There is no guarding or rebound.      Comments: No bowel sounds   Musculoskeletal:      Right lower leg: No edema.      Left lower leg: No edema.   Neurological:      General: No focal deficit present.      Mental Status: She is alert and oriented to person, place, and time.             Significant Labs: All pertinent labs within the past 24 hours have been reviewed.    Significant Imaging: I have reviewed all pertinent imaging results/findings within the past 24 hours.    Assessment/Plan:      * SBO (small bowel obstruction)  H/o small bowel obstruction    Patient with abdominal pain with previous h/o SBO x 2 in the past and CT with noted "dilatation of fluid-filled small bowel loops throughout the abdomen, with a questioned transition point to decompressed distal small bowel in the right lower quadrant." Symptoms ongoing for 1 month but acutely worse the past 5 days with N/V and recent negative colonoscopy 2 months ago but upon review of Care Everywhere, the procedure was on 10/10/23 and with removal of 1 hyperplastic polyp.    Plan -   - Conservative treatment with bowel rest, pain control and supportive care, continue IV pain control and IV fluids  -mild symptoms improving, now with some flatus but no BM yet.  -elevation in transaminases, bili normal - improving, monitor   -f/u with surgery - conservative mngx     Starvation ketoacidosis  Hypoglycemia     Bicarb, 15, ketones 5    Plan -  - Add dextrose to fluids   - VBG       Acute cystitis without hematuria  Will cover with Rocephin, initially was not " treated for this but further history indicates significant LUTS prior to admission requiring treatment.       Essential hypertension  Plan -   - Continue losartan 50 mg p.o. daily, if able to tolerate p.o. medication  - Hydralazine IV p.r.n. with parameters    Her BP has been a bit elevated but not within danger zones. If it doesn't improve can increase her losartan dosing, probably some pain related effect as well. Monitor for now    History of DVT (deep vein thrombosis)  Factor V Leiden deficiency  S/p IVC filter  - Patient not on anticoagulation and has been treated with IVC filter a long time ago  - No reported recent issues  - Lovenox for prophylaxis       VTE Risk Mitigation (From admission, onward)           Ordered     enoxaparin injection 40 mg  Daily         02/10/24 1452     IP VTE HIGH RISK PATIENT  Once         02/10/24 1452     Place sequential compression device  Until discontinued         02/10/24 1452                    Discharge Planning   JOHN: 2/14/2024     Code Status: Full Code   Is the patient medically ready for discharge?:     Reason for patient still in hospital (select all that apply): Treatment  Discharge Plan A: Home with family                  Rafa Hudson MD  Department of Hospital Medicine   [facility] ([address])

## 2024-02-13 NOTE — ASSESSMENT & PLAN NOTE
"H/o small bowel obstruction    Patient with abdominal pain with previous h/o SBO x 2 in the past and CT with noted "dilatation of fluid-filled small bowel loops throughout the abdomen, with a questioned transition point to decompressed distal small bowel in the right lower quadrant." Symptoms ongoing for 1 month but acutely worse the past 5 days with N/V and recent negative colonoscopy 2 months ago but upon review of Care Everywhere, the procedure was on 10/10/23 and with removal of 1 hyperplastic polyp.    Plan -   - Conservative treatment with bowel rest, pain control and supportive care, continue IV pain control and IV fluids  -mild symptoms improving, now with some flatus but no BM yet.  -elevation in transaminases, bili normal - improving, monitor   -f/u with surgery - conservative mngx   "

## 2024-02-14 LAB
ANION GAP SERPL CALC-SCNC: 6 MMOL/L (ref 8–16)
BUN SERPL-MCNC: 4 MG/DL (ref 8–23)
CALCIUM SERPL-MCNC: 8.7 MG/DL (ref 8.7–10.5)
CHLORIDE SERPL-SCNC: 110 MMOL/L (ref 95–110)
CO2 SERPL-SCNC: 24 MMOL/L (ref 23–29)
CREAT SERPL-MCNC: 0.6 MG/DL (ref 0.5–1.4)
ERYTHROCYTE [DISTWIDTH] IN BLOOD BY AUTOMATED COUNT: 12.2 % (ref 11.5–14.5)
EST. GFR  (NO RACE VARIABLE): >60 ML/MIN/1.73 M^2
GLUCOSE SERPL-MCNC: 133 MG/DL (ref 70–110)
HCT VFR BLD AUTO: 38.5 % (ref 37–48.5)
HGB BLD-MCNC: 13 G/DL (ref 12–16)
MCH RBC QN AUTO: 29.6 PG (ref 27–31)
MCHC RBC AUTO-ENTMCNC: 33.8 G/DL (ref 32–36)
MCV RBC AUTO: 88 FL (ref 82–98)
PLATELET # BLD AUTO: 154 K/UL (ref 150–450)
PMV BLD AUTO: 10.6 FL (ref 9.2–12.9)
POTASSIUM SERPL-SCNC: 3.2 MMOL/L (ref 3.5–5.1)
RBC # BLD AUTO: 4.39 M/UL (ref 4–5.4)
SODIUM SERPL-SCNC: 140 MMOL/L (ref 136–145)
WBC # BLD AUTO: 5 K/UL (ref 3.9–12.7)

## 2024-02-14 PROCEDURE — 36415 COLL VENOUS BLD VENIPUNCTURE: CPT | Performed by: STUDENT IN AN ORGANIZED HEALTH CARE EDUCATION/TRAINING PROGRAM

## 2024-02-14 PROCEDURE — 25000003 PHARM REV CODE 250: Performed by: HOSPITALIST

## 2024-02-14 PROCEDURE — 63600175 PHARM REV CODE 636 W HCPCS: Performed by: STUDENT IN AN ORGANIZED HEALTH CARE EDUCATION/TRAINING PROGRAM

## 2024-02-14 PROCEDURE — 25000003 PHARM REV CODE 250: Performed by: STUDENT IN AN ORGANIZED HEALTH CARE EDUCATION/TRAINING PROGRAM

## 2024-02-14 PROCEDURE — 85027 COMPLETE CBC AUTOMATED: CPT | Performed by: STUDENT IN AN ORGANIZED HEALTH CARE EDUCATION/TRAINING PROGRAM

## 2024-02-14 PROCEDURE — 11000001 HC ACUTE MED/SURG PRIVATE ROOM

## 2024-02-14 PROCEDURE — 80048 BASIC METABOLIC PNL TOTAL CA: CPT | Performed by: STUDENT IN AN ORGANIZED HEALTH CARE EDUCATION/TRAINING PROGRAM

## 2024-02-14 PROCEDURE — 25500020 PHARM REV CODE 255: Performed by: STUDENT IN AN ORGANIZED HEALTH CARE EDUCATION/TRAINING PROGRAM

## 2024-02-14 PROCEDURE — 63600175 PHARM REV CODE 636 W HCPCS: Performed by: HOSPITALIST

## 2024-02-14 RX ORDER — POTASSIUM CHLORIDE 20 MEQ/1
40 TABLET, EXTENDED RELEASE ORAL ONCE
Status: COMPLETED | OUTPATIENT
Start: 2024-02-14 | End: 2024-02-14

## 2024-02-14 RX ORDER — LABETALOL HYDROCHLORIDE 5 MG/ML
10 INJECTION, SOLUTION INTRAVENOUS EVERY 6 HOURS PRN
Status: DISCONTINUED | OUTPATIENT
Start: 2024-02-14 | End: 2024-02-15 | Stop reason: HOSPADM

## 2024-02-14 RX ADMIN — ACETAMINOPHEN 650 MG: 325 TABLET, FILM COATED ORAL at 06:02

## 2024-02-14 RX ADMIN — CEFTRIAXONE SODIUM 1 G: 1 INJECTION, POWDER, FOR SOLUTION INTRAMUSCULAR; INTRAVENOUS at 08:02

## 2024-02-14 RX ADMIN — ENOXAPARIN SODIUM 40 MG: 40 INJECTION SUBCUTANEOUS at 06:02

## 2024-02-14 RX ADMIN — LOSARTAN POTASSIUM 100 MG: 50 TABLET, FILM COATED ORAL at 07:02

## 2024-02-14 RX ADMIN — DIATRIZOATE MEGLUMINE AND DIATRIZOATE SODIUM 240 ML: 660; 100 LIQUID ORAL; RECTAL at 03:02

## 2024-02-14 RX ADMIN — SODIUM CHLORIDE, SODIUM LACTATE, POTASSIUM CHLORIDE, CALCIUM CHLORIDE AND DEXTROSE MONOHYDRATE: 5; 600; 310; 30; 20 INJECTION, SOLUTION INTRAVENOUS at 06:02

## 2024-02-14 RX ADMIN — POTASSIUM CHLORIDE 20 MEQ: 1500 TABLET, EXTENDED RELEASE ORAL at 08:02

## 2024-02-14 NOTE — PROGRESS NOTES
[facility] Surg [address] Medicine  Progress Note    Patient Name: Marjorie Harkins  MRN: [id number]  Patient Class: IP- Inpatient   Admission Date: 2/10/2024  Length of Stay: 4 days  Attending Physician: Rafa Barnett MD  Primary Care Provider: JAQUI Chatman Jr. (Inactive)        Subjective:     Principal Problem:SBO (small bowel obstruction)        HPI:  74 y/o female with HTN, Factor V deficiency, and h/o DVT s/p IVC filter and SBO x 2 who presented with abdominal pain with N/V x 5 days.  Patient reported symptoms started proximally 1 month ago when she was having constipation only relieved with straining and Dulcolax suppository which is not her normal.  Then this past Tuesday, she had projectile vomiting for approximately 4 hours of undigested food and bilious material, with subsequent abdominal pain and cramping that has been intermittent and ongoing since Tuesday.  She had diarrhea stools yesterday, and her last food intake was some bread and with butter around 9:00 p.m.  Abdominal pain described as constant with intermittent worsening, diffuse, but mainly located in the lower quadrants; she could not identify any aggravating or relieving factors, but stated it felt like the pain she had with her previous 2 bouts with bowel obstruction.  Denies hematemesis or bloody stools.  Denies fevers or chills.  Multiple abdominal surgeries including cholecystectomy, appendectomy, and hysterectomy; and was told that she would likely have problems with obstruction again.  Her 1st bowel obstruction was in 1995, and subsequent episode was approximately 10 years afterwards.  She also reports she had a recent colonoscopy, approximately 2 months ago, which was reportedly unremarkable.  In the ER, WBC count was 4.05 with normal H&H and platelets.  CMP remarkable for albumin 3.4, calcium 8.3 (corrected is 8.78); UA with 1+ ketones, positive nitrites, trace leukocyte esterase, 7 WBC and 1 RBC.  Imaging with  "CT scan of the abdomen pelvis "dilatation of fluid-filled small bowel loops throughout the abdomen, with a questioned transition point to decompressed distal small bowel in the right lower quadrant."    Overview/Hospital Course:  Admitted for small bowel obstruction with conservative management of bowel rest, pain control and antiemetics. General Surgery consulted to follow. She is being treated with Rocephin for a UTI. Pt has still not had BM but passing gas. Reports pain has improved compared to admission. BG and bicarb low 2/13 with elevated ketones most likely starvation ketoacidosis. Will obtain VBG and add dextrose to fluids as pt still NPO.  Pt acidosis improved with dextrose and fluids. Still no BM so discussed with surgery and ordered Gastrogaffin imaging study.     Interval History: Pt acidosis improved with dextrose and fluids. She feels better as well. Still no BM so discussed with surgery and ordered Gastrogaffin imaging study.     Review of Systems   Respiratory:  Negative for cough and shortness of breath.    Cardiovascular:  Negative for chest pain and palpitations.   Gastrointestinal:  Positive for abdominal pain, constipation and nausea. Negative for vomiting.   Skin:  Negative for rash and wound.     Objective:     Vital Signs (Most Recent):  Temp: 99.3 °F (37.4 °C) (02/14/24 0740)  Pulse: 60 (02/14/24 0740)  Resp: 16 (02/14/24 0740)  BP: (!) 147/79 (02/14/24 0759)  SpO2: 95 % (02/14/24 0740) Vital Signs (24h Range):  Temp:  [97.4 °F (36.3 °C)-99.3 °F (37.4 °C)] 99.3 °F (37.4 °C)  Pulse:  [60-87] 60  Resp:  [16-20] 16  SpO2:  [95 %-99 %] 95 %  BP: (147-186)/(72-88) 147/79     Weight: 73.6 kg (162 lb 4.1 oz)  Body mass index is 26.19 kg/m².    Intake/Output Summary (Last 24 hours) at 2/14/2024 1037  Last data filed at 2/14/2024 0639  Gross per 24 hour   Intake 2267.15 ml   Output --   Net 2267.15 ml           Physical Exam  Constitutional:       General: She is not in acute distress.     " "Appearance: She is not ill-appearing.   Cardiovascular:      Rate and Rhythm: Normal rate and regular rhythm.      Heart sounds: No murmur heard.  Pulmonary:      Effort: Pulmonary effort is normal.      Breath sounds: No wheezing or rales.   Abdominal:      General: Abdomen is flat. There is no distension.      Palpations: Abdomen is soft.      Tenderness: There is abdominal tenderness (diffuse but concentrated periumbilical. This has improved.). There is no guarding or rebound.      Comments: No bowel sounds   Musculoskeletal:      Right lower leg: No edema.      Left lower leg: No edema.   Neurological:      General: No focal deficit present.      Mental Status: She is alert and oriented to person, place, and time.             Significant Labs: All pertinent labs within the past 24 hours have been reviewed.    Significant Imaging: I have reviewed all pertinent imaging results/findings within the past 24 hours.    Assessment/Plan:      * SBO (small bowel obstruction)  H/o small bowel obstruction    Patient with abdominal pain with previous h/o SBO x 2 in the past and CT with noted "dilatation of fluid-filled small bowel loops throughout the abdomen, with a questioned transition point to decompressed distal small bowel in the right lower quadrant." Symptoms ongoing for 1 month but acutely worse the past 5 days with N/V and recent negative colonoscopy 2 months ago but upon review of Care Everywhere, the procedure was on 10/10/23 and with removal of 1 hyperplastic polyp.    Plan -   - Conservative treatment with bowel rest, pain control and supportive care, continue IV pain control and IV fluids  -mild symptoms improving, now with some flatus but no BM yet.  -elevation in transaminases, bili normal - improving, monitor   -f/u with surgery - conservative mngx, Gastrogaffin imaging study 2/14     Starvation ketoacidosis  Hypoglycemia     Resolved     Bicarb, 15, ketones 5  Dextrose fluids started  2/13  VBG taken after " dextrose fluids started and rpt BMP bicarb 20 and VBG 7.32/pco2 34.4/po2 33/be -8/hco3 17.7 (results not in epic for some reason but reported to me by RT ELLA Romo )            Acute cystitis without hematuria  Will cover with Rocephin, initially was not treated for this but further history indicates significant LUTS prior to admission requiring treatment.       Essential hypertension  Plan -   - Increased losartan to 100mg 2/13 - pt reports she was recently increased to 100mg anyway by her PCP   - Stopped PRN hydralazine as pt reports she had reaction to it - feelings of diaphoresis and palpitation etc  - Can use PRN labetolol if we need to     History of DVT (deep vein thrombosis)  Factor V Leiden deficiency  S/p IVC filter    Plan -   - Patient not on anticoagulation and has been treated with IVC filter a long time ago  - No reported recent issues  - Lovenox for prophylaxis       VTE Risk Mitigation (From admission, onward)           Ordered     enoxaparin injection 40 mg  Daily         02/10/24 1452     IP VTE HIGH RISK PATIENT  Once         02/10/24 1452     Place sequential compression device  Until discontinued         02/10/24 1452                    Discharge Planning   JOHN: 2/14/2024     Code Status: Full Code   Is the patient medically ready for discharge?:     Reason for patient still in hospital (select all that apply): Treatment  Discharge Plan A: Home with family                  Rafa Hudson MD  Department of Hospital Medicine   [facility] - [facility] Surg ([address])

## 2024-02-14 NOTE — ASSESSMENT & PLAN NOTE
Plan -   - Increased losartan to 100mg 2/13 - pt reports she was recently increased to 100mg anyway by her PCP   - Stopped PRN hydralazine as pt reports she had reaction to it - feelings of diaphoresis and palpitation etc  - Can use PRN labetolol if we need to

## 2024-02-14 NOTE — ASSESSMENT & PLAN NOTE
Hypoglycemia     Resolved     Bicarb, 15, ketones 5  Dextrose fluids started  2/13  VBG taken after dextrose fluids started and rpt BMP bicarb 20 and VBG 7.32/pco2 34.4/po2 33/be -8/hco3 17.7 (results not in epic for some reason but reported to me by RT ELLA Romo )

## 2024-02-14 NOTE — ASSESSMENT & PLAN NOTE
Factor V Leiden deficiency  S/p IVC filter    Plan -   - Patient not on anticoagulation and has been treated with IVC filter a long time ago  - No reported recent issues  - Lovenox for prophylaxis

## 2024-02-14 NOTE — SUBJECTIVE & OBJECTIVE
Interval History: Pt acidosis improved with dextrose and fluids. She feels better as well. Still no BM so discussed with surgery and ordered Gastrogaffin imaging study.     Review of Systems   Respiratory:  Negative for cough and shortness of breath.    Cardiovascular:  Negative for chest pain and palpitations.   Gastrointestinal:  Positive for abdominal pain, constipation and nausea. Negative for vomiting.   Skin:  Negative for rash and wound.     Objective:     Vital Signs (Most Recent):  Temp: 99.3 °F (37.4 °C) (02/14/24 0740)  Pulse: 60 (02/14/24 0740)  Resp: 16 (02/14/24 0740)  BP: (!) 147/79 (02/14/24 0759)  SpO2: 95 % (02/14/24 0740) Vital Signs (24h Range):  Temp:  [97.4 °F (36.3 °C)-99.3 °F (37.4 °C)] 99.3 °F (37.4 °C)  Pulse:  [60-87] 60  Resp:  [16-20] 16  SpO2:  [95 %-99 %] 95 %  BP: (147-186)/(72-88) 147/79     Weight: 73.6 kg (162 lb 4.1 oz)  Body mass index is 26.19 kg/m².    Intake/Output Summary (Last 24 hours) at 2/14/2024 1037  Last data filed at 2/14/2024 0639  Gross per 24 hour   Intake 2267.15 ml   Output --   Net 2267.15 ml           Physical Exam  Constitutional:       General: She is not in acute distress.     Appearance: She is not ill-appearing.   Cardiovascular:      Rate and Rhythm: Normal rate and regular rhythm.      Heart sounds: No murmur heard.  Pulmonary:      Effort: Pulmonary effort is normal.      Breath sounds: No wheezing or rales.   Abdominal:      General: Abdomen is flat. There is no distension.      Palpations: Abdomen is soft.      Tenderness: There is abdominal tenderness (diffuse but concentrated periumbilical. This has improved.). There is no guarding or rebound.      Comments: No bowel sounds   Musculoskeletal:      Right lower leg: No edema.      Left lower leg: No edema.   Neurological:      General: No focal deficit present.      Mental Status: She is alert and oriented to person, place, and time.             Significant Labs: All pertinent labs within the past 24  hours have been reviewed.    Significant Imaging: I have reviewed all pertinent imaging results/findings within the past 24 hours.

## 2024-02-14 NOTE — ASSESSMENT & PLAN NOTE
"H/o small bowel obstruction    Patient with abdominal pain with previous h/o SBO x 2 in the past and CT with noted "dilatation of fluid-filled small bowel loops throughout the abdomen, with a questioned transition point to decompressed distal small bowel in the right lower quadrant." Symptoms ongoing for 1 month but acutely worse the past 5 days with N/V and recent negative colonoscopy 2 months ago but upon review of Care Everywhere, the procedure was on 10/10/23 and with removal of 1 hyperplastic polyp.    Plan -   - Conservative treatment with bowel rest, pain control and supportive care, continue IV pain control and IV fluids  -mild symptoms improving, now with some flatus but no BM yet.  -elevation in transaminases, bili normal - improving, monitor   -f/u with surgery - conservative mngx, Gastrogaffin imaging study 2/14   "

## 2024-02-15 VITALS
WEIGHT: 162.25 LBS | RESPIRATION RATE: 16 BRPM | HEIGHT: 66 IN | SYSTOLIC BLOOD PRESSURE: 186 MMHG | DIASTOLIC BLOOD PRESSURE: 87 MMHG | HEART RATE: 68 BPM | TEMPERATURE: 98 F | OXYGEN SATURATION: 98 % | BODY MASS INDEX: 26.08 KG/M2

## 2024-02-15 PROBLEM — E87.29 STARVATION KETOACIDOSIS: Status: RESOLVED | Noted: 2024-02-13 | Resolved: 2024-02-15

## 2024-02-15 PROBLEM — T73.0XXA STARVATION KETOACIDOSIS: Status: RESOLVED | Noted: 2024-02-13 | Resolved: 2024-02-15

## 2024-02-15 PROBLEM — K56.609 SBO (SMALL BOWEL OBSTRUCTION): Status: RESOLVED | Noted: 2024-02-10 | Resolved: 2024-02-15

## 2024-02-15 LAB
ANION GAP SERPL CALC-SCNC: 6 MMOL/L (ref 8–16)
BUN SERPL-MCNC: 5 MG/DL (ref 8–23)
CALCIUM SERPL-MCNC: 9 MG/DL (ref 8.7–10.5)
CHLORIDE SERPL-SCNC: 117 MMOL/L (ref 95–110)
CO2 SERPL-SCNC: 22 MMOL/L (ref 23–29)
CREAT SERPL-MCNC: 0.7 MG/DL (ref 0.5–1.4)
ERYTHROCYTE [DISTWIDTH] IN BLOOD BY AUTOMATED COUNT: 12.2 % (ref 11.5–14.5)
EST. GFR  (NO RACE VARIABLE): >60 ML/MIN/1.73 M^2
GLUCOSE SERPL-MCNC: 129 MG/DL (ref 70–110)
HCT VFR BLD AUTO: 41.1 % (ref 37–48.5)
HGB BLD-MCNC: 13.5 G/DL (ref 12–16)
MCH RBC QN AUTO: 29.5 PG (ref 27–31)
MCHC RBC AUTO-ENTMCNC: 32.8 G/DL (ref 32–36)
MCV RBC AUTO: 90 FL (ref 82–98)
PLATELET # BLD AUTO: 170 K/UL (ref 150–450)
PMV BLD AUTO: 11 FL (ref 9.2–12.9)
POTASSIUM SERPL-SCNC: 3.5 MMOL/L (ref 3.5–5.1)
RBC # BLD AUTO: 4.57 M/UL (ref 4–5.4)
SODIUM SERPL-SCNC: 145 MMOL/L (ref 136–145)
WBC # BLD AUTO: 5.09 K/UL (ref 3.9–12.7)

## 2024-02-15 PROCEDURE — 36415 COLL VENOUS BLD VENIPUNCTURE: CPT | Performed by: STUDENT IN AN ORGANIZED HEALTH CARE EDUCATION/TRAINING PROGRAM

## 2024-02-15 PROCEDURE — 85027 COMPLETE CBC AUTOMATED: CPT | Performed by: STUDENT IN AN ORGANIZED HEALTH CARE EDUCATION/TRAINING PROGRAM

## 2024-02-15 PROCEDURE — 63600175 PHARM REV CODE 636 W HCPCS: Performed by: STUDENT IN AN ORGANIZED HEALTH CARE EDUCATION/TRAINING PROGRAM

## 2024-02-15 PROCEDURE — 25000003 PHARM REV CODE 250: Performed by: STUDENT IN AN ORGANIZED HEALTH CARE EDUCATION/TRAINING PROGRAM

## 2024-02-15 PROCEDURE — 80048 BASIC METABOLIC PNL TOTAL CA: CPT | Performed by: STUDENT IN AN ORGANIZED HEALTH CARE EDUCATION/TRAINING PROGRAM

## 2024-02-15 RX ORDER — AMOXICILLIN AND CLAVULANATE POTASSIUM 875; 125 MG/1; MG/1
1 TABLET, FILM COATED ORAL 2 TIMES DAILY
Qty: 2 TABLET | Refills: 0 | Status: SHIPPED | OUTPATIENT
Start: 2024-02-15 | End: 2024-02-16

## 2024-02-15 RX ORDER — LOSARTAN POTASSIUM 50 MG/1
100 TABLET ORAL DAILY
Start: 2024-02-15

## 2024-02-15 RX ADMIN — CEFTRIAXONE SODIUM 1 G: 1 INJECTION, POWDER, FOR SOLUTION INTRAMUSCULAR; INTRAVENOUS at 08:02

## 2024-02-15 RX ADMIN — LOSARTAN POTASSIUM 100 MG: 50 TABLET, FILM COATED ORAL at 08:02

## 2024-02-15 RX ADMIN — SODIUM CHLORIDE, SODIUM LACTATE, POTASSIUM CHLORIDE, CALCIUM CHLORIDE AND DEXTROSE MONOHYDRATE: 5; 600; 310; 30; 20 INJECTION, SOLUTION INTRAVENOUS at 06:02

## 2024-02-15 NOTE — PLAN OF CARE
Medicare Message     Important Message from Medicare regarding Discharge Appeal Rights Given to patient/caregiver; Explained to patient/caregiver; Signed/date by patient/caregiver Given to patient/caregiver; Explained to patient/caregiver; Signed/date by patient/caregiver   Date IMM was signed 2/12/2024 2/15/2024   Time IMM was signed [id number]

## 2024-02-15 NOTE — DISCHARGE INSTRUCTIONS
Please follow up with your PCP  If your pressures remain high at home you may need to add another medication for blood pressure.   Please take 1 more day of antibiotics to complete treatment for UTI     Modified Fibre Diet Prevention of Bowel Obstruction - diet information provided as additional handout

## 2024-02-15 NOTE — PROGRESS NOTES
House day 5.   Diagnosis-small-bowel obstruction, status post open hysterectomy, status post laparoscopic cholecystectomy, status post open appendectomy.    Subjective   Some cramping with water soluble contrast-SBFT in progress  Small bowel movement x3  Ambulating in isaacs    PE   Afebrile   Vital signs stable   HEENT-anicteric, atraumatic, EOM-I  Neck-trachea midline   Chest-clear  Heart-regular rate and rhythm   Abdomen-soft, mild mid abdominal tenderness to deep palpation, positive bowel sounds, no rebound, no guarding  Extremities-no tenderness    Imaging   Gastrografin SBFT in progress    Impression/plan   No immediately compelling surgical findings   Await results SBFT

## 2024-02-15 NOTE — DISCHARGE SUMMARY
[facility] Surg ([address] Medicine  Discharge Summary      Patient Name: Marjorie Harkins  MRN: [id number]  MARIELENA: [id number]  Patient Class: IP- Inpatient  Admission Date: 2/10/2024  Hospital Length of Stay: 5 days  Discharge Date and Time:  02/15/2024 5:22 PM  Attending Physician: Caridad att. providers found   Discharging Provider: Rafa Hudson MD  Primary Care Provider: JAQUI Chatman Jr. (Inactive)    Primary Care Team: Networked reference to record PCT     HPI:   72 y/o female with HTN, Factor V deficiency, and h/o DVT s/p IVC filter and SBO x 2 who presented with abdominal pain with N/V x 5 days.  Patient reported symptoms started proximally 1 month ago when she was having constipation only relieved with straining and Dulcolax suppository which is not her normal.  Then this past Tuesday, she had projectile vomiting for approximately 4 hours of undigested food and bilious material, with subsequent abdominal pain and cramping that has been intermittent and ongoing since Tuesday.  She had diarrhea stools yesterday, and her last food intake was some bread and with butter around 9:00 p.m.  Abdominal pain described as constant with intermittent worsening, diffuse, but mainly located in the lower quadrants; she could not identify any aggravating or relieving factors, but stated it felt like the pain she had with her previous 2 bouts with bowel obstruction.  Denies hematemesis or bloody stools.  Denies fevers or chills.  Multiple abdominal surgeries including cholecystectomy, appendectomy, and hysterectomy; and was told that she would likely have problems with obstruction again.  Her 1st bowel obstruction was in 1995, and subsequent episode was approximately 10 years afterwards.  She also reports she had a recent colonoscopy, approximately 2 months ago, which was reportedly unremarkable.  In the ER, WBC count was 4.05 with normal H&H and platelets.  CMP remarkable for albumin 3.4, calcium 8.3 (corrected  "is 8.78); UA with 1+ ketones, positive nitrites, trace leukocyte esterase, 7 WBC and 1 RBC.  Imaging with CT scan of the abdomen pelvis "dilatation of fluid-filled small bowel loops throughout the abdomen, with a questioned transition point to decompressed distal small bowel in the right lower quadrant."        Hospital Course:   Admitted for small bowel obstruction with conservative management of bowel rest, pain control and antiemetics. General Surgery consulted to follow. She is being treated with Rocephin for a UTI. Pt has still not had BM but passing gas. Reports pain has improved compared to admission. BG and bicarb low 2/13 with elevated ketones most likely starvation ketoacidosis. Will obtain VBG and add dextrose to fluids as pt still NPO.  Pt acidosis improved with dextrose and fluids. Still no BM so discussed with surgery and ordered Gastrogaffin imaging study. Imaging study showing no obstruction and patient had bowel movement and was started on diet, with no difficulties. Discussed with surgery and stable for discharge home. Needs follow up with PCP. Pt had some elevated BP's while inpatient but will monitor it and discuss with PCP should new meds need to be added.      Goals of Care Treatment Preferences:  Code Status: Full Code      Consults:   Consults (From admission, onward)          Status Ordering Provider     Inpatient consult to General Surgery  Once        Provider:  Garry Marquez Jr., MD    Acknowledged ALFONSO COLLIER     Inpatient consult to General surgery  Once        Provider:  Garry Marquez Jr., MD    Acknowledged ALFONSO COLLIER            No new Assessment & Plan notes have been filed under this hospital service since the last note was generated.  Service: Hospital Medicine    Final Active Diagnoses:    Diagnosis Date Noted POA    Acute cystitis without hematuria [N30.00] 02/12/2024 Yes    Essential hypertension [I10] 02/10/2024 Yes    History of DVT (deep vein thrombosis) [Z86.718] " 12/22/2020 Not Applicable      Problems Resolved During this Admission:    Diagnosis Date Noted Date Resolved POA    PRINCIPAL PROBLEM:  SBO (small bowel obstruction) [K56.609] 02/10/2024 02/15/2024 Yes    Starvation ketoacidosis [T73.0XXA, E87.29] 02/13/2024 02/15/2024 Unknown    Factor V Leiden mutation [D68.51] 12/22/2020 02/13/2024 Yes    H/O small bowel obstruction [Z87.19] 12/14/2015 02/13/2024 Not Applicable       Discharged Condition: good    Disposition: Home or Self Care    Follow Up:   Follow-up Information       JAQUI Chatman Jr. Follow up on 2/19/2024.    Why: 3:15pm  Contact information:  [address] [facility] [address]                           Patient Instructions:   Please follow up with your PCP  If your pressures remain high at home you may need to add another medication for blood pressure.   Please take 1 more day of antibiotics to complete treatment for UTI     Modified Fibre Diet Prevention of Bowel Obstruction - diet information provided as additional handout    Medications:  Reconciled Home Medications:      Medication List        START taking these medications      amoxicillin-clavulanate 875-125mg 875-125 mg per tablet  Commonly known as: AUGMENTIN  Take 1 tablet by mouth 2 (two) times daily. for 1 day            CHANGE how you take these medications      losartan 50 MG tablet  Commonly known as: COZAAR  Take 2 tablets (100 mg total) by mouth once daily.  What changed:   how much to take  when to take this     Pt reports she was already started on cozaar 100mg outpatient        CONTINUE taking these medications      acyclovir 5% 5 % ointment  Commonly known as: ZOVIRAX  Apply thin layer to affected area     clonazePAM 0.5 MG tablet  Commonly known as: KlonoPIN  Take 0.5 mg by mouth nightly as needed.            ASK your doctor about these medications      fluticasone propionate 50 mcg/actuation nasal spray  Commonly known as: FLONASE  2 sprays (100 mcg total) by Each Nostril  route daily as needed (nasal congestion).                  Time spent on the discharge of patient: 35 minutes         Rafa Hudson MD  Department of Hospital Medicine  [facility] - Med Surg ([address])

## 2024-02-15 NOTE — CHAPLAIN
02/14/24 1415   Clinical Encounter Type   Visit Type Initial Visit   Visit Category General Rounding   Visited With Patient;Health care provider  (No relatives/friends were present during the Spiritual Care  visit.)   Length of Visit 15 Minutes   Continue Visiting Yes   [facility] Encounters   Spiritual Resources Requested Prayer   Sacramental Encounters   Sacrament Other Provided Ashes  (Patient request the Callaway of Ashes for Olegario Wednesday.)   Patient Spiritual Encounters   Care Provided Compassionate presence;Life review/ reflection;Reflective listening;Explored pt/family concerns  (Spiritual Care Provided Ashes for Olegario Wednesday.)   Patient Coping Accepting;Open/discussion;Active rere;Internal strength  (Patient received the Callaway of the Ashes.)   Comments - Patient Patient expressed her gratitude for the receiving of Ashes from the Spiritual Care  visit.

## 2024-02-15 NOTE — PLAN OF CARE
Patient will discharge home. Appointments have been scheduled and added to AVS. Patient sister will transport patient home. All CM needs have been met.    02/15/24 1418   Final Note   Assessment Type Final Discharge Note   Anticipated Discharge Disposition Home   Hospital Resources/Appts/Education Provided Provided patient/caregiver with written discharge plan information;Appointments scheduled and added to AVS   Post-Acute Status   Discharge Delays None known at this time     [facility] ([address])  Discharge Final Note    Primary Care Provider: JAQUI Chatman Jr. (Inactive)    Expected Discharge Date: 2/15/2024    Final Discharge Note (most recent)       Final Note - 02/15/24 1418          Final Note    Assessment Type Final Discharge Note (P)      Anticipated Discharge Disposition Home or Self Care (P)      Hospital Resources/Appts/Education Provided Provided patient/caregiver with written discharge plan information;Appointments scheduled and added to AVS (P)         Post-Acute Status    Discharge Delays None known at this time (P)                      Important Message from Medicare  Important Message from Medicare regarding Discharge Appeal Rights: Given to patient/caregiver, Explained to patient/caregiver, Signed/date by patient/caregiver     Date IMM was signed: 02/12/24  Time IMM was signed: 0909    Contact Info       JAQUI Chatman Jr.   Relationship: PCP - General    [facility]  [address] [facility] [id number]       Next Steps: Follow up on 2/19/2024    Instructions: 3:15pm

## 2024-02-19 LAB
ALLENS TEST: ABNORMAL
DELSYS: ABNORMAL
HCO3 UR-SCNC: 17.7 MMOL/L (ref 24–28)
PCO2 BLDA: 34.4 MMHG (ref 35–45)
PH SMN: 7.32 [PH] (ref 7.35–7.45)
PO2 BLDA: 33 MMHG (ref 40–60)
POC BE: -8 MMOL/L
POC SATURATED O2: 59 % (ref 95–100)
POC TCO2: 19 MMOL/L (ref 24–29)
SAMPLE: ABNORMAL
SITE: ABNORMAL

## 2025-02-11 ENCOUNTER — OFFICE VISIT (OUTPATIENT)
Dept: URGENT CARE | Facility: CLINIC | Age: 75
End: 2025-02-11
Payer: MEDICARE

## 2025-02-11 VITALS
OXYGEN SATURATION: 97 % | HEART RATE: 67 BPM | RESPIRATION RATE: 18 BRPM | BODY MASS INDEX: 26.03 KG/M2 | TEMPERATURE: 98 F | DIASTOLIC BLOOD PRESSURE: 90 MMHG | HEIGHT: 66 IN | SYSTOLIC BLOOD PRESSURE: 169 MMHG | WEIGHT: 162 LBS

## 2025-02-11 DIAGNOSIS — R30.0 DYSURIA: ICD-10-CM

## 2025-02-11 DIAGNOSIS — R31.9 URINARY TRACT INFECTION WITH HEMATURIA, SITE UNSPECIFIED: Primary | ICD-10-CM

## 2025-02-11 DIAGNOSIS — N39.0 URINARY TRACT INFECTION WITH HEMATURIA, SITE UNSPECIFIED: Primary | ICD-10-CM

## 2025-02-11 LAB
BILIRUBIN, UA POC OHS: NEGATIVE
BLOOD, UA POC OHS: ABNORMAL
CLARITY, UA POC OHS: CLEAR
COLOR, UA POC OHS: YELLOW
GLUCOSE, UA POC OHS: NEGATIVE
KETONES, UA POC OHS: NEGATIVE
LEUKOCYTES, UA POC OHS: ABNORMAL
NITRITE, UA POC OHS: POSITIVE
PH, UA POC OHS: 6
PROTEIN, UA POC OHS: NEGATIVE
SPECIFIC GRAVITY, UA POC OHS: 1.01
UROBILINOGEN, UA POC OHS: 0.2

## 2025-02-11 PROCEDURE — 87086 URINE CULTURE/COLONY COUNT: CPT | Performed by: NURSE PRACTITIONER

## 2025-02-11 PROCEDURE — 87088 URINE BACTERIA CULTURE: CPT | Performed by: NURSE PRACTITIONER

## 2025-02-11 PROCEDURE — 87186 SC STD MICRODIL/AGAR DIL: CPT | Performed by: NURSE PRACTITIONER

## 2025-02-11 PROCEDURE — 99214 OFFICE O/P EST MOD 30 MIN: CPT | Mod: S$GLB,,, | Performed by: NURSE PRACTITIONER

## 2025-02-11 PROCEDURE — 81003 URINALYSIS AUTO W/O SCOPE: CPT | Mod: QW,S$GLB,, | Performed by: NURSE PRACTITIONER

## 2025-02-11 RX ORDER — NITROFURANTOIN 25; 75 MG/1; MG/1
100 CAPSULE ORAL 2 TIMES DAILY
COMMUNITY
Start: 2025-01-02 | End: 2025-02-11

## 2025-02-11 RX ORDER — CEPHALEXIN 500 MG/1
500 CAPSULE ORAL EVERY 12 HOURS
Qty: 14 CAPSULE | Refills: 0 | Status: SHIPPED | OUTPATIENT
Start: 2025-02-11 | End: 2025-02-18

## 2025-02-11 RX ORDER — TRETINOIN 0.5 MG/G
CREAM TOPICAL
COMMUNITY
Start: 2024-11-15 | End: 2025-11-15

## 2025-02-11 RX ORDER — LOSARTAN POTASSIUM 100 MG/1
100 TABLET ORAL
COMMUNITY

## 2025-02-11 RX ORDER — TRETINOIN 0.25 MG/G
CREAM TOPICAL
COMMUNITY
Start: 2024-09-13

## 2025-02-11 NOTE — PROGRESS NOTES
"Subjective:      Patient ID: Marjorie Harkins is a 74 y.o. female.    Vitals:  height is 5' 6" (1.676 m) and weight is 73.5 kg (162 lb). Her oral temperature is 98 °F (36.7 °C). Her blood pressure is 169/90 (abnormal) and her pulse is 67. Her respiration is 18 and oxygen saturation is 97%.     Chief Complaint: Dysuria    Pt is here with c/o lower pelvic pain, urgency in urination, and pain during urination.  Provider note begins below    Patient states she has been having more frequent UTIs.  Last 1 in December.  She was treated with 5 days of Macrobid When she was in [place].  She has noticed more vaginal dryness.  She swims 3 times a week.  She notices that she has more UTIs when she travels.  No flank pain or fevers.  She has been taking azo.    Dysuria   This is a new problem. The current episode started in the past 7 days (3 days). The problem occurs every urination. The problem has been unchanged. The quality of the pain is described as aching. She is Not sexually active. Associated symptoms include frequency and urgency. Pertinent negatives include no discharge, flank pain, nausea, vomiting or constipation. Treatments tried: azo.       Constitution: Negative for sweating, fatigue and fever.   Cardiovascular:  Negative for chest pain and sob on exertion.   Gastrointestinal:  Negative for nausea, vomiting, constipation and diarrhea.   Genitourinary:  Positive for dysuria, frequency and urgency. Negative for flank pain.      Objective:     Physical Exam   Constitutional: She is oriented to person, place, and time.   HENT:   Head: Normocephalic and atraumatic.   Cardiovascular: Normal rate.   Pulmonary/Chest: Effort normal. No respiratory distress.   Abdominal: Normal appearance. Soft. There is abdominal tenderness in the suprapubic area.   Neurological: She is alert and oriented to person, place, and time.   Skin: Skin is warm and dry.   Psychiatric: Her behavior is normal. Mood normal.       Results for orders " placed or performed in visit on 02/11/25   POCT Urinalysis(Instrument)    Collection Time: 02/11/25  2:51 PM   Result Value Ref Range    Color, POC UA Yellow Yellow, Straw, Colorless    Clarity, POC UA Clear Clear    Glucose, POC UA Negative Negative    Bilirubin, POC UA Negative Negative    Ketones, POC UA Negative Negative    Spec Grav POC UA 1.010 1.005 - 1.030    Blood, POC UA Trace-intact (A) Negative    pH, POC UA 6.0 5.0 - 8.0    Protein, POC UA Negative Negative    Urobilinogen, POC UA 0.2 <=1.0    Nitrite, POC UA Positive (A) Negative    WBC, POC UA Large (A) Negative      Assessment:     1. Urinary tract infection with hematuria, site unspecified    2. Dysuria        Plan:   UA with Blood, nitrates, and white blood cells   High-risk-urine:  Increase liquids.  Wipe front to back.  Avoid tub baths.    Discussed taking Estrace routinely with gyn             Urinary tract infection with hematuria, site unspecified  -     cephALEXin (KEFLEX) 500 MG capsule; Take 1 capsule (500 mg total) by mouth every 12 (twelve) hours. for 7 days  Dispense: 14 capsule; Refill: 0    Dysuria  -     POCT Urinalysis(Instrument)  -     Urine Culture High Risk

## 2025-02-14 ENCOUNTER — PATIENT MESSAGE (OUTPATIENT)
Dept: URGENT CARE | Facility: CLINIC | Age: 75
End: 2025-02-14
Payer: MEDICARE

## 2025-02-14 LAB — BACTERIA UR CULT: ABNORMAL

## 2025-05-25 ENCOUNTER — OFFICE VISIT (OUTPATIENT)
Dept: URGENT CARE | Facility: CLINIC | Age: 75
End: 2025-05-25
Payer: MEDICARE

## 2025-05-25 VITALS
HEART RATE: 60 BPM | DIASTOLIC BLOOD PRESSURE: 94 MMHG | OXYGEN SATURATION: 96 % | BODY MASS INDEX: 25.07 KG/M2 | HEIGHT: 66 IN | SYSTOLIC BLOOD PRESSURE: 156 MMHG | TEMPERATURE: 98 F | WEIGHT: 156 LBS | RESPIRATION RATE: 18 BRPM

## 2025-05-25 DIAGNOSIS — N30.00 ACUTE CYSTITIS WITHOUT HEMATURIA: Primary | ICD-10-CM

## 2025-05-25 DIAGNOSIS — R30.0 DYSURIA: ICD-10-CM

## 2025-05-25 DIAGNOSIS — R35.0 URINARY FREQUENCY: ICD-10-CM

## 2025-05-25 DIAGNOSIS — I10 ELEVATED BLOOD PRESSURE READING IN OFFICE WITH DIAGNOSIS OF HYPERTENSION: ICD-10-CM

## 2025-05-25 PROBLEM — Z85.828 HISTORY OF BASAL CELL CARCINOMA (BCC) OF SKIN: Status: ACTIVE | Noted: 2024-05-03

## 2025-05-25 LAB
BILIRUBIN, UA POC OHS: ABNORMAL
BLOOD, UA POC OHS: NEGATIVE
CLARITY, UA POC OHS: CLEAR
COLOR, UA POC OHS: ABNORMAL
GLUCOSE, UA POC OHS: 100
KETONES, UA POC OHS: ABNORMAL
LEUKOCYTES, UA POC OHS: ABNORMAL
NITRITE, UA POC OHS: POSITIVE
PH, UA POC OHS: 5.5
PROTEIN, UA POC OHS: 100
SPECIFIC GRAVITY, UA POC OHS: 1.01
UROBILINOGEN, UA POC OHS: 2

## 2025-05-25 PROCEDURE — 81003 URINALYSIS AUTO W/O SCOPE: CPT | Mod: QW,S$GLB,, | Performed by: NURSE PRACTITIONER

## 2025-05-25 PROCEDURE — 87088 URINE BACTERIA CULTURE: CPT | Performed by: NURSE PRACTITIONER

## 2025-05-25 PROCEDURE — 99214 OFFICE O/P EST MOD 30 MIN: CPT | Mod: S$GLB,,, | Performed by: NURSE PRACTITIONER

## 2025-05-25 PROCEDURE — 1157F ADVNC CARE PLAN IN RCRD: CPT | Mod: CPTII,S$GLB,, | Performed by: NURSE PRACTITIONER

## 2025-05-25 RX ORDER — NITROFURANTOIN 25; 75 MG/1; MG/1
100 CAPSULE ORAL 2 TIMES DAILY
Qty: 10 CAPSULE | Refills: 0 | Status: SHIPPED | OUTPATIENT
Start: 2025-05-25 | End: 2025-05-30

## 2025-05-25 RX ORDER — NIFEDIPINE 30 MG/1
30 TABLET, EXTENDED RELEASE ORAL EVERY MORNING
COMMUNITY
Start: 2025-04-17 | End: 2026-04-17

## 2025-05-25 NOTE — PATIENT INSTRUCTIONS
Patient Instructions   PLEASE READ YOUR DISCHARGE INSTRUCTIONS ENTIRELY AS IT CONTAINS IMPORTANT INFORMATION.  Monitor your blood pressure record findings present to provider  - A urine culture was sent. You will be contacted once it results and appropriate action will be taken if needed.  We will notify patient of the results in the next 3-5 days; can receive results on [facility].   - Take the pyridium three times a day with meals for 3 days. It will turn your urine orange. You do not need to take the whole prescription you can stop this once the pain is better and finish out the antibiotics  - Drink plenty of fluids, wipe front to back, take showers not baths, no scented soaps, wear breathable cotton underwear, urinate after sexual intercourse.   - Tylenol or Ibuprofen as directed as needed for pain.    - If you were prescribed antibiotics, please take them to completion. Please supplement with OTC probiotics and yogurt.   -take Zofran underneath your tongue as needed for nausea associated with motion sickness.  -You must understand that you've received an Urgent Care treatment only and that you may be released before all your medical problems are known or treated. You, the patient, will arrange for follow up care as instructed. Please arrange follow up with your primary medical clinic within 2-5 days if your signs and symptoms have not resolved or worsen. Please go to the ER for worsening symptoms including worsening fever, flank pain, vomiting, unable to tolerate fluids, fatigue, etc.   - Follow up with your PCP or specialty clinic as directed in next 2-5 days for re-evaluation.  You can call [phone] to schedule an appointment with the appropriate provider.    - If your condition worsens or fails to improve we recommend that you receive another evaluation at the emergency room immediately or contact your primary medical clinic to discuss your concerns.      RED FLAGS/WARNING SYMPTOMS DISCUSSED WITH PATIENT  THAT WOULD WARRANT EMERGENT MEDICAL ATTENTION. Patient aware and verbalized understanding.

## 2025-05-25 NOTE — PROGRESS NOTES
"Subjective:      Patient ID: Marjorie Harkins is a 74 y.o. female.    Vitals:  height is 5' 6" (1.676 m) and weight is 70.8 kg (156 lb). Her tympanic temperature is 97.6 °F (36.4 °C). Her blood pressure is 156/94 (abnormal) and her pulse is 60. Her respiration is 18 and oxygen saturation is 96%.     Chief Complaint: Urinary Frequency    Patient presents with c.o urinary frequency x 3 days. Patient also with dysuria. Patient states she is also having bladder spasms. She has been taking otc extra strength azo.     74 year old female presents to clinic with complaints of urinary frequency, lower abdominal pressure and burning with urination x 3 days treating with AZO without resolve.    Urinary Frequency   This is a new problem. The current episode started acute onset. The problem occurs intermittently. The problem has been unchanged. The quality of the pain is described as aching. Associated symptoms include frequency and urgency. Pertinent negatives include no chills, flank pain, hematuria, hesitancy, nausea or vomiting. Treatments tried: azo. The treatment provided moderate relief.       Constitution: Negative for chills, sweating, fatigue and fever.   Gastrointestinal:  Positive for abdominal pain. Negative for nausea and vomiting.   Genitourinary:  Positive for dysuria, frequency and urgency. Negative for flank pain and hematuria.      Objective:     Physical Exam   Constitutional: She is oriented to person, place, and time. She appears well-developed.   HENT:   Head: Normocephalic and atraumatic.   Ears:   Right Ear: External ear normal.   Left Ear: External ear normal.   Nose: Nose normal. No nasal deformity. No epistaxis.   Mouth/Throat: Oropharynx is clear and moist and mucous membranes are normal.   Eyes: Lids are normal. Extraocular movement intact   Neck: Trachea normal and phonation normal. Neck supple.   Cardiovascular: Normal rate.   Pulmonary/Chest: Effort normal.   Abdominal: Normal appearance and bowel " sounds are normal. She exhibits no distension. Soft. There is abdominal tenderness in the suprapubic area. There is no left CVA tenderness and no right CVA tenderness.   Neurological: She is alert and oriented to person, place, and time.   Skin: Skin is warm, dry and intact.   Psychiatric: Her speech is normal and behavior is normal.   Nursing note and vitals reviewed.      Assessment:     1. Acute cystitis without hematuria    2. Urinary frequency    3. Dysuria    4. Elevated blood pressure reading in office with diagnosis of hypertension      Results for orders placed or performed in visit on 05/25/25   POCT Urinalysis(Instrument)    Collection Time: 05/25/25  1:02 PM   Result Value Ref Range    Color, POC UA Red (A) Yellow, Straw, Colorless    Clarity, POC UA Clear Clear    Glucose, POC  (A) Negative    Bilirubin, POC UA Small (A) Negative    Ketones, POC UA Trace (A) Negative    Spec Grav POC UA 1.010 1.005 - 1.030    Blood, POC UA Negative Negative    pH, POC UA 5.5 5.0 - 8.0    Protein, POC  (A) Negative    Urobilinogen, POC UA 2.0 (A) <=1.0    Nitrite, POC UA Positive (A) Negative    WBC, POC UA Large (A) Negative      Plan:       Acute cystitis without hematuria  -     Urine culture  -     nitrofurantoin, macrocrystal-monohydrate, (MACROBID) 100 MG capsule; Take 1 capsule (100 mg total) by mouth 2 (two) times daily. for 5 days  Dispense: 10 capsule; Refill: 0    Urinary frequency  -     POCT Urinalysis(Instrument)    Dysuria    Elevated blood pressure reading in office with diagnosis of hypertension    Patient reports stop taking her procardia as it was making her feel fatigue. Directed to notify pcp to discuss medication options and treatment plan. Monitor blood pressure and present log to clinic and send message to pcp for discussion      Patient Instructions   PLEASE READ YOUR DISCHARGE INSTRUCTIONS ENTIRELY AS IT CONTAINS IMPORTANT INFORMATION.  Monitor your blood pressure record findings  present to provider  - A urine culture was sent. You will be contacted once it results and appropriate action will be taken if needed.  We will notify patient of the results in the next 3-5 days; [facility]   - Take the pyridium three times a day with meals for 3 days. It will turn your urine orange. You do not need to take the whole prescription you can stop this once the pain is better and finish out the antibiotics  - Drink plenty of fluids, wipe front to back, take showers not baths, no scented soaps, wear breathable cotton underwear, urinate after sexual intercourse.   - Tylenol or Ibuprofen as directed as needed for pain.    - If you were prescribed antibiotics, please take them to completion. Please supplement with OTC probiotics and yogurt.   -take Zofran underneath your tongue as needed for nausea associated with motion sickness.  -You must understand that you've received an Urgent Care treatment only and that you may be released before all your medical problems are known or treated. You, the patient, will arrange for follow up care as instructed. Please arrange follow up with your primary medical clinic within 2-5 days if your signs and symptoms have not resolved or worsen. Please go to the ER for worsening symptoms including worsening fever, flank pain, vomiting, unable to tolerate fluids, fatigue, etc.   - Follow up with your PCP or specialty clinic as directed in next 2-5 days for re-evaluation.  You can call [phone] to schedule an appointment with the appropriate provider.    - If your condition worsens or fails to improve we recommend that you receive another evaluation at the emergency room immediately or contact your primary medical clinic to discuss your concerns.      RED FLAGS/WARNING SYMPTOMS DISCUSSED WITH PATIENT THAT WOULD WARRANT EMERGENT MEDICAL ATTENTION. Patient aware and verbalized understanding.

## 2025-05-26 ENCOUNTER — RESULTS FOLLOW-UP (OUTPATIENT)
Dept: URGENT CARE | Facility: CLINIC | Age: 75
End: 2025-05-26

## 2025-05-28 ENCOUNTER — TELEPHONE (OUTPATIENT)
Dept: URGENT CARE | Facility: CLINIC | Age: 75
End: 2025-05-28
Payer: MEDICARE

## 2025-05-28 LAB — BACTERIA UR CULT: ABNORMAL

## 2025-05-28 NOTE — TELEPHONE ENCOUNTER
Urine cx shows e coli, sensitive to prescribed nitrofurantoin.  Pt viewed results online.  NP called pt and left VM with callback # for questions or concerns.

## 2025-08-21 ENCOUNTER — OFFICE VISIT (OUTPATIENT)
Dept: URGENT CARE | Facility: CLINIC | Age: 75
End: 2025-08-21
Payer: MEDICARE

## 2025-08-21 VITALS
WEIGHT: 156 LBS | HEART RATE: 61 BPM | RESPIRATION RATE: 18 BRPM | BODY MASS INDEX: 25.07 KG/M2 | SYSTOLIC BLOOD PRESSURE: 175 MMHG | HEIGHT: 66 IN | OXYGEN SATURATION: 98 % | TEMPERATURE: 98 F | DIASTOLIC BLOOD PRESSURE: 85 MMHG

## 2025-08-21 DIAGNOSIS — H61.23 BILATERAL IMPACTED CERUMEN: Primary | ICD-10-CM

## 2025-08-21 DIAGNOSIS — H93.8X3 EAR PRESSURE, BILATERAL: ICD-10-CM

## 2025-08-21 DIAGNOSIS — R09.81 NASAL CONGESTION: ICD-10-CM

## 2025-08-21 RX ORDER — FLUTICASONE PROPIONATE 50 MCG
2 SPRAY, SUSPENSION (ML) NASAL DAILY PRN
Qty: 15.8 ML | Refills: 0 | Status: SHIPPED | OUTPATIENT
Start: 2025-08-21

## (undated) DEVICE — KIT WING PAD POSITIONING

## (undated) DEVICE — APPLIER CLIP EPIX UNIV 5X34

## (undated) DEVICE — EVACUATOR WOUND BULB 100CC

## (undated) DEVICE — NDL HYPO REG 25G X 1 1/2

## (undated) DEVICE — SUT VICRYL 0 27 CT-2

## (undated) DEVICE — NDL INSUF ULTRA VERESS 120MM

## (undated) DEVICE — PENCIL ELECTROSURG HOLST W/BLD

## (undated) DEVICE — IRRIGATOR ENDOSCOPY DISP.

## (undated) DEVICE — SYR B-D DISP CONTROL 10CC100/C

## (undated) DEVICE — TROCAR KII FIOS 5MM X 100MM

## (undated) DEVICE — SOL 9P NACL IRR PIC IL

## (undated) DEVICE — TROCAR KII FIOS 11MM X 100MM

## (undated) DEVICE — DRAIN WND 15FRX3/16X4.7MM TRCR

## (undated) DEVICE — STRIP STERI REIN CLSR 1/2X2IN

## (undated) DEVICE — SUT MCRYL PLUS 4-0 PS2 27IN

## (undated) DEVICE — ELECTRODE REM PLYHSV RETURN 9

## (undated) DEVICE — SOL NS 1000CC

## (undated) DEVICE — SEE MEDLINE ITEM 156925

## (undated) DEVICE — ADHESIVE DERMABOND ADVANCED

## (undated) DEVICE — SOL CLEARIFY VISUALIZATION LAP